# Patient Record
Sex: FEMALE | Race: WHITE | NOT HISPANIC OR LATINO | Employment: FULL TIME | URBAN - NONMETROPOLITAN AREA
[De-identification: names, ages, dates, MRNs, and addresses within clinical notes are randomized per-mention and may not be internally consistent; named-entity substitution may affect disease eponyms.]

---

## 2019-03-19 ENCOUNTER — HOSPITAL ENCOUNTER (EMERGENCY)
Facility: HOSPITAL | Age: 44
Discharge: HOME | End: 2019-03-20
Attending: EMERGENCY MEDICINE | Admitting: EMERGENCY MEDICINE

## 2019-03-19 VITALS
OXYGEN SATURATION: 100 % | RESPIRATION RATE: 16 BRPM | WEIGHT: 145 LBS | HEIGHT: 66 IN | HEART RATE: 81 BPM | DIASTOLIC BLOOD PRESSURE: 88 MMHG | TEMPERATURE: 97.6 F | SYSTOLIC BLOOD PRESSURE: 132 MMHG | BODY MASS INDEX: 23.3 KG/M2

## 2019-03-19 DIAGNOSIS — R10.9 ABDOMINAL PAIN, UNSPECIFIED ABDOMINAL LOCATION: Primary | ICD-10-CM

## 2019-03-19 LAB
6-ACETYL MORPHINE: NEGATIVE
ALBUMIN SERPL-MCNC: 4.64 G/DL (ref 3.5–5.2)
ALBUMIN/GLOB SERPL: 1.1 G/DL
ALP SERPL-CCNC: 49 U/L (ref 39–117)
ALT SERPL W P-5'-P-CCNC: 8 U/L (ref 1–33)
AMPHET+METHAMPHET UR QL: POSITIVE
ANION GAP SERPL CALCULATED.3IONS-SCNC: 10.1 MMOL/L
AST SERPL-CCNC: 24 U/L (ref 1–32)
BACTERIA UR QL AUTO: ABNORMAL /HPF
BARBITURATES UR QL SCN: NEGATIVE
BASOPHILS # BLD AUTO: 0.02 10*3/MM3 (ref 0–0.2)
BASOPHILS NFR BLD AUTO: 0.4 % (ref 0–1.5)
BENZODIAZ UR QL SCN: NEGATIVE
BILIRUB SERPL-MCNC: 0.5 MG/DL (ref 0.2–1.2)
BILIRUB UR QL STRIP: NEGATIVE
BUN BLD-MCNC: 12 MG/DL (ref 6–20)
BUN/CREAT SERPL: 22.6 (ref 7–25)
BUPRENORPHINE SERPL-MCNC: POSITIVE NG/ML
CALCIUM SPEC-SCNC: 9.5 MG/DL (ref 8.6–10.5)
CANNABINOIDS SERPL QL: NEGATIVE
CHLORIDE SERPL-SCNC: 100 MMOL/L (ref 98–107)
CLARITY UR: ABNORMAL
CO2 SERPL-SCNC: 25.9 MMOL/L (ref 22–29)
COCAINE UR QL: NEGATIVE
COLOR UR: YELLOW
CREAT BLD-MCNC: 0.53 MG/DL (ref 0.57–1)
DEPRECATED RDW RBC AUTO: 45.9 FL (ref 37–54)
EOSINOPHIL # BLD AUTO: 0.29 10*3/MM3 (ref 0–0.4)
EOSINOPHIL NFR BLD AUTO: 6 % (ref 0.3–6.2)
ERYTHROCYTE [DISTWIDTH] IN BLOOD BY AUTOMATED COUNT: 14.3 % (ref 12.3–15.4)
GFR SERPL CREATININE-BSD FRML MDRD: 126 ML/MIN/1.73
GLOBULIN UR ELPH-MCNC: 4.1 GM/DL
GLUCOSE BLD-MCNC: 84 MG/DL (ref 65–99)
GLUCOSE UR STRIP-MCNC: NEGATIVE MG/DL
HCG SERPL QL: NEGATIVE
HCT VFR BLD AUTO: 41.6 % (ref 34–46.6)
HGB BLD-MCNC: 13.6 G/DL (ref 12–15.9)
HGB UR QL STRIP.AUTO: ABNORMAL
HOLD SPECIMEN: NORMAL
HOLD SPECIMEN: NORMAL
HYALINE CASTS UR QL AUTO: ABNORMAL /LPF
IMM GRANULOCYTES # BLD AUTO: 0 10*3/MM3 (ref 0–0.05)
IMM GRANULOCYTES NFR BLD AUTO: 0 % (ref 0–0.5)
KETONES UR QL STRIP: NEGATIVE
LEUKOCYTE ESTERASE UR QL STRIP.AUTO: NEGATIVE
LIPASE SERPL-CCNC: 44 U/L (ref 13–60)
LYMPHOCYTES # BLD AUTO: 1.66 10*3/MM3 (ref 0.7–3.1)
LYMPHOCYTES NFR BLD AUTO: 34.4 % (ref 19.6–45.3)
MCH RBC QN AUTO: 29.1 PG (ref 26.6–33)
MCHC RBC AUTO-ENTMCNC: 32.7 G/DL (ref 31.5–35.7)
MCV RBC AUTO: 89.1 FL (ref 79–97)
METHADONE UR QL SCN: NEGATIVE
MONOCYTES # BLD AUTO: 0.29 10*3/MM3 (ref 0.1–0.9)
MONOCYTES NFR BLD AUTO: 6 % (ref 5–12)
MUCOUS THREADS URNS QL MICRO: ABNORMAL /HPF
NEUTROPHILS # BLD AUTO: 2.57 10*3/MM3 (ref 1.4–7)
NEUTROPHILS NFR BLD AUTO: 53.2 % (ref 42.7–76)
NITRITE UR QL STRIP: POSITIVE
OPIATES UR QL: NEGATIVE
OXYCODONE UR QL SCN: NEGATIVE
PCP UR QL SCN: NEGATIVE
PH UR STRIP.AUTO: 5.5 [PH] (ref 5–8)
PLATELET # BLD AUTO: 201 10*3/MM3 (ref 140–450)
PMV BLD AUTO: 9.9 FL (ref 6–12)
POTASSIUM BLD-SCNC: 3.8 MMOL/L (ref 3.5–5.2)
PROT SERPL-MCNC: 8.7 G/DL (ref 6–8.5)
PROT UR QL STRIP: NEGATIVE
RBC # BLD AUTO: 4.67 10*6/MM3 (ref 3.77–5.28)
RBC # UR: ABNORMAL /HPF
REF LAB TEST METHOD: ABNORMAL
SODIUM BLD-SCNC: 136 MMOL/L (ref 136–145)
SP GR UR STRIP: 1.02 (ref 1–1.03)
SQUAMOUS #/AREA URNS HPF: ABNORMAL /HPF
UROBILINOGEN UR QL STRIP: ABNORMAL
WBC NRBC COR # BLD: 4.83 10*3/MM3 (ref 3.4–10.8)
WBC UR QL AUTO: ABNORMAL /HPF
WHOLE BLOOD HOLD SPECIMEN: NORMAL
WHOLE BLOOD HOLD SPECIMEN: NORMAL

## 2019-03-19 PROCEDURE — 85025 COMPLETE CBC W/AUTO DIFF WBC: CPT | Performed by: FAMILY MEDICINE

## 2019-03-19 PROCEDURE — 80307 DRUG TEST PRSMV CHEM ANLYZR: CPT | Performed by: EMERGENCY MEDICINE

## 2019-03-19 PROCEDURE — 80053 COMPREHEN METABOLIC PANEL: CPT | Performed by: FAMILY MEDICINE

## 2019-03-19 PROCEDURE — 84703 CHORIONIC GONADOTROPIN ASSAY: CPT | Performed by: FAMILY MEDICINE

## 2019-03-19 PROCEDURE — 99282 EMERGENCY DEPT VISIT SF MDM: CPT

## 2019-03-19 PROCEDURE — 81001 URINALYSIS AUTO W/SCOPE: CPT | Performed by: EMERGENCY MEDICINE

## 2019-03-19 PROCEDURE — 36415 COLL VENOUS BLD VENIPUNCTURE: CPT

## 2019-03-19 PROCEDURE — 83690 ASSAY OF LIPASE: CPT | Performed by: FAMILY MEDICINE

## 2019-03-19 RX ORDER — SODIUM CHLORIDE 0.9 % (FLUSH) 0.9 %
10 SYRINGE (ML) INJECTION AS NEEDED
Status: DISCONTINUED | OUTPATIENT
Start: 2019-03-19 | End: 2019-03-20 | Stop reason: HOSPADM

## 2019-03-20 ENCOUNTER — APPOINTMENT (OUTPATIENT)
Dept: CT IMAGING | Facility: HOSPITAL | Age: 44
End: 2019-03-20

## 2019-03-20 NOTE — ED NOTES
PT seem leaving the ED at this time, pt did not receive discharge instructions from provider, pt had agreed to stay for treatment in ER about 15 minutes earlier. Pt did not attempt to ask any further questions before leaving. Pt remained on ED stretcher with NADN, pt vitals signs remained stable. Pt did not receive any medication or an IV while in ED since she refused.        Heidy Barba, RN  03/20/19 0664

## 2019-03-20 NOTE — ED NOTES
I faxed a signed release of medical records to [facility].     Cm Marcial  03/19/19 4099

## 2019-03-20 NOTE — ED NOTES
"Pt refusing an IV right now unless \"there is medicine for it.\"     Heidy Barba, RN  03/19/19 7755    "

## 2019-03-20 NOTE — ED NOTES
"Pt in room and verbalizes frustration, \"i'm just tired and want to go home\". Pt advised to stay for the scan that has been ordered and the pt wants the provider to come to the room. Provider aware.     Heidy Barba, RN  03/20/19 0002    "

## 2019-03-20 NOTE — ED NOTES
Pt seen leaving ED with no verbal discharge instructions from provider. Provider made aware.      Heidy Barba, RN  03/20/19 0000

## 2019-03-20 NOTE — ED PROVIDER NOTES
Subjective   43-year-old female presents to the emergency room today complaining of generalized abdominal pain and generalized fatigue.  Patient stated that last week she was seen at [facility] emergency room and told that she had a mass on 1 of her kidneys.  Stated that she was supposed to have further imaging but does not have a family doctor to get that done.  Patient states that she just needs to find out what is going on with her.  She denies any fever, chest pain, shortness of breath, cough, congestion, wheezing, nausea vomiting or diarrhea.        History provided by:  Patient   used: No        Review of Systems   Constitutional: Positive for fatigue.   HENT: Negative.    Eyes: Negative.    Respiratory: Negative.    Cardiovascular: Negative.    Gastrointestinal: Positive for abdominal distention.   Endocrine: Negative.    Genitourinary: Negative.    Musculoskeletal: Negative.    Skin: Negative.    Allergic/Immunologic: Negative.    Neurological: Negative.    Hematological: Negative.    Psychiatric/Behavioral: Negative.    All other systems reviewed and are negative.      No past medical history on file.    Allergies   Allergen Reactions   • Contrast Dye Anaphylaxis       No past surgical history on file.    No family history on file.    Social History     Socioeconomic History   • Marital status:      Spouse name: Not on file   • Number of children: Not on file   • Years of education: Not on file   • Highest education level: Not on file           Objective   Physical Exam   Constitutional: She is oriented to person, place, and time. She appears well-developed and well-nourished.   HENT:   Head: Normocephalic and atraumatic.   Mouth/Throat: Oropharynx is clear and moist.   Eyes: EOM are normal. Pupils are equal, round, and reactive to light.   Cardiovascular: Normal rate, regular rhythm, normal heart sounds and intact distal pulses.   Pulmonary/Chest: Effort normal and breath  sounds normal.   Abdominal: Soft. Normal appearance and bowel sounds are normal. There is generalized tenderness.   Neurological: She is alert and oriented to person, place, and time.   Skin: Skin is warm and dry.   Psychiatric: She has a normal mood and affect. Her behavior is normal.   Nursing note and vitals reviewed.      Procedures           ED Course  ED Course as of Mar 20 0707   Tue Mar 19, 2019   2345 She will voiced to the nurse that she just wanted to leave and not have her scans.  I went spoke with the patient and educated her on what I had found based on her medical records from [facility].  Instructed her that we needed to get a CT scan of her abdomen and pelvis without contrast to evaluate this supposed mass.  She voiced understanding and was agreeable to the treatment plan.  [ML]   Wed Mar 20, 2019   0008 I reviewed medical records from [facility].  Patient was seen there on March 4, 2019.  Patient was seen for shortness of breath and O2 saturations dropping with ambulation.  Patient received a CT chest PE protocol impression was read as no PE.  Partially visualized enhancing nodule at the margin of the left kidney is nonspecific but somewhat concerning for renal cell carcinoma further evaluation with dedicated renal mass protocol CT or MRI is suggested.  Otherwise patient had a negative workup.  Patient tells me that she is highly allergic to contrast dye.  CT stone protocol was ordered.  [ML]   0706 Patient eloped from her room.  She was seen leaving the ER.  Patient never returned to her room.  [ML]      ED Course User Index  [ML] Linda Swain PA                  [facility]      Final diagnoses:   Abdominal pain, unspecified abdominal location            Linda Swain PA  03/20/19 0707

## 2019-03-20 NOTE — ED NOTES
Provider and myself to bedside to explain the benefits of staying for treatment, and pt agrees to stay at this time for the scan. Will continue to monitor and follow plan of care.      Heidy Barba, RN  03/20/19 0003

## 2019-04-09 ENCOUNTER — OFFICE VISIT (OUTPATIENT)
Dept: FAMILY MEDICINE CLINIC | Facility: CLINIC | Age: 44
End: 2019-04-09

## 2019-04-09 VITALS
HEIGHT: 66 IN | TEMPERATURE: 97.5 F | SYSTOLIC BLOOD PRESSURE: 120 MMHG | BODY MASS INDEX: 23.14 KG/M2 | HEART RATE: 71 BPM | WEIGHT: 144 LBS | OXYGEN SATURATION: 96 % | DIASTOLIC BLOOD PRESSURE: 76 MMHG

## 2019-04-09 DIAGNOSIS — R53.83 OTHER FATIGUE: ICD-10-CM

## 2019-04-09 DIAGNOSIS — F19.11 HX OF DRUG ABUSE (HCC): ICD-10-CM

## 2019-04-09 DIAGNOSIS — F41.9 ANXIETY AND DEPRESSION: ICD-10-CM

## 2019-04-09 DIAGNOSIS — N39.0 ACUTE UTI: ICD-10-CM

## 2019-04-09 DIAGNOSIS — M79.2 LEG NEURALGIA, RIGHT: ICD-10-CM

## 2019-04-09 DIAGNOSIS — B18.2 CHRONIC HEPATITIS C WITHOUT HEPATIC COMA (HCC): Primary | ICD-10-CM

## 2019-04-09 DIAGNOSIS — N28.89 RENAL MASS, LEFT: ICD-10-CM

## 2019-04-09 DIAGNOSIS — R55 SYNCOPE, UNSPECIFIED SYNCOPE TYPE: ICD-10-CM

## 2019-04-09 DIAGNOSIS — F32.A ANXIETY AND DEPRESSION: ICD-10-CM

## 2019-04-09 DIAGNOSIS — K21.9 GASTROESOPHAGEAL REFLUX DISEASE, ESOPHAGITIS PRESENCE NOT SPECIFIED: ICD-10-CM

## 2019-04-09 DIAGNOSIS — I34.1 MVP (MITRAL VALVE PROLAPSE): ICD-10-CM

## 2019-04-09 LAB
BILIRUB BLD-MCNC: NEGATIVE MG/DL
CLARITY, POC: CLEAR
COLOR UR: ABNORMAL
GLUCOSE UR STRIP-MCNC: NEGATIVE MG/DL
KETONES UR QL: NEGATIVE
LEUKOCYTE EST, POC: NEGATIVE
NITRITE UR-MCNC: NEGATIVE MG/ML
PH UR: 6 [PH] (ref 5–8)
PROT UR STRIP-MCNC: ABNORMAL MG/DL
RBC # UR STRIP: NEGATIVE /UL
SP GR UR: 1.03 (ref 1–1.03)
UROBILINOGEN UR QL: NORMAL

## 2019-04-09 PROCEDURE — 82607 VITAMIN B-12: CPT | Performed by: NURSE PRACTITIONER

## 2019-04-09 PROCEDURE — 99204 OFFICE O/P NEW MOD 45 MIN: CPT | Performed by: NURSE PRACTITIONER

## 2019-04-09 PROCEDURE — 84439 ASSAY OF FREE THYROXINE: CPT | Performed by: NURSE PRACTITIONER

## 2019-04-09 PROCEDURE — 85025 COMPLETE CBC W/AUTO DIFF WBC: CPT | Performed by: NURSE PRACTITIONER

## 2019-04-09 PROCEDURE — 84443 ASSAY THYROID STIM HORMONE: CPT | Performed by: NURSE PRACTITIONER

## 2019-04-09 PROCEDURE — 80074 ACUTE HEPATITIS PANEL: CPT | Performed by: NURSE PRACTITIONER

## 2019-04-09 PROCEDURE — 80053 COMPREHEN METABOLIC PANEL: CPT | Performed by: NURSE PRACTITIONER

## 2019-04-09 PROCEDURE — 83735 ASSAY OF MAGNESIUM: CPT | Performed by: NURSE PRACTITIONER

## 2019-04-09 RX ORDER — OMEPRAZOLE 40 MG/1
40 CAPSULE, DELAYED RELEASE ORAL DAILY
Qty: 30 CAPSULE | Refills: 2 | Status: SHIPPED | OUTPATIENT
Start: 2019-04-09 | End: 2020-03-19 | Stop reason: SDUPTHER

## 2019-04-09 RX ORDER — DIVALPROEX SODIUM 500 MG/1
TABLET, DELAYED RELEASE ORAL
Refills: 1 | COMMUNITY
Start: 2019-03-12 | End: 2019-12-12

## 2019-04-09 RX ORDER — PHENTERMINE HYDROCHLORIDE 37.5 MG/1
37.5 TABLET ORAL
COMMUNITY
End: 2019-06-18

## 2019-04-09 RX ORDER — BUPRENORPHINE HYDROCHLORIDE AND NALOXONE HYDROCHLORIDE DIHYDRATE 8; 2 MG/1; MG/1
TABLET SUBLINGUAL
Refills: 0 | COMMUNITY
Start: 2019-03-12 | End: 2019-12-12

## 2019-04-09 NOTE — PROGRESS NOTES
Subjective   Meghan Rivas is a 43 y.o. female.     Chief Complaint: Establish Care    Anxiety   Presents for initial visit. Onset was more than 5 years ago. The problem has been gradually worsening. Symptoms include decreased concentration, depressed mood, dizziness, insomnia, irritability, malaise, nervous/anxious behavior, panic and restlessness. Patient reports no suicidal ideas. 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 Symptoms occur constantly. The severity of symptoms is causing significant distress. The quality of sleep is fair. Nighttime awakenings: one to two.     Risk factors: hx of drug abuse. Her past medical history is significant for anxiety/panic attacks and depression. Past treatments include SSRIs. Compliance with prior treatments has been variable.   Depression   Visit Type: initial  Onset of symptoms: more than 5 years ago  Progression since onset: gradually worsening  Patient presents with the following symptoms: decreased concentration, depressed mood, dizziness, fatigue, insomnia, irritability, malaise, nervousness/anxiety, panic, restlessness and weight loss.  Patient is not experiencing: suicidal ideas and suicidal planning.  Frequency of symptoms: constantly   Severity: causing significant distress   Risk factors: illicit drug use  Patient has a history of: anxiety/panic attacks, depression and mental illness  Treatment tried: non-SSRI antidepressants and SSRI  Compliance with treatment: variable  Improvement on treatment: mild      Lower Extremity Issue   This is a chronic problem. The current episode started more than 1 year ago. The problem occurs daily. The problem has been unchanged. Associated symptoms include abdominal pain, coughing, fatigue and myalgias. Associated symptoms comments: Patient has several areas behind her right knee area that are scarred from IV drug use;  N/T in right leg and foot . The symptoms are  aggravated by walking. She has tried nothing for the symptoms.   Heartburn   She complains of abdominal pain, coughing and heartburn. 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 This is a new problem. The current episode started more than 1 month ago. The problem occurs frequently. The problem has been unchanged. The heartburn does not wake her from sleep. The heartburn does not limit her activity. The heartburn doesn't change with position. Nothing aggravates the symptoms. Associated symptoms include fatigue and weight loss. Risk factors include smoking/tobacco exposure. She has tried nothing for the symptoms.   Flank Pain   This is a recurrent problem. The current episode started more than 1 month ago. The problem occurs daily. The problem is unchanged. The quality of the pain is described as aching and cramping. The pain does not radiate. The pain is at a severity of 6/10. The pain is moderate. The pain is the same all the time. Associated symptoms include abdominal pain and weight loss. (Pt had CT done which showed renal nodule) She has tried nothing for the symptoms.   Dizziness   This is a new problem. The current episode started more than 1 month ago. Episode frequency: two episodes of syncope. The problem has been resolved. Associated symptoms include abdominal pain, coughing, fatigue and myalgias. Nothing aggravates the symptoms. She has tried nothing for the symptoms.   Fatigue   This is a chronic problem. The current episode started more than 1 month ago. The problem occurs daily. The problem has been unchanged. Associated symptoms include abdominal pain, coughing, fatigue and myalgias. Nothing aggravates the symptoms. She has tried nothing for the symptoms.   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 This is a chronic problem. The current episode started more than 1 year ago. The problem has been  "resolved since onset. Suspected agents include methamphetamines. Past treatments include methadone and outpatient rehab. The treatment provided significant relief. Her past medical history is significant for addiction treatment and a mental illness.   Urinary Tract Infection    This is a new (pt went to [facility] ER and was dx and treated for UTI; symptoms have resolved) problem. The problem has been resolved. The patient is experiencing no pain. There has been no fever. Associated symptoms include flank pain. She has tried antibiotics for the symptoms. The treatment provided significant relief.      Pt here to establish care. Pt has not been following with a PCP in over 12 years.  She has been following with suboxone clinic and [facility] in the past for treatment of her drug addiction.  She has been taking suboxone and depakote for her symptoms and treatment.  She states that the depakote helped for a few months but has stopped working.      Pt went to [facility] ER for evaluation of bilateral flank pain along with vomiting and shortness of breath for 3 weeks on 3/20/2019.  She was diagnosed with UTI.  She also had a chest xray with normal findings.  I have reviewed her ER notes and it does state that patient had CT chest on 3/3/2109 which showed left renal nodule that is nonspecific but \"concern for renal cell carcinoma.\"  She has not had follow up for these findings.   I have reviewed the findings of the CT and it does state that further evaluation with dedicated renal mass protocol CT or MRI is suggested.     Parker # [id number]  Reviewed and is consistent.  UDS done today.        Family History   Adopted: Yes       Social History     Socioeconomic History   • Marital status:      Spouse name: Not on file   • Number of children: Not on file   • Years of education: Not on file   • Highest education level: Not on file   Tobacco Use   • Smoking status: Current Every Day Smoker     Packs/day: 0.50     Types: " Cigarettes   • Smokeless tobacco: Never Used   Substance and Sexual Activity   • Alcohol use: No     Frequency: Never   • Drug use: Yes     Types: IV, Methamphetamines     Comment: quit 19 months ago   • Sexual activity: Defer       Past Medical History:   Diagnosis Date   • Anxiety    • Depression    • Hepatitis C    • History of drug abuse    • Seizures (CMS/HCC)        Review of Systems   Constitutional: Positive for fatigue, irritability and weight loss.   HENT: Negative.    Eyes: Negative.    Respiratory: Positive for cough.    Cardiovascular: Negative.         Hx of MVP   Gastrointestinal: Positive for abdominal pain and heartburn.   Endocrine: Negative.    Genitourinary: Positive for flank pain.   Musculoskeletal: Positive for myalgias.   Skin: Negative.    Allergic/Immunologic: Negative.    Neurological: Positive for dizziness.   Hematological: Negative.    Psychiatric/Behavioral: Positive for decreased concentration. Negative for suicidal ideas. The patient is nervous/anxious and has insomnia.        Objective   Physical Exam   Constitutional: She is oriented to person, place, and time. She appears well-developed and well-nourished.   HENT:   Head: Normocephalic and atraumatic.   Eyes: Conjunctivae and EOM are normal. Pupils are equal, round, and reactive to light.   Neck: Normal range of motion. Neck supple. No thyromegaly present.   Cardiovascular: Normal rate, regular rhythm and normal heart sounds.   Pulmonary/Chest: Effort normal. She has wheezes.   Abdominal: Soft. Bowel sounds are normal. She exhibits no distension and no mass. There is tenderness (epigastric).   Musculoskeletal: Normal range of motion. She exhibits tenderness (posterior right knee area). She exhibits no edema or deformity.   Lymphadenopathy:     She has no cervical adenopathy.   Neurological: She is alert and oriented to person, place, and time.   Skin: Skin is warm and dry.   Psychiatric: She has a normal mood and affect. Her  "behavior is normal. Judgment and thought content normal.   Nursing note and vitals reviewed.      Procedures    Vitals: Blood pressure 120/76, pulse 71, temperature 97.5 °F (36.4 °C), temperature source Oral, height 167.6 cm (66\"), weight 65.3 kg (144 lb), SpO2 96 %, not currently breastfeeding.    Allergies:   Allergies   Allergen Reactions   • Contrast Dye Anaphylaxis        During this visit the following were done:  Labs Reviewed []    Labs Ordered []    Radiology Reports Reviewed []    Radiology Ordered []    PCP Records Reviewed []    Referring Provider Records Reviewed []    ER Records Reviewed []    Hospital Records Reviewed []    History Obtained From Family []    Radiology Images Reviewed []    Other Reviewed []    Records Requested []      Assessment/Plan   Meghan was seen today for establish care.    Diagnoses and all orders for this visit:    Chronic hepatitis C without hepatic coma (CMS/HCC)  -     CBC & Differential  -     Comprehensive Metabolic Panel  -     Magnesium  -     TSH  -     T4, Free  -     Vitamin B12  -     Hepatitis Panel, Acute    MVP (mitral valve prolapse)  -     CBC & Differential  -     Comprehensive Metabolic Panel  -     Magnesium  -     TSH  -     T4, Free  -     Vitamin B12    Hx of drug abuse  -     CBC & Differential  -     Comprehensive Metabolic Panel  -     Magnesium  -     TSH  -     T4, Free  -     Vitamin B12    Leg neuralgia, right  -     CBC & Differential  -     Comprehensive Metabolic Panel  -     Magnesium  -     TSH  -     T4, Free  -     Vitamin B12    Renal mass, left  -     CBC & Differential  -     Comprehensive Metabolic Panel  -     Magnesium  -     TSH  -     T4, Free  -     Vitamin B12  -     Ambulatory Referral to Urology  -     CT angiogram renal; Future    Anxiety and depression  -     CBC & Differential  -     Comprehensive Metabolic Panel  -     Magnesium  -     TSH  -     T4, Free  -     Vitamin B12  -     Ambulatory Referral to Psychiatry    Other " fatigue  -     CBC & Differential  -     Comprehensive Metabolic Panel  -     Magnesium  -     TSH  -     T4, Free  -     Vitamin B12    Gastroesophageal reflux disease, esophagitis presence not specified  -     omeprazole (PRILOSEC) 40 MG capsule; Take 1 capsule by mouth Daily.    Syncope, unspecified syncope type  -     CT Head Without Contrast; Future    Acute UTI      Labs today  Urine appears stable  Start on omeprazole; re-evaluate in 2-3 weeks

## 2019-04-10 ENCOUNTER — TELEPHONE (OUTPATIENT)
Dept: FAMILY MEDICINE CLINIC | Facility: CLINIC | Age: 44
End: 2019-04-10

## 2019-04-10 DIAGNOSIS — B18.2 HEP C W/O COMA, CHRONIC (HCC): Primary | ICD-10-CM

## 2019-04-10 LAB
ALBUMIN SERPL-MCNC: 4.3 G/DL (ref 3.5–5.2)
ALBUMIN/GLOB SERPL: 1.1 G/DL
ALP SERPL-CCNC: 38 U/L (ref 39–117)
ALT SERPL W P-5'-P-CCNC: 6 U/L (ref 1–33)
ANION GAP SERPL CALCULATED.3IONS-SCNC: 12.3 MMOL/L
AST SERPL-CCNC: 22 U/L (ref 1–32)
BASOPHILS # BLD AUTO: 0.05 10*3/MM3 (ref 0–0.2)
BASOPHILS NFR BLD AUTO: 0.9 % (ref 0–1.5)
BILIRUB SERPL-MCNC: 0.5 MG/DL (ref 0.2–1.2)
BUN BLD-MCNC: 16 MG/DL (ref 6–20)
BUN/CREAT SERPL: 25.4 (ref 7–25)
CALCIUM SPEC-SCNC: 9.8 MG/DL (ref 8.6–10.5)
CHLORIDE SERPL-SCNC: 101 MMOL/L (ref 98–107)
CO2 SERPL-SCNC: 24.7 MMOL/L (ref 22–29)
CREAT BLD-MCNC: 0.63 MG/DL (ref 0.57–1)
DEPRECATED RDW RBC AUTO: 45.1 FL (ref 37–54)
EOSINOPHIL # BLD AUTO: 0.36 10*3/MM3 (ref 0–0.4)
EOSINOPHIL NFR BLD AUTO: 6.8 % (ref 0.3–6.2)
ERYTHROCYTE [DISTWIDTH] IN BLOOD BY AUTOMATED COUNT: 13.5 % (ref 12.3–15.4)
GFR SERPL CREATININE-BSD FRML MDRD: 103 ML/MIN/1.73
GLOBULIN UR ELPH-MCNC: 3.9 GM/DL
GLUCOSE BLD-MCNC: 85 MG/DL (ref 65–99)
HAV IGM SERPL QL IA: ABNORMAL
HBV CORE IGM SERPL QL IA: ABNORMAL
HBV SURFACE AG SERPL QL IA: ABNORMAL
HCT VFR BLD AUTO: 41 % (ref 34–46.6)
HCV AB SER DONR QL: REACTIVE
HGB BLD-MCNC: 13.3 G/DL (ref 12–15.9)
IMM GRANULOCYTES # BLD AUTO: 0.01 10*3/MM3 (ref 0–0.05)
IMM GRANULOCYTES NFR BLD AUTO: 0.2 % (ref 0–0.5)
LYMPHOCYTES # BLD AUTO: 2.6 10*3/MM3 (ref 0.7–3.1)
LYMPHOCYTES NFR BLD AUTO: 48.8 % (ref 19.6–45.3)
MAGNESIUM SERPL-MCNC: 2.2 MG/DL (ref 1.6–2.6)
MCH RBC QN AUTO: 29.5 PG (ref 26.6–33)
MCHC RBC AUTO-ENTMCNC: 32.4 G/DL (ref 31.5–35.7)
MCV RBC AUTO: 90.9 FL (ref 79–97)
MONOCYTES # BLD AUTO: 0.37 10*3/MM3 (ref 0.1–0.9)
MONOCYTES NFR BLD AUTO: 6.9 % (ref 5–12)
NEUTROPHILS # BLD AUTO: 1.94 10*3/MM3 (ref 1.4–7)
NEUTROPHILS NFR BLD AUTO: 36.4 % (ref 42.7–76)
NRBC BLD AUTO-RTO: 0 /100 WBC (ref 0–0)
PLATELET # BLD AUTO: 228 10*3/MM3 (ref 140–450)
PMV BLD AUTO: 11.2 FL (ref 6–12)
POTASSIUM BLD-SCNC: 4.1 MMOL/L (ref 3.5–5.2)
PROT SERPL-MCNC: 8.2 G/DL (ref 6–8.5)
RBC # BLD AUTO: 4.51 10*6/MM3 (ref 3.77–5.28)
SODIUM BLD-SCNC: 138 MMOL/L (ref 136–145)
T4 FREE SERPL-MCNC: 1.27 NG/DL (ref 0.93–1.7)
TSH SERPL DL<=0.05 MIU/L-ACNC: 2.49 MIU/ML (ref 0.27–4.2)
VIT B12 BLD-MCNC: 1080 PG/ML (ref 211–946)
WBC NRBC COR # BLD: 5.33 10*3/MM3 (ref 3.4–10.8)

## 2019-04-10 NOTE — TELEPHONE ENCOUNTER
----- Message from REBECCA Nunez sent at 4/10/2019  8:47 AM EDT -----  Her labs appear to be stable at this time.  Her thyroid is normal.    She is positive for Hep C.  She was aware of that but just let her know that it did come back positive results. As of right now her liver enzymes are normal.  Would she like to see someone about treatment for the Hep C.

## 2019-04-10 NOTE — TELEPHONE ENCOUNTER
----- Message from REBECCA Nunez sent at 4/10/2019  8:47 AM EDT -----  Her labs appear to be stable at this time.  Her thyroid is normal.    She is positive for Hep C.  She was aware of that but just let her know that it did come back positive results. As of right now her liver enzymes are normal.  Would she like to see someone about treatment for the Hep C.     Patient returned call & verbalized understanding,would like a referral for treatment of Hep. C,no preference.

## 2019-04-10 NOTE — PROGRESS NOTES
Her labs appear to be stable at this time.  Her thyroid is normal.    She is positive for Hep C.  She was aware of that but just let her know that it did come back positive results. As of right now her liver enzymes are normal.  Would she like to see someone about treatment for the Hep C.

## 2019-04-16 ENCOUNTER — TELEPHONE (OUTPATIENT)
Dept: FAMILY MEDICINE CLINIC | Facility: CLINIC | Age: 44
End: 2019-04-16

## 2019-04-16 DIAGNOSIS — N28.89 RENAL MASS, LEFT: Primary | ICD-10-CM

## 2019-04-23 ENCOUNTER — HOSPITAL ENCOUNTER (OUTPATIENT)
Dept: CT IMAGING | Facility: HOSPITAL | Age: 44
Discharge: HOME | End: 2019-04-23

## 2019-04-23 ENCOUNTER — HOSPITAL ENCOUNTER (OUTPATIENT)
Dept: CT IMAGING | Facility: HOSPITAL | Age: 44
Discharge: HOME | End: 2019-04-23
Admitting: NURSE PRACTITIONER

## 2019-04-23 DIAGNOSIS — R55 SYNCOPE, UNSPECIFIED SYNCOPE TYPE: ICD-10-CM

## 2019-04-23 DIAGNOSIS — N28.89 RENAL MASS, LEFT: ICD-10-CM

## 2019-04-23 PROCEDURE — 74176 CT ABD & PELVIS W/O CONTRAST: CPT

## 2019-04-23 PROCEDURE — 74176 CT ABD & PELVIS W/O CONTRAST: CPT | Performed by: RADIOLOGY

## 2019-04-23 PROCEDURE — 70450 CT HEAD/BRAIN W/O DYE: CPT | Performed by: RADIOLOGY

## 2019-04-23 PROCEDURE — 70450 CT HEAD/BRAIN W/O DYE: CPT

## 2019-04-24 ENCOUNTER — TELEPHONE (OUTPATIENT)
Dept: FAMILY MEDICINE CLINIC | Facility: CLINIC | Age: 44
End: 2019-04-24

## 2019-04-24 NOTE — TELEPHONE ENCOUNTER
----- Message from REBECCA Nunez sent at 4/24/2019  8:35 AM EDT -----  CT of head is normal.  Please let her know.       Left a message to return call.

## 2019-04-24 NOTE — TELEPHONE ENCOUNTER
----- Message from REBECCA Nunez sent at 4/24/2019  8:35 AM EDT -----  No renal mass was found on her CT of abdomen.  Please let her know.       Left a message to return call.      Left a second message to return call.    Still unable to reach patient,normal letter mailed.

## 2019-06-18 ENCOUNTER — OFFICE VISIT (OUTPATIENT)
Dept: FAMILY MEDICINE CLINIC | Facility: CLINIC | Age: 44
End: 2019-06-18

## 2019-06-18 VITALS
DIASTOLIC BLOOD PRESSURE: 82 MMHG | BODY MASS INDEX: 24.43 KG/M2 | OXYGEN SATURATION: 99 % | HEART RATE: 88 BPM | SYSTOLIC BLOOD PRESSURE: 120 MMHG | TEMPERATURE: 99.3 F | HEIGHT: 66 IN | WEIGHT: 152 LBS

## 2019-06-18 DIAGNOSIS — F32.1 CURRENT MODERATE EPISODE OF MAJOR DEPRESSIVE DISORDER WITHOUT PRIOR EPISODE (HCC): ICD-10-CM

## 2019-06-18 DIAGNOSIS — J84.9 INTERSTITIAL PNEUMONIA (HCC): Primary | ICD-10-CM

## 2019-06-18 DIAGNOSIS — B18.2 HEP C W/O COMA, CHRONIC (HCC): ICD-10-CM

## 2019-06-18 DIAGNOSIS — K62.5 RECTAL BLEEDING: ICD-10-CM

## 2019-06-18 DIAGNOSIS — I34.1 MVP (MITRAL VALVE PROLAPSE): ICD-10-CM

## 2019-06-18 LAB
CHROMATIN AB SERPL-ACNC: 14.3 IU/ML (ref 0–14)
CRP SERPL-MCNC: 0.34 MG/DL (ref 0–0.5)
HIV1+2 AB SER QL: NORMAL
URATE SERPL-MCNC: 4 MG/DL (ref 2.4–5.7)

## 2019-06-18 PROCEDURE — 86235 NUCLEAR ANTIGEN ANTIBODY: CPT | Performed by: FAMILY MEDICINE

## 2019-06-18 PROCEDURE — 99214 OFFICE O/P EST MOD 30 MIN: CPT | Performed by: FAMILY MEDICINE

## 2019-06-18 PROCEDURE — 86431 RHEUMATOID FACTOR QUANT: CPT | Performed by: FAMILY MEDICINE

## 2019-06-18 PROCEDURE — G0432 EIA HIV-1/HIV-2 SCREEN: HCPCS | Performed by: FAMILY MEDICINE

## 2019-06-18 PROCEDURE — 86038 ANTINUCLEAR ANTIBODIES: CPT | Performed by: FAMILY MEDICINE

## 2019-06-18 PROCEDURE — 84550 ASSAY OF BLOOD/URIC ACID: CPT | Performed by: FAMILY MEDICINE

## 2019-06-18 PROCEDURE — 86225 DNA ANTIBODY NATIVE: CPT | Performed by: FAMILY MEDICINE

## 2019-06-18 PROCEDURE — 86480 TB TEST CELL IMMUN MEASURE: CPT | Performed by: FAMILY MEDICINE

## 2019-06-18 PROCEDURE — 86140 C-REACTIVE PROTEIN: CPT | Performed by: FAMILY MEDICINE

## 2019-06-18 PROCEDURE — 85652 RBC SED RATE AUTOMATED: CPT | Performed by: FAMILY MEDICINE

## 2019-06-18 PROCEDURE — 86200 CCP ANTIBODY: CPT | Performed by: FAMILY MEDICINE

## 2019-06-18 RX ORDER — PREDNISONE 20 MG/1
TABLET ORAL
Refills: 0 | COMMUNITY
Start: 2019-06-11 | End: 2019-06-18

## 2019-06-18 RX ORDER — DEXTROMETHORPHAN HYDROBROMIDE AND PROMETHAZINE HYDROCHLORIDE 15; 6.25 MG/5ML; MG/5ML
SYRUP ORAL
COMMUNITY
Start: 2019-05-23 | End: 2019-12-12

## 2019-06-18 RX ORDER — MONTELUKAST SODIUM 10 MG/1
10 TABLET ORAL DAILY
COMMUNITY
Start: 2019-05-23 | End: 2019-12-12

## 2019-06-18 RX ORDER — ARIPIPRAZOLE 5 MG/1
5 TABLET ORAL DAILY
Qty: 30 TABLET | Refills: 2 | Status: SHIPPED | OUTPATIENT
Start: 2019-06-18 | End: 2019-09-11 | Stop reason: SDUPTHER

## 2019-06-18 RX ORDER — PREDNISONE 10 MG/1
TABLET ORAL
Qty: 30 TABLET | Refills: 0 | Status: SHIPPED | OUTPATIENT
Start: 2019-06-18 | End: 2019-12-12

## 2019-06-18 RX ORDER — AZITHROMYCIN 250 MG/1
TABLET, FILM COATED ORAL
Qty: 6 TABLET | Refills: 0 | Status: SHIPPED | OUTPATIENT
Start: 2019-06-18 | End: 2019-12-12 | Stop reason: SDUPTHER

## 2019-06-18 RX ORDER — IPRATROPIUM BROMIDE AND ALBUTEROL SULFATE 2.5; .5 MG/3ML; MG/3ML
3 SOLUTION RESPIRATORY (INHALATION)
Status: DISCONTINUED | OUTPATIENT
Start: 2019-06-18 | End: 2019-12-12

## 2019-06-18 RX ORDER — IPRATROPIUM BROMIDE AND ALBUTEROL SULFATE 2.5; .5 MG/3ML; MG/3ML
3 SOLUTION RESPIRATORY (INHALATION) EVERY 4 HOURS PRN
Qty: 360 ML | Refills: 0 | Status: SHIPPED | OUTPATIENT
Start: 2019-06-18 | End: 2020-03-19 | Stop reason: SDUPTHER

## 2019-06-18 RX ORDER — ALBUTEROL SULFATE 90 UG/1
AEROSOL, METERED RESPIRATORY (INHALATION)
Refills: 0 | COMMUNITY
Start: 2019-06-11 | End: 2019-12-08

## 2019-06-19 LAB
CCP IGA+IGG SERPL IA-ACNC: 5 UNITS (ref 0–19)
ERYTHROCYTE [SEDIMENTATION RATE] IN BLOOD: 15 MM/HR (ref 0–20)

## 2019-06-20 LAB
ANA SER QL IA: POSITIVE
ANA SPECKLED TITR SER: ABNORMAL {TITER}
CENTRIOLE PATTERN: ABNORMAL
CENTROMERE B AB SER-ACNC: <0.2 AI (ref 0–0.9)
CHROMATIN AB SERPL-ACNC: <0.2 AI (ref 0–0.9)
DSDNA AB SER-ACNC: <1 IU/ML (ref 0–9)
ENA JO1 AB SER-ACNC: <0.2 AI (ref 0–0.9)
ENA RNP AB SER-ACNC: <0.2 AI (ref 0–0.9)
ENA SCL70 AB SER-ACNC: <0.2 AI (ref 0–0.9)
ENA SM AB SER-ACNC: <0.2 AI (ref 0–0.9)
ENA SS-A AB SER-ACNC: <0.2 AI (ref 0–0.9)
ENA SS-B AB SER-ACNC: <0.2 AI (ref 0–0.9)
Lab: ABNORMAL

## 2019-06-21 LAB
QUANTIFERON CRITERIA: NORMAL
QUANTIFERON INCUBATION: NORMAL
QUANTIFERON MITOGEN VALUE: >10 IU/ML
QUANTIFERON NIL VALUE: 0.05 IU/ML
QUANTIFERON TB1 AG VALUE: 0.06 IU/ML
QUANTIFERON TB2 AG VALUE: 0.06 IU/ML
QUANTIFERON-TB GOLD PLUS: NEGATIVE

## 2019-07-02 ENCOUNTER — HOSPITAL ENCOUNTER (OUTPATIENT)
Dept: CARDIOLOGY | Facility: HOSPITAL | Age: 44
Discharge: HOME | End: 2019-07-02
Admitting: FAMILY MEDICINE

## 2019-07-02 PROCEDURE — 93306 TTE W/DOPPLER COMPLETE: CPT

## 2019-07-02 PROCEDURE — 93306 TTE W/DOPPLER COMPLETE: CPT | Performed by: INTERNAL MEDICINE

## 2019-07-03 LAB
BH CV ECHO MEAS - % IVS THICK: -1.7 %
BH CV ECHO MEAS - % LVPW THICK: 5.7 %
BH CV ECHO MEAS - ACS: 1.9 CM
BH CV ECHO MEAS - AO MAX PG: 6.8 MMHG
BH CV ECHO MEAS - AO MEAN PG: 3.6 MMHG
BH CV ECHO MEAS - AO ROOT AREA (BSA CORRECTED): 2.1
BH CV ECHO MEAS - AO ROOT AREA: 10.5 CM^2
BH CV ECHO MEAS - AO ROOT DIAM: 3.7 CM
BH CV ECHO MEAS - AO V2 MAX: 130.3 CM/SEC
BH CV ECHO MEAS - AO V2 MEAN: 85.6 CM/SEC
BH CV ECHO MEAS - AO V2 VTI: 31.7 CM
BH CV ECHO MEAS - BSA(HAYCOCK): 1.8 M^2
BH CV ECHO MEAS - BSA: 1.8 M^2
BH CV ECHO MEAS - BZI_BMI: 24.5 KILOGRAMS/M^2
BH CV ECHO MEAS - BZI_METRIC_HEIGHT: 167.6 CM
BH CV ECHO MEAS - BZI_METRIC_WEIGHT: 68.9 KG
BH CV ECHO MEAS - EDV(CUBED): 77 ML
BH CV ECHO MEAS - EDV(MOD-SP4): 38 ML
BH CV ECHO MEAS - EDV(TEICH): 81 ML
BH CV ECHO MEAS - EF(CUBED): 74.3 %
BH CV ECHO MEAS - EF(MOD-SP4): 63.2 %
BH CV ECHO MEAS - EF(TEICH): 66.5 %
BH CV ECHO MEAS - ESV(CUBED): 19.8 ML
BH CV ECHO MEAS - ESV(MOD-SP4): 14 ML
BH CV ECHO MEAS - ESV(TEICH): 27.2 ML
BH CV ECHO MEAS - FS: 36.4 %
BH CV ECHO MEAS - IVS/LVPW: 1
BH CV ECHO MEAS - IVSD: 1.1 CM
BH CV ECHO MEAS - IVSS: 1.1 CM
BH CV ECHO MEAS - LA DIMENSION: 3 CM
BH CV ECHO MEAS - LA/AO: 0.83
BH CV ECHO MEAS - LV DIASTOLIC VOL/BSA (35-75): 21.3 ML/M^2
BH CV ECHO MEAS - LV MASS(C)D: 163.3 GRAMS
BH CV ECHO MEAS - LV MASS(C)DI: 91.8 GRAMS/M^2
BH CV ECHO MEAS - LV MASS(C)S: 86.8 GRAMS
BH CV ECHO MEAS - LV MASS(C)SI: 48.8 GRAMS/M^2
BH CV ECHO MEAS - LV SYSTOLIC VOL/BSA (12-30): 7.9 ML/M^2
BH CV ECHO MEAS - LVIDD: 4.3 CM
BH CV ECHO MEAS - LVIDS: 2.7 CM
BH CV ECHO MEAS - LVLD AP4: 5.6 CM
BH CV ECHO MEAS - LVLS AP4: 5.2 CM
BH CV ECHO MEAS - LVOT AREA (M): 3.8 CM^2
BH CV ECHO MEAS - LVOT AREA: 3.9 CM^2
BH CV ECHO MEAS - LVOT DIAM: 2.2 CM
BH CV ECHO MEAS - LVPWD: 1.1 CM
BH CV ECHO MEAS - LVPWS: 1.2 CM
BH CV ECHO MEAS - MV A MAX VEL: 61.2 CM/SEC
BH CV ECHO MEAS - MV E MAX VEL: 70.1 CM/SEC
BH CV ECHO MEAS - MV E/A: 1.1
BH CV ECHO MEAS - PA ACC SLOPE: 984 CM/SEC^2
BH CV ECHO MEAS - PA ACC TIME: 0.13 SEC
BH CV ECHO MEAS - PA PR(ACCEL): 18.8 MMHG
BH CV ECHO MEAS - RAP SYSTOLE: 10 MMHG
BH CV ECHO MEAS - RVSP: 37.2 MMHG
BH CV ECHO MEAS - SI(AO): 187.5 ML/M^2
BH CV ECHO MEAS - SI(CUBED): 32.1 ML/M^2
BH CV ECHO MEAS - SI(MOD-SP4): 13.5 ML/M^2
BH CV ECHO MEAS - SI(TEICH): 30.3 ML/M^2
BH CV ECHO MEAS - SV(AO): 333.8 ML
BH CV ECHO MEAS - SV(CUBED): 57.2 ML
BH CV ECHO MEAS - SV(MOD-SP4): 24 ML
BH CV ECHO MEAS - SV(TEICH): 53.9 ML
BH CV ECHO MEAS - TR MAX VEL: 260.6 CM/SEC
MAXIMAL PREDICTED HEART RATE: 177 BPM
STRESS TARGET HR: 150 BPM

## 2019-07-08 NOTE — PROGRESS NOTES
"Meghan Rivas     VITALS: Blood pressure 120/82, pulse 88, temperature 99.3 °F (37.4 °C), temperature source Oral, height 167.6 cm (65.98\"), weight 68.9 kg (152 lb), SpO2 99 %, not currently breastfeeding.    Subjective  Chief Complaint:   Chief Complaint   Patient presents with   • Fatigue   • Rectal Bleeding   • Shortness of Breath        History of Present Illness:  Patient is a 43 y.o.  female who presents to clinic secondary to medical followup. She complains today of increasing fatigue, shortness of breath, and possible rectal bleeding. She states that she is coughing all the time. Occasional night sweats. She is not coughing up blood. She denies any fevers, chills, congestion, rhinorrhea. The coughing is nonproductive. She was recently in skilled nursing. BRBPR is off and on. She denies any dizziness. Recently diagnosed with Hepatitis C.    Anxiety   Onset was more than 5 years ago. The problem has been gradually worsening. Symptoms include decreased concentration, depressed mood, dizziness, insomnia, irritability, malaise, nervous/anxious behavior, panic and restlessness. Patient reports no suicidal ideas. 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 Symptoms occur constantly. The severity of symptoms is causing significant distress. The quality of sleep is fair. Nighttime awakenings: one to two.      Risk factors: hx of drug abuse. Her past medical history is significant for anxiety/panic attacks and depression. Past treatments include SSRIs. Compliance with prior treatments has been variable.     Depression   Onset of symptoms: more than 5 years ago  Progression since onset: gradually worsening  Patient presents with the following symptoms: decreased concentration, depressed mood, dizziness, fatigue, insomnia, irritability, malaise, nervousness/anxiety, panic, restlessness and weight loss.  Patient is not experiencing: suicidal ideas and suicidal " planning.  Frequency of symptoms: constantly   Severity: causing significant distress   Risk factors: illicit drug use  Patient has a history of: anxiety/panic attacks, depression and mental illness  Treatment tried: non-SSRI antidepressants and SSRI  Compliance with treatment: variable  Improvement on treatment: mild     Lower Extremity Issue   This is a chronic problem. The current episode started more than 1 year ago. The problem occurs daily. The problem has been unchanged. Associated symptoms include abdominal pain, coughing, fatigue and myalgias. Associated symptoms comments: Patient has several areas behind her right knee area that are scarred from IV drug use;  N/T in right leg and foot . The symptoms are aggravated by walking. She has not tried anything for the symptoms.     Heartburn   She complains of abdominal pain, coughing and heartburn. Patient has hx of elicit drug use; states she has been clean for 19 months now. The current episode started more than 1 month ago. The problem occurs frequently. The problem has been unchanged. The heartburn does not wake her from sleep. The heartburn does not limit her activity. The heartburn doesn't change with position. Nothing aggravates the symptoms. Associated symptoms include fatigue and weight loss. Risk factors include smoking/tobacco exposure. She has not tried anything for the symptoms.     Flank Pain   This is a recurrent problem. The current episode started more than 1 month ago. The problem occurs daily. The problem is unchanged. The quality of the pain is described as aching and cramping. The pain does not radiate. The pain is at a severity of 6/10. The pain is moderate. The pain is the same all the time. Associated symptoms include abdominal pain and weight loss. (Pt had CT done which showed renal nodule) She has not tried anything for the symptoms.     Drug Problem   Primary symptoms comment: pt has hx of elicit drug use; states she has been clean for  19 months now. This is a chronic problem. The current episode started more than 1 year ago. The problem has been resolved since onset. Suspected agents include methamphetamines. Past treatments include methadone and outpatient rehab. The treatment provided significant relief. Her past medical history is significant for addiction treatment and a mental illness. She has been following with suboxone clinic and [facility] in the past for treatment of her drug addiction.  She has been taking suboxone and depakote for her symptoms and treatment.  She states that the depakote helped for a few months but has stopped working.        No complaints about any of the medications.    The following portions of the patient's history were reviewed and updated as appropriate: allergies, current medications, past family history, past medical history, past social history, past surgical history and problem list.    Past Medical History  Past Medical History:   Diagnosis Date   • Anxiety    • Depression    • Hepatitis C    • History of drug abuse    • Seizures (CMS/[facility])        Review of Systems  Constitutional: Denies any recent history of dizziness, fevers, chills, itching.  Eyes: Denies any changes in vision. Denies any blurry vision or diplopia.  Ears, Nose, Mouth, Throat: Denies any sore throat, rhinorrhea, or cough.  Cardiovascular: Denies any chest pain, pressure, or palpitations.  Gastrointestinal: Denies any abdominal pain, nausea, vomiting, diarrhea, or constipation.  Genitourinary: Denies any changes in urination.  Musculoskeletal: Denies any muscle weakness.  Skin and/or breasts: Denies any rashes.  Neurological: Denies any changes in balance or gait.  Psychiatric: Denies any anxiety, depression, or insomnia. Denies any suicidal or homicidal ideations.  Endocrine: Denies any heat or cold intolerance. Denies any voice changes, polydipsia, or polyuria.  Hematologic/Lymphatic: Denies any anemia or easy bruising.    Surgical  History  Past Surgical History:   Procedure Laterality Date   • OVARIAN CYST SURGERY      x7 surgeries       Family History  Family History   Adopted: Yes       Social History  Social History     Socioeconomic History   • Marital status:      Spouse name: Not on file   • Number of children: Not on file   • Years of education: Not on file   • Highest education level: Not on file   Tobacco Use   • Smoking status: Former Smoker     Packs/day: 0.50     Types: Cigarettes   • Smokeless tobacco: Never Used   Substance and Sexual Activity   • Alcohol use: No     Frequency: Never   • Drug use: Yes     Types: IV, Methamphetamines     Comment: quit 19 months ago   • Sexual activity: Defer       Objective  Physical Exam    Gen: Patient in NAD. Pleasant and answers appropriately. A&Ox3.    Skin: Warm and dry with normal turgor. No purpura, rashes, or unusual pigmentation noted. Hair is normal in appearance and distribution.    HEENT: NC/AT. No lesions noted. Conjunctiva clear, sclera nonicteric. PERRL. EOMI without nystagmus or strabismus. Fundi appear benign. No hemorrhages or exudates of eyes. Auditory canals are patent bilaterally without lesions. TMs intact,  nonerythematous, nonbulging without lesions. Nasal mucosa pink, nonerythematous, and nonedematous. Frontal and maxillary sinuses are nontender. O/P nonerythematous and moist without exudate.    Neck: Supple without lymph nodes palpated. FROM.     Lungs: Decreased B/L without rales, rhonchi, crackles, or wheezes.    Heart: RRR. S1 and S2 normal. No S3 or S4. No MRGT.    Abd: Soft, nontender,nondistended. (+)BSx4 quadrants.     Extrem: No CCE. Radial pulses 2+/4 and equal B/L. FROMx4. No bone, joint, or muscle tenderness noted.    Neuro: No focal motor/sensory deficits.    Procedures    Assessment/Plan  Meghan Rivas is a 43 y.o. here for medical followup.  Diagnoses and all orders for this visit:    Hep C w/o coma, chronic (CMS/HCC)  Patient to call Hepatitis  Clinic and reschedule her appointment.     Interstitial pneumonia (CMS/HCC)  -     ipratropium-albuterol (DUO-NEB) nebulizer solution 3 mL  -     XR Chest 2 View  -     QuantiFERON TB Gold; Future  -     RADHA by IFA, Reflex 9-biomarkers profile; Future  -     HIV-1/O/2 Ag/Ab w Reflex; Future  -     Uric Acid; Future  -     Rheumatoid Factor; Future  -     Cyclic Citrul Peptide Antibody, IgG / IgA; Future  -     Sedimentation Rate; Future  -     C-reactive Protein; Future  -     azithromycin (ZITHROMAX Z-JUDI) 250 MG tablet; Take 2 tablets the first day, then 1 tablet daily for 4 days.  -     predniSONE (DELTASONE) 10 MG tablet; Take 40 mg orally daily x 4 days, then 30 mg orally daily x 3 days, then 20 mg orally daily x 2 days, then 10 mg daily x 1 day.  -     ipratropium-albuterol (DUO-NEB) 0.5-2.5 mg/3 ml nebulizer; Take 3 mL by nebulization Every 4 (Four) Hours As Needed for Wheezing.  -     QuantiFERON TB Gold  -     RADHA by IFA, Reflex 9-biomarkers profile  -     HIV-1/O/2 Ag/Ab w Reflex  -     Uric Acid  -     Rheumatoid Factor  -     Cyclic Citrul Peptide Antibody, IgG / IgA  -     Sedimentation Rate  -     C-reactive Protein  -     ULISSES+DNA/DS+Antich+Centro+FA..; Future  -     ULISSES+DNA/DS+Antich+Centro+FA..    MVP (mitral valve prolapse)  -     Adult Transthoracic Echo Complete W/ Cont if Necessary Per Protocol    Current moderate episode of major depressive disorder without prior episode (CMS/HCC)  -     ARIPiprazole (ABILIFY) 5 MG tablet; Take 1 tablet by mouth Daily.    Rectal bleeding  CBC stable. Will monitor. If continues, will need colonoscopy.     Patient's Body mass index is 24.55 kg/m². BMI is within normal parameters. No follow-up required..     Findings and plans discussed with patient who verbalizes understanding and agreement. Will followup with patient once results are in. Patient to followup at clinic PRN or in one week for further medical followup.    Salome Li MD    EMR  Dragon/Transcription Disclaimer:  Much of this encounter note is an electronic transcription/translation of spoken language to printed text.  The electronic translation of spoken language may permit erroneous, or at times, nonsensical words or phrases to be inadvertently transcribed.  Although I have reviewed the note for such errors, some may still exist.

## 2019-07-12 ENCOUNTER — OFFICE VISIT (OUTPATIENT)
Dept: FAMILY MEDICINE CLINIC | Facility: CLINIC | Age: 44
End: 2019-07-12

## 2019-07-12 VITALS
OXYGEN SATURATION: 97 % | BODY MASS INDEX: 25.88 KG/M2 | TEMPERATURE: 98.5 F | DIASTOLIC BLOOD PRESSURE: 90 MMHG | WEIGHT: 161 LBS | HEIGHT: 66 IN | HEART RATE: 89 BPM | SYSTOLIC BLOOD PRESSURE: 121 MMHG

## 2019-07-12 DIAGNOSIS — J84.9 INTERSTITIAL PNEUMONIA (HCC): Primary | ICD-10-CM

## 2019-07-12 DIAGNOSIS — J30.89 SEASONAL ALLERGIC RHINITIS DUE TO OTHER ALLERGIC TRIGGER: ICD-10-CM

## 2019-07-12 DIAGNOSIS — K21.9 GASTROESOPHAGEAL REFLUX DISEASE, ESOPHAGITIS PRESENCE NOT SPECIFIED: ICD-10-CM

## 2019-07-12 LAB
ALBUMIN SERPL-MCNC: 4.2 G/DL (ref 3.5–5.2)
ALBUMIN/GLOB SERPL: 1.2 G/DL
ALP SERPL-CCNC: 43 U/L (ref 39–117)
ALT SERPL W P-5'-P-CCNC: 15 U/L (ref 1–33)
ANION GAP SERPL CALCULATED.3IONS-SCNC: 11.7 MMOL/L (ref 5–15)
AST SERPL-CCNC: 21 U/L (ref 1–32)
BASOPHILS # BLD AUTO: 0.04 10*3/MM3 (ref 0–0.2)
BASOPHILS NFR BLD AUTO: 0.6 % (ref 0–1.5)
BILIRUB SERPL-MCNC: 0.2 MG/DL (ref 0.2–1.2)
BUN BLD-MCNC: 19 MG/DL (ref 6–20)
BUN/CREAT SERPL: 32.2 (ref 7–25)
CALCIUM SPEC-SCNC: 9.7 MG/DL (ref 8.6–10.5)
CHLORIDE SERPL-SCNC: 103 MMOL/L (ref 98–107)
CO2 SERPL-SCNC: 22.3 MMOL/L (ref 22–29)
CREAT BLD-MCNC: 0.59 MG/DL (ref 0.57–1)
DEPRECATED RDW RBC AUTO: 47.3 FL (ref 37–54)
EOSINOPHIL # BLD AUTO: 0.19 10*3/MM3 (ref 0–0.4)
EOSINOPHIL NFR BLD AUTO: 2.8 % (ref 0.3–6.2)
ERYTHROCYTE [DISTWIDTH] IN BLOOD BY AUTOMATED COUNT: 13.9 % (ref 12.3–15.4)
GFR SERPL CREATININE-BSD FRML MDRD: 111 ML/MIN/1.73
GLOBULIN UR ELPH-MCNC: 3.6 GM/DL
GLUCOSE BLD-MCNC: 79 MG/DL (ref 65–99)
HCT VFR BLD AUTO: 44.3 % (ref 34–46.6)
HGB BLD-MCNC: 13.8 G/DL (ref 12–15.9)
IMM GRANULOCYTES # BLD AUTO: 0.02 10*3/MM3 (ref 0–0.05)
IMM GRANULOCYTES NFR BLD AUTO: 0.3 % (ref 0–0.5)
LYMPHOCYTES # BLD AUTO: 2.99 10*3/MM3 (ref 0.7–3.1)
LYMPHOCYTES NFR BLD AUTO: 43.6 % (ref 19.6–45.3)
MCH RBC QN AUTO: 28.8 PG (ref 26.6–33)
MCHC RBC AUTO-ENTMCNC: 31.2 G/DL (ref 31.5–35.7)
MCV RBC AUTO: 92.3 FL (ref 79–97)
MONOCYTES # BLD AUTO: 0.6 10*3/MM3 (ref 0.1–0.9)
MONOCYTES NFR BLD AUTO: 8.7 % (ref 5–12)
NEUTROPHILS # BLD AUTO: 3.02 10*3/MM3 (ref 1.7–7)
NEUTROPHILS NFR BLD AUTO: 44 % (ref 42.7–76)
NRBC BLD AUTO-RTO: 0.1 /100 WBC (ref 0–0.2)
PLATELET # BLD AUTO: 226 10*3/MM3 (ref 140–450)
PMV BLD AUTO: 11.3 FL (ref 6–12)
POTASSIUM BLD-SCNC: 4.2 MMOL/L (ref 3.5–5.2)
PROT SERPL-MCNC: 7.8 G/DL (ref 6–8.5)
RBC # BLD AUTO: 4.8 10*6/MM3 (ref 3.77–5.28)
SODIUM BLD-SCNC: 137 MMOL/L (ref 136–145)
WBC NRBC COR # BLD: 6.86 10*3/MM3 (ref 3.4–10.8)

## 2019-07-12 PROCEDURE — 99214 OFFICE O/P EST MOD 30 MIN: CPT | Performed by: FAMILY MEDICINE

## 2019-07-12 PROCEDURE — 85025 COMPLETE CBC W/AUTO DIFF WBC: CPT | Performed by: FAMILY MEDICINE

## 2019-07-12 PROCEDURE — 80053 COMPREHEN METABOLIC PANEL: CPT | Performed by: FAMILY MEDICINE

## 2019-07-29 NOTE — PROGRESS NOTES
"Meghan Rivas     VITALS: Blood pressure 121/90, pulse 89, temperature 98.5 °F (36.9 °C), temperature source Oral, height 167.6 cm (65.98\"), weight 73 kg (161 lb), SpO2 97 %, not currently breastfeeding.    Subjective  Chief Complaint:   Chief Complaint   Patient presents with   • Follow-up     labs         History of Present Illness:  Patient is a 43 y.o.  female who presents to clinic secondary to medical followup. No new or acute concerns. She continues to have increasing fatigue, shortness of breath, and possible rectal bleeding. She states that she is coughing all the time. Occasional night sweats. She is not coughing up blood. She denies any fevers, chills, congestion, rhinorrhea. The coughing is nonproductive. She was recently in nursing home. BRBPR is off and on. She denies any dizziness. Recently diagnosed with Hepatitis C. Was treated for pneumonia - she is not sure if she finished all the antibiotics.     Anxiety   Onset was more than 5 years ago. The problem has been gradually worsening. Symptoms include decreased concentration, depressed mood, dizziness, insomnia, irritability, malaise, nervous/anxious behavior, panic and restlessness. Patient reports no suicidal ideas. Primary symptoms comment: pt has hx of elicit drug use; states she has been clean for 19 months now. Symptoms occur constantly. The severity of symptoms is causing significant distress. The quality of sleep is fair. Nighttime awakenings: one to two.      Risk factors: hx of drug abuse. Her past medical history is significant for anxiety/panic attacks and depression. Past treatments include SSRIs. Compliance with prior treatments has been variable.      Depression   Onset of symptoms: more than 5 years ago  Progression since onset: gradually worsening  Patient presents with the following symptoms: decreased concentration, depressed mood, dizziness, fatigue, insomnia, irritability, malaise, nervousness/anxiety, panic, restlessness and weight " loss.  Patient is not experiencing: suicidal ideas and suicidal planning.  Frequency of symptoms: constantly   Severity: causing significant distress   Risk factors: illicit drug use  Patient has a history of: anxiety/panic attacks, depression and mental illness  Treatment tried: non-SSRI antidepressants and SSRI  Compliance with treatment: variable  Improvement on treatment: mild     Lower Extremity Issue   This is a chronic problem. The current episode started more than 1 year ago. The problem occurs daily. The problem has been unchanged. Associated symptoms include abdominal pain, coughing, fatigue and myalgias. Associated symptoms comments: Patient has several areas behind her right knee area that are scarred from IV drug use;  N/T in right leg and foot . The symptoms are aggravated by walking. She has not tried anything for the symptoms.      Heartburn   She complains of abdominal pain, coughing and heartburn. Patient has hx of elicit drug use; states she has been clean for 19 months now. The current episode started more than 1 month ago. The problem occurs frequently. The problem has been unchanged. The heartburn does not wake her from sleep. The heartburn does not limit her activity. The heartburn doesn't change with position. Nothing aggravates the symptoms. Associated symptoms include fatigue and weight loss. Risk factors include smoking/tobacco exposure. She has not tried anything for the symptoms.      Flank Pain   This is a recurrent problem. The current episode started more than 1 month ago. The problem occurs daily. The problem is unchanged. The quality of the pain is described as aching and cramping. The pain does not radiate. The pain is at a severity of 6/10. The pain is moderate. The pain is the same all the time. Associated symptoms include abdominal pain and weight loss. (Pt had CT done which showed renal nodule) She has not tried anything for the symptoms.      Drug Problem   Primary symptoms  comment: pt has hx of elicit drug use; states she has been clean for 19 months now. This is a chronic problem. The current episode started more than 1 year ago. The problem has been resolved since onset. Suspected agents include methamphetamines. Past treatments include methadone and outpatient rehab. The treatment provided significant relief. Her past medical history is significant for addiction treatment and a mental illness. She has been following with suboxone clinic and [facility] in the past for treatment of her drug addiction.  She has been taking suboxone and depakote for her symptoms and treatment.  She states that the depakote helped for a few months but has stopped working.          No complaints about any of the medications.    The following portions of the patient's history were reviewed and updated as appropriate: allergies, current medications, past family history, past medical history, past social history, past surgical history and problem list.    Past Medical History  Past Medical History:   Diagnosis Date   • Anxiety    • Depression    • Hepatitis C    • History of drug abuse    • Seizures (CMS/[facility])        Review of Systems  Constitutional: Denies any recent history of HAs, dizziness, fevers, chills, itching.  Eyes: Denies any changes in vision. Denies any blurry vision or diplopia.  Ears, Nose, Mouth, Throat: Denies any sore throat, rhinorrhea, or cough.  Cardiovascular: Denies any chest pain, pressure, or palpitations.  Respiratory: Denies any shortness of breath or wheezing.  Gastrointestinal: Denies any abdominal pain, nausea, vomiting, diarrhea, or constipation.  Genitourinary: Denies any changes in urination.  Musculoskeletal: Denies any muscle weakness.  Skin and/or breasts: Denies any rashes.  Neurological: Denies any changes in balance or gait.  Psychiatric: Denies any anxiety, depression, or insomnia. Denies any suicidal or homicidal ideations.  Endocrine: Denies any heat or cold  intolerance. Denies any voice changes, polydipsia, or polyuria.  Hematologic/Lymphatic: Denies any anemia or easy bruising.    Surgical History  Past Surgical History:   Procedure Laterality Date   • OVARIAN CYST SURGERY      x7 surgeries       Family History  Family History   Adopted: Yes       Social History  Social History     Socioeconomic History   • Marital status:      Spouse name: Not on file   • Number of children: Not on file   • Years of education: Not on file   • Highest education level: Not on file   Tobacco Use   • Smoking status: Former Smoker     Packs/day: 0.50     Types: Cigarettes   • Smokeless tobacco: Never Used   Substance and Sexual Activity   • Alcohol use: No     Frequency: Never   • Drug use: Yes     Types: IV, Methamphetamines     Comment: quit 19 months ago   • Sexual activity: Defer       Objective  Physical Exam    Gen: Patient in NAD. Pleasant and answers appropriately. A&Ox3.    Skin: Warm and dry with normal turgor. No purpura, rashes, or unusual pigmentation noted. Hair is normal in appearance and distribution.    HEENT: NC/AT. No lesions noted. Conjunctiva clear, sclera nonicteric. PERRL. EOMI without nystagmus or strabismus. Fundi appear benign. No hemorrhages or exudates of eyes. Auditory canals are patent bilaterally without lesions. TMs intact,  nonerythematous, nonbulging without lesions. Nasal mucosa pink, nonerythematous, and nonedematous. Frontal and maxillary sinuses are nontender. O/P nonerythematous and moist without exudate.    Neck: Supple without lymph nodes palpated. FROM.     Lungs: CTA B/L without rales, rhonchi, crackles, or wheezes.    Heart: RRR. S1 and S2 normal. No S3 or S4. No MRGT.    Abd: Soft, nontender,nondistended. (+)BSx4 quadrants.     Extrem: No CCE. Radial pulses 2+/4 and equal B/L. FROMx4. No bone, joint, or muscle tenderness noted.    Neuro: No focal motor/sensory deficits.    Procedures    Assessment/Plan  Meghan Rivas is a 43 y.o. here  for medical followup.  Diagnoses and all orders for this visit:    Interstitial pneumonia (CMS/HCC)  -     Comprehensive Metabolic Panel; Future  -     CBC Auto Differential; Future  -     Comprehensive Metabolic Panel  -     CBC Auto Differential    GERD  Continue prilosec 40 mg orally daily.     Allergic rhinitis  Stable on singulair 10 mg orally daily.       Patient's Body mass index is 26 kg/m². BMI is above normal parameters. Recommendations include: exercise counseling and nutrition counseling.     Findings and plans discussed with patient who verbalizes understanding and agreement. Will followup with patient once results are in. Patient to followup at clinic PRN or in one month for further medical followup.    Salome Li MD    EMR Dragon/Transcription Disclaimer:  Much of this encounter note is an electronic transcription/translation of spoken language to printed text.  The electronic translation of spoken language may permit erroneous, or at times, nonsensical words or phrases to be inadvertently transcribed.  Although I have reviewed the note for such errors, some may still exist.

## 2019-09-10 ENCOUNTER — TELEPHONE (OUTPATIENT)
Dept: FAMILY MEDICINE CLINIC | Facility: CLINIC | Age: 44
End: 2019-09-10

## 2019-09-10 NOTE — TELEPHONE ENCOUNTER
Patient called requesting a refill on abilify states she missed her last appointment because she does not have insurance she is in the process of getting it back but does not want to go without her medication.

## 2019-09-11 DIAGNOSIS — F32.1 CURRENT MODERATE EPISODE OF MAJOR DEPRESSIVE DISORDER WITHOUT PRIOR EPISODE (HCC): ICD-10-CM

## 2019-09-11 RX ORDER — ARIPIPRAZOLE 5 MG/1
5 TABLET ORAL DAILY
Qty: 30 TABLET | Refills: 2 | Status: SHIPPED | OUTPATIENT
Start: 2019-09-11 | End: 2019-12-12

## 2019-12-08 ENCOUNTER — APPOINTMENT (OUTPATIENT)
Dept: CT IMAGING | Facility: HOSPITAL | Age: 44
End: 2019-12-08

## 2019-12-08 ENCOUNTER — HOSPITAL ENCOUNTER (EMERGENCY)
Facility: HOSPITAL | Age: 44
Discharge: HOME | End: 2019-12-08
Attending: EMERGENCY MEDICINE | Admitting: EMERGENCY MEDICINE

## 2019-12-08 ENCOUNTER — APPOINTMENT (OUTPATIENT)
Dept: GENERAL RADIOLOGY | Facility: HOSPITAL | Age: 44
End: 2019-12-08

## 2019-12-08 VITALS
SYSTOLIC BLOOD PRESSURE: 107 MMHG | DIASTOLIC BLOOD PRESSURE: 77 MMHG | BODY MASS INDEX: 24.17 KG/M2 | HEART RATE: 92 BPM | RESPIRATION RATE: 20 BRPM | HEIGHT: 67 IN | TEMPERATURE: 98.6 F | OXYGEN SATURATION: 92 % | WEIGHT: 154 LBS

## 2019-12-08 DIAGNOSIS — J18.9 PNEUMONIA OF LEFT LOWER LOBE DUE TO INFECTIOUS ORGANISM: Primary | ICD-10-CM

## 2019-12-08 LAB
A-A DO2: 24.8 MMHG (ref 0–300)
ALBUMIN SERPL-MCNC: 4.26 G/DL (ref 3.5–5.2)
ALBUMIN/GLOB SERPL: 1.2 G/DL
ALP SERPL-CCNC: 39 U/L (ref 39–117)
ALT SERPL W P-5'-P-CCNC: 5 U/L (ref 1–33)
ANION GAP SERPL CALCULATED.3IONS-SCNC: 12.6 MMOL/L (ref 5–15)
ARTERIAL PATENCY WRIST A: POSITIVE
AST SERPL-CCNC: 23 U/L (ref 1–32)
ATMOSPHERIC PRESS: 731 MMHG
B-HCG UR QL: NEGATIVE
BACTERIA UR QL AUTO: ABNORMAL /HPF
BASE EXCESS BLDA CALC-SCNC: -0.1 MMOL/L (ref 0–2)
BASOPHILS # BLD AUTO: 0.01 10*3/MM3 (ref 0–0.2)
BASOPHILS NFR BLD AUTO: 0.2 % (ref 0–1.5)
BDY SITE: ABNORMAL
BILIRUB SERPL-MCNC: 0.3 MG/DL (ref 0.2–1.2)
BILIRUB UR QL STRIP: NEGATIVE
BODY TEMPERATURE: 0 C
BUN BLD-MCNC: 17 MG/DL (ref 6–20)
BUN/CREAT SERPL: 30.4 (ref 7–25)
CALCIUM SPEC-SCNC: 9.7 MG/DL (ref 8.6–10.5)
CHLORIDE SERPL-SCNC: 101 MMOL/L (ref 98–107)
CLARITY UR: ABNORMAL
CO2 BLDA-SCNC: 23.8 MMOL/L (ref 22–33)
CO2 SERPL-SCNC: 23.4 MMOL/L (ref 22–29)
COHGB MFR BLD: 1.4 % (ref 0–5)
COLOR UR: YELLOW
CREAT BLD-MCNC: 0.56 MG/DL (ref 0.57–1)
D-LACTATE SERPL-SCNC: 0.8 MMOL/L (ref 0.5–2)
DEPRECATED RDW RBC AUTO: 42.2 FL (ref 37–54)
EOSINOPHIL # BLD AUTO: 0.04 10*3/MM3 (ref 0–0.4)
EOSINOPHIL NFR BLD AUTO: 0.9 % (ref 0.3–6.2)
ERYTHROCYTE [DISTWIDTH] IN BLOOD BY AUTOMATED COUNT: 13.4 % (ref 12.3–15.4)
GFR SERPL CREATININE-BSD FRML MDRD: 118 ML/MIN/1.73
GLOBULIN UR ELPH-MCNC: 3.5 GM/DL
GLUCOSE BLD-MCNC: 94 MG/DL (ref 65–99)
GLUCOSE UR STRIP-MCNC: NEGATIVE MG/DL
HCO3 BLDA-SCNC: 22.8 MMOL/L (ref 20–26)
HCT VFR BLD AUTO: 39.4 % (ref 34–46.6)
HCT VFR BLD CALC: 41.4 % (ref 38–51)
HGB BLD-MCNC: 13.5 G/DL (ref 12–15.9)
HGB BLDA-MCNC: 13.5 G/DL (ref 13.5–17.5)
HGB UR QL STRIP.AUTO: ABNORMAL
HOROWITZ INDEX BLD+IHG-RTO: 21 %
HYALINE CASTS UR QL AUTO: ABNORMAL /LPF
IMM GRANULOCYTES # BLD AUTO: 0.01 10*3/MM3 (ref 0–0.05)
IMM GRANULOCYTES NFR BLD AUTO: 0.2 % (ref 0–0.5)
KETONES UR QL STRIP: ABNORMAL
LEUKOCYTE ESTERASE UR QL STRIP.AUTO: NEGATIVE
LYMPHOCYTES # BLD AUTO: 1.84 10*3/MM3 (ref 0.7–3.1)
LYMPHOCYTES NFR BLD AUTO: 39.1 % (ref 19.6–45.3)
Lab: ABNORMAL
MCH RBC QN AUTO: 29.5 PG (ref 26.6–33)
MCHC RBC AUTO-ENTMCNC: 34.3 G/DL (ref 31.5–35.7)
MCV RBC AUTO: 86 FL (ref 79–97)
METHGB BLD QL: 0.1 % (ref 0–3)
MODALITY: ABNORMAL
MONOCYTES # BLD AUTO: 0.39 10*3/MM3 (ref 0.1–0.9)
MONOCYTES NFR BLD AUTO: 8.3 % (ref 5–12)
NEUTROPHILS # BLD AUTO: 2.41 10*3/MM3 (ref 1.7–7)
NEUTROPHILS NFR BLD AUTO: 51.3 % (ref 42.7–76)
NITRITE UR QL STRIP: NEGATIVE
NOTE: ABNORMAL
NRBC BLD AUTO-RTO: 0 /100 WBC (ref 0–0.2)
OVALOCYTES BLD QL SMEAR: NORMAL
OXYHGB MFR BLDV: 95.7 % (ref 94–99)
PCO2 BLDA: 31.4 MM HG (ref 35–45)
PCO2 TEMP ADJ BLD: ABNORMAL MM[HG]
PH BLDA: 7.47 PH UNITS (ref 7.35–7.45)
PH UR STRIP.AUTO: 5.5 [PH] (ref 5–8)
PH, TEMP CORRECTED: ABNORMAL
PLATELET # BLD AUTO: 153 10*3/MM3 (ref 140–450)
PMV BLD AUTO: 9.5 FL (ref 6–12)
PO2 BLDA: 83.5 MM HG (ref 83–108)
PO2 TEMP ADJ BLD: ABNORMAL MM[HG]
POTASSIUM BLD-SCNC: 4.2 MMOL/L (ref 3.5–5.2)
PROT SERPL-MCNC: 7.8 G/DL (ref 6–8.5)
PROT UR QL STRIP: ABNORMAL
RBC # BLD AUTO: 4.58 10*6/MM3 (ref 3.77–5.28)
RBC # UR: ABNORMAL /HPF
REF LAB TEST METHOD: ABNORMAL
SAO2 % BLDCOA: 97.2 % (ref 94–99)
SMALL PLATELETS BLD QL SMEAR: ADEQUATE
SODIUM BLD-SCNC: 137 MMOL/L (ref 136–145)
SP GR UR STRIP: >=1.03 (ref 1–1.03)
SQUAMOUS #/AREA URNS HPF: ABNORMAL /HPF
UROBILINOGEN UR QL STRIP: ABNORMAL
VENTILATOR MODE: ABNORMAL
WBC NRBC COR # BLD: 4.7 10*3/MM3 (ref 3.4–10.8)
WBC UR QL AUTO: ABNORMAL /HPF

## 2019-12-08 PROCEDURE — 81025 URINE PREGNANCY TEST: CPT | Performed by: PHYSICIAN ASSISTANT

## 2019-12-08 PROCEDURE — 83605 ASSAY OF LACTIC ACID: CPT | Performed by: PHYSICIAN ASSISTANT

## 2019-12-08 PROCEDURE — 94640 AIRWAY INHALATION TREATMENT: CPT

## 2019-12-08 PROCEDURE — 71046 X-RAY EXAM CHEST 2 VIEWS: CPT

## 2019-12-08 PROCEDURE — 82375 ASSAY CARBOXYHB QUANT: CPT

## 2019-12-08 PROCEDURE — 80053 COMPREHEN METABOLIC PANEL: CPT | Performed by: PHYSICIAN ASSISTANT

## 2019-12-08 PROCEDURE — 94799 UNLISTED PULMONARY SVC/PX: CPT

## 2019-12-08 PROCEDURE — 87040 BLOOD CULTURE FOR BACTERIA: CPT | Performed by: PHYSICIAN ASSISTANT

## 2019-12-08 PROCEDURE — 82805 BLOOD GASES W/O2 SATURATION: CPT

## 2019-12-08 PROCEDURE — 83050 HGB METHEMOGLOBIN QUAN: CPT

## 2019-12-08 PROCEDURE — 85025 COMPLETE CBC W/AUTO DIFF WBC: CPT | Performed by: PHYSICIAN ASSISTANT

## 2019-12-08 PROCEDURE — 96365 THER/PROPH/DIAG IV INF INIT: CPT

## 2019-12-08 PROCEDURE — 99283 EMERGENCY DEPT VISIT LOW MDM: CPT

## 2019-12-08 PROCEDURE — 81001 URINALYSIS AUTO W/SCOPE: CPT | Performed by: PHYSICIAN ASSISTANT

## 2019-12-08 PROCEDURE — 96367 TX/PROPH/DG ADDL SEQ IV INF: CPT

## 2019-12-08 PROCEDURE — 85007 BL SMEAR W/DIFF WBC COUNT: CPT | Performed by: PHYSICIAN ASSISTANT

## 2019-12-08 PROCEDURE — 25010000002 CEFTRIAXONE: Performed by: PHYSICIAN ASSISTANT

## 2019-12-08 PROCEDURE — 74176 CT ABD & PELVIS W/O CONTRAST: CPT

## 2019-12-08 PROCEDURE — 36600 WITHDRAWAL OF ARTERIAL BLOOD: CPT

## 2019-12-08 RX ORDER — BENZONATATE 100 MG/1
100 CAPSULE ORAL 3 TIMES DAILY PRN
Qty: 20 CAPSULE | Refills: 0 | Status: SHIPPED | OUTPATIENT
Start: 2019-12-08 | End: 2020-03-19

## 2019-12-08 RX ORDER — DOXYCYCLINE HYCLATE 100 MG/1
100 TABLET, DELAYED RELEASE ORAL 2 TIMES DAILY
Qty: 14 TABLET | Refills: 0 | Status: SHIPPED | OUTPATIENT
Start: 2019-12-08 | End: 2019-12-12

## 2019-12-08 RX ORDER — ALBUTEROL SULFATE 90 UG/1
2 AEROSOL, METERED RESPIRATORY (INHALATION) EVERY 4 HOURS PRN
Qty: 6.7 G | Refills: 0 | Status: SHIPPED | OUTPATIENT
Start: 2019-12-08 | End: 2020-03-19 | Stop reason: SDUPTHER

## 2019-12-08 RX ORDER — SODIUM CHLORIDE 0.9 % (FLUSH) 0.9 %
10 SYRINGE (ML) INJECTION AS NEEDED
Status: DISCONTINUED | OUTPATIENT
Start: 2019-12-08 | End: 2019-12-08 | Stop reason: HOSPADM

## 2019-12-08 RX ORDER — IPRATROPIUM BROMIDE AND ALBUTEROL SULFATE 2.5; .5 MG/3ML; MG/3ML
3 SOLUTION RESPIRATORY (INHALATION) ONCE
Status: COMPLETED | OUTPATIENT
Start: 2019-12-08 | End: 2019-12-08

## 2019-12-08 RX ADMIN — SODIUM CHLORIDE 1000 ML: 9 INJECTION, SOLUTION INTRAVENOUS at 17:08

## 2019-12-08 RX ADMIN — CEFTRIAXONE 1 G: 1 INJECTION, POWDER, FOR SOLUTION INTRAMUSCULAR; INTRAVENOUS at 17:08

## 2019-12-08 RX ADMIN — IPRATROPIUM BROMIDE AND ALBUTEROL SULFATE 3 ML: 2.5; .5 SOLUTION RESPIRATORY (INHALATION) at 16:55

## 2019-12-08 RX ADMIN — DOXYCYCLINE 100 MG: 100 INJECTION, POWDER, LYOPHILIZED, FOR SOLUTION INTRAVENOUS at 17:45

## 2019-12-08 NOTE — ED NOTES
Gave patient a urine cup and wipes for urine collection. Patients states that she is unable to use the restroom at this time but will try when she can.      Nery Hunt  12/08/19 [id number]

## 2019-12-08 NOTE — ED PROVIDER NOTES
Subjective     History provided by:  Patient   used: No    Abdominal Pain   Pain location:  Generalized  Pain quality: aching    Pain radiates to:  Does not radiate  Pain severity:  Mild  Onset quality:  Sudden  Duration:  2 weeks  Timing:  Constant  Progression:  Worsening  Chronicity:  Chronic  Associated symptoms: cough and shortness of breath    Associated symptoms: no chest pain, no chills, no diarrhea, no dysuria, no fever, no nausea, no sore throat and no vomiting        Review of Systems   Constitutional: Negative for chills and fever.   HENT: Negative for congestion, ear pain and sore throat.    Respiratory: Positive for cough and shortness of breath. Negative for wheezing.    Cardiovascular: Negative for chest pain.   Gastrointestinal: Positive for abdominal pain. Negative for diarrhea, nausea and vomiting.   Genitourinary: Negative for dysuria and flank pain.   Skin: Negative for rash.   Neurological: Negative for headaches.   Psychiatric/Behavioral: The patient is not nervous/anxious.    All other systems reviewed and are negative.      Past Medical History:   Diagnosis Date   • Anxiety    • Depression    • Hepatitis C    • History of drug abuse (CMS/[facility])    • Seizures (CMS/[facility])        Allergies   Allergen Reactions   • Contrast Dye Anaphylaxis       Past Surgical History:   Procedure Laterality Date   • OVARIAN CYST SURGERY      x7 surgeries       Family History   Adopted: Yes       Social History     Socioeconomic History   • Marital status:      Spouse name: Not on file   • Number of children: Not on file   • Years of education: Not on file   • Highest education level: Not on file   Tobacco Use   • Smoking status: Former Smoker     Packs/day: 0.50     Types: Cigarettes   • Smokeless tobacco: Never Used   Substance and Sexual Activity   • Alcohol use: No     Frequency: Never   • Drug use: Yes     Types: IV, Methamphetamines     Comment: quit 19 months ago   • Sexual activity:  Defer           Objective   Physical Exam   Constitutional: She is oriented to person, place, and time. She appears well-developed and well-nourished.   HENT:   Head: Normocephalic.   Mouth/Throat: Oropharynx is clear and moist.   Neck: Neck supple.   Cardiovascular: Normal rate and regular rhythm.   Pulmonary/Chest: Effort normal. She has wheezes in the right lower field and the left lower field. She has rhonchi in the right lower field and the left lower field.   Abdominal: Soft. Bowel sounds are normal. There is no tenderness.   Musculoskeletal: Normal range of motion.   Neurological: She is alert and oriented to person, place, and time.   Skin: Skin is warm and dry.   Psychiatric: She has a normal mood and affect. Her behavior is normal. Judgment and thought content normal.   Nursing note and vitals reviewed.      Procedures           ED Course  ED Course as of Dec 12 0723   Sun Dec 08, 2019   2007 Report given to litzy     [LC]      ED Course User Index  [LC] Mary Irby PA                      No data recorded                        MDM    Final diagnoses:   Pneumonia of left lower lobe due to infectious organism (CMS/[facility])              Mary Irby PA  12/12/19 [id number]

## 2019-12-09 NOTE — ED NOTES
Discharge instructions reviewed with patient, patient instructed to return to ED if symptoms worsen or if any new problems arise. Patient verbalizes understanding of discharge instructions, patient ambulatory out of ED. No acute distress noted.     Rasta Collazo RN  12/08/19 2023

## 2019-12-12 ENCOUNTER — OFFICE VISIT (OUTPATIENT)
Dept: FAMILY MEDICINE CLINIC | Facility: CLINIC | Age: 44
End: 2019-12-12

## 2019-12-12 VITALS
DIASTOLIC BLOOD PRESSURE: 80 MMHG | OXYGEN SATURATION: 98 % | TEMPERATURE: 98.6 F | WEIGHT: 147 LBS | SYSTOLIC BLOOD PRESSURE: 102 MMHG | HEIGHT: 67 IN | BODY MASS INDEX: 23.07 KG/M2 | HEART RATE: 91 BPM

## 2019-12-12 DIAGNOSIS — R31.29 OTHER MICROSCOPIC HEMATURIA: ICD-10-CM

## 2019-12-12 DIAGNOSIS — R30.0 DYSURIA: ICD-10-CM

## 2019-12-12 DIAGNOSIS — R82.90 ABNORMAL URINALYSIS: ICD-10-CM

## 2019-12-12 DIAGNOSIS — F31.62 BIPOLAR DISORDER, CURRENT EPISODE MIXED, MODERATE (HCC): ICD-10-CM

## 2019-12-12 DIAGNOSIS — J84.9 INTERSTITIAL PNEUMONIA (HCC): ICD-10-CM

## 2019-12-12 DIAGNOSIS — R19.5 DARK STOOLS: Primary | ICD-10-CM

## 2019-12-12 LAB
BILIRUB BLD-MCNC: NEGATIVE MG/DL
CLARITY, POC: ABNORMAL
COLOR UR: ABNORMAL
GLUCOSE UR STRIP-MCNC: NEGATIVE MG/DL
KETONES UR QL: ABNORMAL
LEUKOCYTE EST, POC: NEGATIVE
NITRITE UR-MCNC: NEGATIVE MG/ML
PH UR: 6 [PH] (ref 5–8)
PROT UR STRIP-MCNC: ABNORMAL MG/DL
RBC # UR STRIP: ABNORMAL /UL
SP GR UR: 1.03 (ref 1–1.03)
UROBILINOGEN UR QL: NORMAL

## 2019-12-12 PROCEDURE — 87086 URINE CULTURE/COLONY COUNT: CPT | Performed by: NURSE PRACTITIONER

## 2019-12-12 PROCEDURE — 99214 OFFICE O/P EST MOD 30 MIN: CPT | Performed by: NURSE PRACTITIONER

## 2019-12-12 RX ORDER — RISPERIDONE 1 MG/1
1 TABLET ORAL NIGHTLY
Qty: 30 TABLET | Refills: 1 | Status: SHIPPED | OUTPATIENT
Start: 2019-12-12 | End: 2020-02-24 | Stop reason: SDUPTHER

## 2019-12-12 RX ORDER — AZITHROMYCIN 250 MG/1
TABLET, FILM COATED ORAL
Qty: 6 TABLET | Refills: 0 | Status: SHIPPED | OUTPATIENT
Start: 2019-12-12 | End: 2020-03-19

## 2019-12-12 RX ORDER — PHENTERMINE HYDROCHLORIDE 37.5 MG/1
37.5 TABLET ORAL
COMMUNITY
End: 2020-06-01

## 2019-12-12 NOTE — PROGRESS NOTES
Subjective   Meghan Rivas is a 44 y.o. female.     Chief Complaint: Follow-up (recent ER visit )    History of Present Illness   Pneumonia  Patient here today for follow-up from ER visit on 12/8/2019.  Patient went to the emergency room with complaints of cough and shortness of breath.  She did have a CT of the abdomen and pelvis and also a chest x-ray which did show that she had left lower lobe pneumonia.  She was given a prescription for doxycycline, Tessalon Perles and albuterol MDI.  Her oxygen level when she went to the emergency room was 92% on room air.  Today her oxygen level is up to 98% on room air.  Patient states that she is not having any issues with breathing.  She denies any further cough or congestion.  She could not tolerate the doxycycline.  She states that she started vomiting and had to stop taking it.  She has not had any doses since yesterday and her vomiting has stopped.    Bipolar Disorder  Patient does have a history of bipolar disorder and PTSD.  In the past she has tried Prozac, paxil, cymbalta, celexa without help with symptoms.  She was prescribed Abilify by Dr. Mccarty and she has taken this for the past 4 to 5 months.  She states that her symptoms are not well controlled at this time she does complain of depression.  She denies any suicidal or homicidal thoughts today.  She would like to try something else for her symptoms if possible.  Apparently she has also talked with [facility] and she is to stop by their office and complete paperwork.     Dark stools  Patient states that she has had dark stools over the past couple of months.  She also complains of pelvic pain bilaterally.  I have reviewed her CT of the abdomen which she had in the emergency room.  No abnormal findings in the abdomen.  Patient states that she continues to have menstrual cycles.  She does have a history of ovarian cysts; however, there is no cyst noted on the CT of the abdomen and pelvis.  She also had a urine  checked when she was in the emergency room which did show that she had some hematuria.  Patient denies seeing any blood in her urine.  She denies any dysuria or painful intercourse.      Family History   Adopted: Yes       Social History     Socioeconomic History   • Marital status:      Spouse name: Not on file   • Number of children: Not on file   • Years of education: Not on file   • Highest education level: Not on file   Tobacco Use   • Smoking status: Current Every Day Smoker     Packs/day: 0.50     Types: Cigarettes   • Smokeless tobacco: Never Used   Substance and Sexual Activity   • Alcohol use: No     Frequency: Never   • Drug use: Yes     Types: IV, Methamphetamines     Comment: quit 19 months ago   • Sexual activity: Defer       Past Medical History:   Diagnosis Date   • Anxiety    • Depression    • Hepatitis C    • History of drug abuse (CMS/HCC)    • Seizures (CMS/HCC)        Review of Systems   Constitutional: Negative.    HENT: Negative.    Respiratory: Negative.    Cardiovascular: Negative.    Gastrointestinal: Positive for abdominal pain, diarrhea and vomiting.   Musculoskeletal: Negative.    Skin: Negative.    Neurological: Negative.    Psychiatric/Behavioral: Negative.  Negative for suicidal ideas.        Depressed mood       Objective   Physical Exam   Constitutional: She is oriented to person, place, and time. She appears well-developed and well-nourished.   Eyes: Conjunctivae are normal.   Neck: Normal range of motion. Neck supple.   Cardiovascular: Normal rate, regular rhythm and normal heart sounds.   Pulmonary/Chest: Effort normal and breath sounds normal.   Abdominal: Soft. She exhibits no distension. There is no tenderness.   Musculoskeletal: Normal range of motion.   Neurological: She is alert and oriented to person, place, and time.   Skin: Skin is warm and dry.   Psychiatric: She has a normal mood and affect. Her behavior is normal. Judgment and thought content normal.   Nursing  "note and vitals reviewed.      Procedures    Vitals: Blood pressure 102/80, pulse 91, temperature 98.6 °F (37 °C), temperature source Oral, height 170.2 cm (67\"), weight 66.7 kg (147 lb), last menstrual period 11/14/2019, SpO2 98 %, not currently breastfeeding.    Allergies:   Allergies   Allergen Reactions   • Contrast Dye Anaphylaxis        During this visit the following were done:  Labs Reviewed []    Labs Ordered []    Radiology Reports Reviewed []    Radiology Ordered []    PCP Records Reviewed []    Referring Provider Records Reviewed []    ER Records Reviewed []    Hospital Records Reviewed []    History Obtained From Family []    Radiology Images Reviewed []    Other Reviewed []    Records Requested []      Assessment/Plan   Meghan was seen today for follow-up.    Diagnoses and all orders for this visit:    Dark stools    Interstitial pneumonia (CMS/HCC)  -     azithromycin (ZITHROMAX Z-JUDI) 250 MG tablet; Take 2 tablets the first day, then 1 tablet daily for 4 days.    Dysuria  -     POCT urinalysis dipstick, automated  -     Urine Culture - Urine, Urine, Clean Catch    Abnormal urinalysis  -     POCT urinalysis dipstick, automated  -     Urine Culture - Urine, Urine, Clean Catch    Other microscopic hematuria  -     Ambulatory Referral to Urology    Bipolar disorder, current episode mixed, moderate (CMS/HCC)  -     risperiDONE (RISPERDAL) 1 MG tablet; Take 1 tablet by mouth Every Night.    Will change to zithromax from doxycycline since she cannot tolerate the side effects.   We will also refer to urology for hematuria at this time.  We will start her on Risperdal in place of the Abilify.  I have discussed this with Dr. Li today.  Stool cards x3 given to patient today.  Patient to return to the office in 1 week for reevaluation           "

## 2019-12-13 LAB
BACTERIA SPEC AEROBE CULT: NO GROWTH
BACTERIA SPEC AEROBE CULT: NORMAL
BACTERIA SPEC AEROBE CULT: NORMAL

## 2020-02-24 DIAGNOSIS — F31.62 BIPOLAR DISORDER, CURRENT EPISODE MIXED, MODERATE (HCC): ICD-10-CM

## 2020-02-24 NOTE — TELEPHONE ENCOUNTER
called for a refill on her Risperdol,pended.      Not available at this time.    Still not available.      Letter mailed to schedule an appointment prior to next due refill.

## 2020-02-25 RX ORDER — RISPERIDONE 1 MG/1
1 TABLET ORAL NIGHTLY
Qty: 30 TABLET | Refills: 0 | Status: SHIPPED | OUTPATIENT
Start: 2020-02-25 | End: 2020-03-19 | Stop reason: SDUPTHER

## 2020-03-19 ENCOUNTER — OFFICE VISIT (OUTPATIENT)
Dept: FAMILY MEDICINE CLINIC | Facility: CLINIC | Age: 45
End: 2020-03-19

## 2020-03-19 VITALS
WEIGHT: 169 LBS | SYSTOLIC BLOOD PRESSURE: 120 MMHG | HEART RATE: 95 BPM | HEIGHT: 67 IN | OXYGEN SATURATION: 98 % | DIASTOLIC BLOOD PRESSURE: 82 MMHG | BODY MASS INDEX: 26.53 KG/M2 | TEMPERATURE: 98.3 F

## 2020-03-19 DIAGNOSIS — J45.30 MILD PERSISTENT ASTHMA WITHOUT COMPLICATION: ICD-10-CM

## 2020-03-19 DIAGNOSIS — F31.62 BIPOLAR DISORDER, CURRENT EPISODE MIXED, MODERATE (HCC): ICD-10-CM

## 2020-03-19 DIAGNOSIS — K21.9 GASTROESOPHAGEAL REFLUX DISEASE, ESOPHAGITIS PRESENCE NOT SPECIFIED: ICD-10-CM

## 2020-03-19 DIAGNOSIS — J45.41 MODERATE PERSISTENT ASTHMA WITH EXACERBATION: Primary | ICD-10-CM

## 2020-03-19 PROCEDURE — 99214 OFFICE O/P EST MOD 30 MIN: CPT | Performed by: NURSE PRACTITIONER

## 2020-03-19 RX ORDER — OMEPRAZOLE 40 MG/1
40 CAPSULE, DELAYED RELEASE ORAL DAILY
Qty: 30 CAPSULE | Refills: 2 | Status: SHIPPED | OUTPATIENT
Start: 2020-03-19 | End: 2022-07-18 | Stop reason: SDUPTHER

## 2020-03-19 RX ORDER — IPRATROPIUM BROMIDE AND ALBUTEROL SULFATE 2.5; .5 MG/3ML; MG/3ML
3 SOLUTION RESPIRATORY (INHALATION) EVERY 4 HOURS PRN
Qty: 360 ML | Refills: 2 | Status: SHIPPED | OUTPATIENT
Start: 2020-03-19 | End: 2020-06-01

## 2020-03-19 RX ORDER — ALBUTEROL SULFATE 90 UG/1
2 AEROSOL, METERED RESPIRATORY (INHALATION) EVERY 6 HOURS PRN
Qty: 6.7 G | Refills: 2 | Status: SHIPPED | OUTPATIENT
Start: 2020-03-19 | End: 2020-10-07 | Stop reason: SDUPTHER

## 2020-03-19 RX ORDER — AZITHROMYCIN 250 MG/1
TABLET, FILM COATED ORAL
Qty: 6 TABLET | Refills: 0 | Status: SHIPPED | OUTPATIENT
Start: 2020-03-19 | End: 2020-06-01

## 2020-03-19 RX ORDER — RISPERIDONE 2 MG/1
2 TABLET ORAL NIGHTLY
Qty: 30 TABLET | Refills: 2 | Status: SHIPPED | OUTPATIENT
Start: 2020-03-19 | End: 2020-06-01 | Stop reason: SDUPTHER

## 2020-03-19 NOTE — PROGRESS NOTES
Subjective   Meghan Rivas is a 44 y.o. female.     Chief Complaint: Follow-up and Depression    Heartburn   She complains of coughing, heartburn and wheezing. This is a chronic problem. The current episode started more than 1 year ago. The problem occurs frequently. The problem has been waxing and waning. The heartburn does not wake her from sleep. The heartburn does not limit her activity. The heartburn doesn't change with position. The symptoms are aggravated by certain foods. She has tried a PPI for the symptoms. The treatment provided significant relief.   Asthma   She complains of cough and wheezing. This is a chronic problem. The current episode started in the past 7 days. The problem occurs daily. The problem has been waxing and waning. The cough is productive of sputum. Associated symptoms include heartburn. Her symptoms are aggravated by exposure to smoke. Risk factors for lung disease include smoking/tobacco exposure. Her past medical history is significant for asthma.   Anxiety   Presents for initial visit. Onset was more than 5 years ago. The problem has been gradually worsening. Symptoms include decreased concentration, depressed mood, dizziness, insomnia, irritability, malaise, nervous/anxious behavior, panic and restlessness. Patient reports no suicidal ideas. 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 Symptoms occur constantly. The severity of symptoms is causing significant distress. The quality of sleep is fair. Nighttime awakenings: one to two. Risk factors: hx of drug abuse. Her past medical history is significant for anxiety/panic attacks and depression. Past treatments include SSRIs. Compliance with prior treatments has been variable.   Depression   Visit Type: initial  Onset of symptoms: more than 5 years ago  Progression since onset: gradually worsening  Patient presents with the following symptoms: decreased concentration, depressed  mood, dizziness, fatigue, insomnia, irritability, malaise, nervousness/anxiety, panic, restlessness and weight loss.  Patient is not experiencing: suicidal ideas and suicidal planning.  Frequency of symptoms: constantly   Severity: causing significant distress   Risk factors: illicit drug use  Patient has a history of: anxiety/panic attacks, depression and mental illness  Treatment tried: non-SSRI antidepressants and SSRI  Compliance with treatment: variable  Improvement on treatment: mild  Pt has been taking risperidone and tolerating well.  She states that her symptoms are controlled at this time.  However, she has been taking 2mg instead of 1mg.  She denies any side effect from the medication.     Family History   Adopted: Yes       Social History     Socioeconomic History   • Marital status:      Spouse name: Not on file   • Number of children: Not on file   • Years of education: Not on file   • Highest education level: Not on file   Tobacco Use   • Smoking status: Current Every Day Smoker     Packs/day: 0.50     Types: Cigarettes   • Smokeless tobacco: Never Used   Substance and Sexual Activity   • Alcohol use: No     Frequency: Never   • Drug use: Yes     Types: IV, Methamphetamines     Comment: quit 19 months ago   • Sexual activity: Defer       Past Medical History:   Diagnosis Date   • Anxiety    • Depression    • Hepatitis C    • History of drug abuse (CMS/HCC)    • Seizures (CMS/HCC)        Review of Systems   Constitutional: Negative.    HENT: Negative.    Respiratory: Positive for cough and wheezing.    Cardiovascular: Negative.    Gastrointestinal: Positive for heartburn.   Musculoskeletal: Negative.    Skin: Negative.    Neurological: Negative.    Psychiatric/Behavioral: Negative.        Objective   Physical Exam   Constitutional: She is oriented to person, place, and time. She appears well-developed and well-nourished.   Neck: Normal range of motion. Neck supple.   Cardiovascular: Normal rate,  "regular rhythm and normal heart sounds.   Pulmonary/Chest: Effort normal. She has wheezes.   Neurological: She is alert and oriented to person, place, and time.   Skin: Skin is warm and dry.   Psychiatric: She has a normal mood and affect. Her behavior is normal. Judgment and thought content normal.   Nursing note and vitals reviewed.      Procedures    Vitals: Blood pressure 120/82, pulse 95, temperature 98.3 °F (36.8 °C), temperature source Oral, height 170.2 cm (67\"), weight 76.7 kg (169 lb), SpO2 98 %, not currently breastfeeding.    Body mass index is 26.47 kg/m².     Allergies:   Allergies   Allergen Reactions   • Contrast Dye Anaphylaxis        During this visit the following were done:  Labs Reviewed []    Labs Ordered []    Radiology Reports Reviewed []    Radiology Ordered []    PCP Records Reviewed []    Referring Provider Records Reviewed []    ER Records Reviewed []    Hospital Records Reviewed []    History Obtained From Family []    Radiology Images Reviewed []    Other Reviewed []    Records Requested []      Assessment/Plan   Meghan was seen today for follow-up and depression.    Diagnoses and all orders for this visit:    Moderate persistent asthma with exacerbation  -     ipratropium-albuterol (DUO-NEB) 0.5-2.5 mg/3 ml nebulizer; Take 3 mL by nebulization Every 4 (Four) Hours As Needed for Wheezing.  -     albuterol sulfate  (90 Base) MCG/ACT inhaler; Inhale 2 puffs Every 6 (Six) Hours As Needed for Wheezing.   -     azithromycin (Zithromax Z-Matt) 250 MG tablet; Take 2 tablets the first day, then 1 tablet daily for 4 days.    Bipolar disorder, current episode mixed, moderate (CMS/HCC)  -     risperiDONE (risperDAL) 2 MG tablet; Take 1 tablet by mouth Every Night.    Gastroesophageal reflux disease, esophagitis presence not specified  -     omeprazole (PrilOSEC) 40 MG capsule; Take 1 capsule by mouth Daily.      -             "

## 2020-06-01 ENCOUNTER — TELEPHONE (OUTPATIENT)
Dept: FAMILY MEDICINE CLINIC | Facility: CLINIC | Age: 45
End: 2020-06-01

## 2020-06-01 ENCOUNTER — OFFICE VISIT (OUTPATIENT)
Dept: FAMILY MEDICINE CLINIC | Facility: CLINIC | Age: 45
End: 2020-06-01

## 2020-06-01 VITALS
TEMPERATURE: 98.2 F | HEART RATE: 87 BPM | OXYGEN SATURATION: 98 % | HEIGHT: 67 IN | DIASTOLIC BLOOD PRESSURE: 72 MMHG | WEIGHT: 179.4 LBS | BODY MASS INDEX: 28.16 KG/M2 | SYSTOLIC BLOOD PRESSURE: 116 MMHG

## 2020-06-01 DIAGNOSIS — F31.62 BIPOLAR DISORDER, CURRENT EPISODE MIXED, MODERATE (HCC): ICD-10-CM

## 2020-06-01 DIAGNOSIS — B18.2 HEP C W/O COMA, CHRONIC (HCC): ICD-10-CM

## 2020-06-01 DIAGNOSIS — J84.9 INTERSTITIAL PNEUMONIA (HCC): Primary | ICD-10-CM

## 2020-06-01 DIAGNOSIS — R06.02 SHORTNESS OF BREATH: ICD-10-CM

## 2020-06-01 LAB — M PNEUMO IGM SER QL: POSITIVE

## 2020-06-01 PROCEDURE — 87522 HEPATITIS C REVRS TRNSCRPJ: CPT | Performed by: FAMILY MEDICINE

## 2020-06-01 PROCEDURE — 85025 COMPLETE CBC W/AUTO DIFF WBC: CPT | Performed by: FAMILY MEDICINE

## 2020-06-01 PROCEDURE — 99214 OFFICE O/P EST MOD 30 MIN: CPT | Performed by: FAMILY MEDICINE

## 2020-06-01 PROCEDURE — 80074 ACUTE HEPATITIS PANEL: CPT | Performed by: FAMILY MEDICINE

## 2020-06-01 PROCEDURE — 86738 MYCOPLASMA ANTIBODY: CPT | Performed by: FAMILY MEDICINE

## 2020-06-01 PROCEDURE — 80053 COMPREHEN METABOLIC PANEL: CPT | Performed by: FAMILY MEDICINE

## 2020-06-01 PROCEDURE — 96372 THER/PROPH/DIAG INJ SC/IM: CPT | Performed by: FAMILY MEDICINE

## 2020-06-01 RX ORDER — AMOXICILLIN AND CLAVULANATE POTASSIUM 875; 125 MG/1; MG/1
1 TABLET, FILM COATED ORAL 2 TIMES DAILY
Qty: 14 TABLET | Refills: 0 | Status: SHIPPED | OUTPATIENT
Start: 2020-06-01 | End: 2020-07-27

## 2020-06-01 RX ORDER — PREDNISONE 10 MG/1
TABLET ORAL
Qty: 30 TABLET | Refills: 0 | Status: SHIPPED | OUTPATIENT
Start: 2020-06-01 | End: 2020-07-27

## 2020-06-01 RX ORDER — DOXYCYCLINE 100 MG/1
100 CAPSULE ORAL 2 TIMES DAILY
Qty: 20 CAPSULE | Refills: 0 | Status: SHIPPED | OUTPATIENT
Start: 2020-06-01 | End: 2020-07-27

## 2020-06-01 RX ORDER — DEXAMETHASONE SODIUM PHOSPHATE 4 MG/ML
8 INJECTION, SOLUTION INTRA-ARTICULAR; INTRALESIONAL; INTRAMUSCULAR; INTRAVENOUS; SOFT TISSUE ONCE
Status: COMPLETED | OUTPATIENT
Start: 2020-06-01 | End: 2020-06-01

## 2020-06-01 RX ORDER — AZITHROMYCIN 250 MG/1
TABLET, FILM COATED ORAL
Qty: 6 TABLET | Refills: 0 | Status: SHIPPED | OUTPATIENT
Start: 2020-06-01 | End: 2020-07-27

## 2020-06-01 RX ORDER — RISPERIDONE 2 MG/1
2 TABLET ORAL NIGHTLY
Qty: 90 TABLET | Refills: 1 | Status: SHIPPED | OUTPATIENT
Start: 2020-06-01 | End: 2020-09-09 | Stop reason: SDUPTHER

## 2020-06-01 RX ADMIN — DEXAMETHASONE SODIUM PHOSPHATE 8 MG: 4 INJECTION, SOLUTION INTRA-ARTICULAR; INTRALESIONAL; INTRAMUSCULAR; INTRAVENOUS; SOFT TISSUE at 11:51

## 2020-06-01 NOTE — PATIENT INSTRUCTIONS
Try the Breo sample - 1 puff twice a day.  Get the chest xray done.  Two antibiotics and prednisone pack.    We will call you to schedule the CT chest.     Call me and let me know where you want that nebulizer machine script sent.

## 2020-06-01 NOTE — TELEPHONE ENCOUNTER
----- Message from Salome Li MD sent at 6/1/2020  3:11 PM EDT -----  Please call Meghan and let her know that she was positive for Mycoplasma, which is a kind of pneumonia. I had given her augmentin and a zpack at her visit today. Please let her know to complete the zpack, but to stop the augmentin. Instead, I have sent in a course of doxycycline. She should take that instead with the zpack. That should stop the Mycoplasma. Thanks.

## 2020-06-02 LAB
ALBUMIN SERPL-MCNC: 4.3 G/DL (ref 3.5–5.2)
ALBUMIN/GLOB SERPL: 1.1 G/DL
ALP SERPL-CCNC: 39 U/L (ref 39–117)
ALT SERPL W P-5'-P-CCNC: <5 U/L (ref 1–33)
ANION GAP SERPL CALCULATED.3IONS-SCNC: 9.1 MMOL/L (ref 5–15)
AST SERPL-CCNC: 21 U/L (ref 1–32)
BASOPHILS # BLD AUTO: 0.04 10*3/MM3 (ref 0–0.2)
BASOPHILS NFR BLD AUTO: 0.9 % (ref 0–1.5)
BILIRUB SERPL-MCNC: 0.4 MG/DL (ref 0.2–1.2)
BUN BLD-MCNC: 11 MG/DL (ref 6–20)
BUN/CREAT SERPL: 16.4 (ref 7–25)
CALCIUM SPEC-SCNC: 9.8 MG/DL (ref 8.6–10.5)
CHLORIDE SERPL-SCNC: 98 MMOL/L (ref 98–107)
CO2 SERPL-SCNC: 20.9 MMOL/L (ref 22–29)
CREAT BLD-MCNC: 0.67 MG/DL (ref 0.57–1)
DEPRECATED RDW RBC AUTO: 42.5 FL (ref 37–54)
EOSINOPHIL # BLD AUTO: 0.31 10*3/MM3 (ref 0–0.4)
EOSINOPHIL NFR BLD AUTO: 6.7 % (ref 0.3–6.2)
ERYTHROCYTE [DISTWIDTH] IN BLOOD BY AUTOMATED COUNT: 13.7 % (ref 12.3–15.4)
GFR SERPL CREATININE-BSD FRML MDRD: 96 ML/MIN/1.73
GLOBULIN UR ELPH-MCNC: 3.9 GM/DL
GLUCOSE BLD-MCNC: 81 MG/DL (ref 65–99)
HAV IGM SERPL QL IA: ABNORMAL
HBV CORE IGM SERPL QL IA: ABNORMAL
HBV SURFACE AG SERPL QL IA: ABNORMAL
HCT VFR BLD AUTO: 41.9 % (ref 34–46.6)
HCV AB SER DONR QL: REACTIVE
HGB BLD-MCNC: 14.2 G/DL (ref 12–15.9)
HOLD SPECIMEN: NORMAL
IMM GRANULOCYTES # BLD AUTO: 0.01 10*3/MM3 (ref 0–0.05)
IMM GRANULOCYTES NFR BLD AUTO: 0.2 % (ref 0–0.5)
LYMPHOCYTES # BLD AUTO: 2.04 10*3/MM3 (ref 0.7–3.1)
LYMPHOCYTES NFR BLD AUTO: 44.1 % (ref 19.6–45.3)
MCH RBC QN AUTO: 28.8 PG (ref 26.6–33)
MCHC RBC AUTO-ENTMCNC: 33.9 G/DL (ref 31.5–35.7)
MCV RBC AUTO: 85 FL (ref 79–97)
MONOCYTES # BLD AUTO: 0.43 10*3/MM3 (ref 0.1–0.9)
MONOCYTES NFR BLD AUTO: 9.3 % (ref 5–12)
NEUTROPHILS # BLD AUTO: 1.8 10*3/MM3 (ref 1.7–7)
NEUTROPHILS NFR BLD AUTO: 38.8 % (ref 42.7–76)
NRBC BLD AUTO-RTO: 0 /100 WBC (ref 0–0.2)
PLATELET # BLD AUTO: 206 10*3/MM3 (ref 140–450)
PMV BLD AUTO: 11.4 FL (ref 6–12)
POTASSIUM BLD-SCNC: 4.1 MMOL/L (ref 3.5–5.2)
PROT SERPL-MCNC: 8.2 G/DL (ref 6–8.5)
RBC # BLD AUTO: 4.93 10*6/MM3 (ref 3.77–5.28)
SODIUM BLD-SCNC: 128 MMOL/L (ref 136–145)
WBC NRBC COR # BLD: 4.63 10*3/MM3 (ref 3.4–10.8)

## 2020-06-04 NOTE — TELEPHONE ENCOUNTER
Pts  was seen in office by Dr. Li. Pts  was given written instructions written and given to him by Dr. Li to give to the pt.

## 2020-06-04 NOTE — TELEPHONE ENCOUNTER
Spoke with pt and advised. Pt verbalized understanding and stated she has been following directions per Dr. Preciado note that was given to her by her .

## 2020-06-22 NOTE — PROGRESS NOTES
"Meghan Rivas     VITALS: Blood pressure 116/72, pulse 87, temperature 98.2 °F (36.8 °C), height 170.2 cm (67.01\"), weight 81.4 kg (179 lb 6.4 oz), SpO2 98 %, not currently breastfeeding.    Subjective  Chief Complaint:   Chief Complaint   Patient presents with   • Dizziness   • Shortness of Breath        History of Present Illness:  Patient is a 44 y.o.  female with a medical history significant for hepatitis C who presents to clinic secondary to medical followup.  She is not feeling well today.  Patient is having a 2 and a 1/2-month history of shortness of breath and nonproductive coughing.  She denies any fevers, but admits to chills.  She denies any ear pain, congestion, or rhinorrhea.  No one else at home is sick.  She has had intermittent coughing for the last 6 months.  She has been treated with several antibiotics for pneumonia in the past over the last 6 months.  She says that they help.  She says that her symptoms get better but then they worsen again.  She has not tried anything over-the-counter.    Patient has hepatitis C and would like to seek treatment.  She does have a drug history but states that she is clean.    Patient has bipolar disorder and is currently on Risperdal 2 mg orally daily without any side effects.  She states that this is helping her.  Sleep and appetite are improved.  She is able to do daily activities.  Some anhedonia.  She occasionally finds it hard to make goals.  She has decreased memory and concentration.  Some crying.  Some anger and frustration.  She denies any auditory or visual hallucinations.  She denies any suicidal or homicidal ideations.    No complaints about any of the medications.    The following portions of the patient's history were reviewed and updated as appropriate: allergies, current medications, past family history, past medical history, past social history, past surgical history and problem list.    Past Medical History  Past Medical History:   Diagnosis " Date   • Anxiety    • Depression    • Hepatitis C    • History of drug abuse (CMS/HCC)    • Seizures (CMS/HCC)        Review of Systems   Respiratory: Positive for cough, shortness of breath and wheezing.    Cardiovascular: Negative for chest pain and palpitations.       Surgical History  Past Surgical History:   Procedure Laterality Date   • OVARIAN CYST SURGERY      x7 surgeries       Family History  Family History   Adopted: Yes       Social History  Social History     Socioeconomic History   • Marital status:      Spouse name: Not on file   • Number of children: Not on file   • Years of education: Not on file   • Highest education level: Not on file   Tobacco Use   • Smoking status: Current Every Day Smoker     Packs/day: 0.50     Types: Cigarettes   • Smokeless tobacco: Never Used   Substance and Sexual Activity   • Alcohol use: No     Frequency: Never   • Drug use: Yes     Types: IV, Methamphetamines     Comment: quit 19 months ago   • Sexual activity: Defer       Objective  Physical Exam    Gen: Patient in NAD. Pleasant and answers appropriately. A&Ox3.    Skin: Warm and dry with normal turgor. No purpura, rashes, or unusual pigmentation noted. Hair is normal in appearance and distribution.    HEENT: NC/AT. No lesions noted. Conjunctiva clear, sclera nonicteric. PERRL. EOMI without nystagmus or strabismus. Fundi appear benign. No hemorrhages or exudates of eyes. Auditory canals are patent bilaterally without lesions. TMs intact,  nonerythematous, nonbulging without lesions. Nasal mucosa erythematous, and nonedematous. Frontal and maxillary sinuses are nontender. O/P erythematous and moist without exudate.    Neck: Supple without lymph nodes palpated. FROM.     Lungs: Coarse and decreased B/L with rales and rhonchi diffusely over bilateral lower lung fields.  No crackles.  Positive diffuse expiratory wheezes in all lung fields.    Heart: RRR. S1 and S2 normal. No S3 or S4. No MRGT.    Abd: Soft,  nontender,nondistended. (+)BSx4 quadrants.     Extrem: No CCE. Radial pulses 2+/4 and equal B/L. FROMx4. No bone, joint, or muscle tenderness noted.    Neuro: No focal motor/sensory deficits.    Procedures    Assessment/Plan  Meghan Rivas is a 44 y.o. here for medical followup.  Diagnoses and all orders for this visit:    Interstitial pneumonia (CMS/HCC)  -     XR Chest 2 View; Future  -     CT Chest Without Contrast; Future  -     CBC & Differential; Future  -     Comprehensive Metabolic Panel; Future  -     Pulmonary Function Test; Future  -     predniSONE (DELTASONE) 10 MG tablet; Take 40 mg orally daily x 4 days, then 30 mg daily x 3 days, then 20 mg daily x 2 days, then 10 mg daily x 1 day.  -     azithromycin (Zithromax Z-Matt) 250 MG tablet; Take 2 tablets the first day, then 1 tablet daily for 4 days.  -     amoxicillin-clavulanate (AUGMENTIN) 875-125 MG per tablet; Take 1 tablet by mouth 2 (Two) Times a Day.  -     Mycoplasma Pneumoniae Antibody, IgM - Blood, Arm, Left; Future  -     dexamethasone (DECADRON) injection 8 mg  -     CBC & Differential  -     Comprehensive Metabolic Panel  -     Mycoplasma Pneumoniae Antibody, IgM - Blood, Arm, Left  -     CBC Auto Differential  -     doxycycline (MONODOX) 100 MG capsule; Take 1 capsule by mouth 2 (Two) Times a Day.  Supportive care indicated, including increased fluids and rest. Patient to monitor. Patient to call if symptoms continue or worsen.     Bipolar disorder, current episode mixed, moderate (CMS/HCC)  -     risperiDONE (risperDAL) 2 MG tablet; Take 1 tablet by mouth Every Night.  -     CBC & Differential; Future  -     Comprehensive Metabolic Panel; Future  -     CBC & Differential  -     Comprehensive Metabolic Panel  -     CBC Auto Differential  Continue Risperdal 2 mg orally at night.    Shortness of breath  -     XR Chest 2 View; Future  -     CT Chest Without Contrast; Future  -     CBC & Differential; Future  -     Comprehensive Metabolic Panel;  Future  -     Pulmonary Function Test; Future  -     predniSONE (DELTASONE) 10 MG tablet; Take 40 mg orally daily x 4 days, then 30 mg daily x 3 days, then 20 mg daily x 2 days, then 10 mg daily x 1 day.  -     azithromycin (Zithromax Z-Matt) 250 MG tablet; Take 2 tablets the first day, then 1 tablet daily for 4 days.  -     amoxicillin-clavulanate (AUGMENTIN) 875-125 MG per tablet; Take 1 tablet by mouth 2 (Two) Times a Day.  -     Mycoplasma Pneumoniae Antibody, IgM - Blood, Arm, Left; Future  -     dexamethasone (DECADRON) injection 8 mg  -     CBC & Differential  -     Comprehensive Metabolic Panel  -     Mycoplasma Pneumoniae Antibody, IgM - Blood, Arm, Left  -     CBC Auto Differential    Hep C w/o coma, chronic (CMS/HCC)  -     Hepatitis C RNA, Quantitative, PCR (graph); Future  -     Hepatitis Panel, Acute; Future  -     Hepatitis C RNA, Quantitative, PCR (graph)  -     Hepatitis Panel, Acute  -     Hepatitis Panel, Acute  -     Hep B Confirmation Tube  Will check hepatitis C levels.    Patient's Body mass index is 28.09 kg/m². BMI is above normal parameters. Recommendations include: exercise counseling and nutrition counseling.       Meghan Rivas  reports that she has been smoking cigarettes. She has been smoking about 0.50 packs per day. She has never used smokeless tobacco.. I have educated her on the risk of diseases from using tobacco products such as cancer, COPD and heart diease.     I advised her to quit and she is not willing to quit.    I spent 3  minutes counseling the patient.          Findings and plans discussed with patient who verbalizes understanding and agreement. Will followup with patient once results are in. Patient to followup at clinic PRN or in two weeks for further medical followup.    Salome Li MD    EMR Dragon/Transcription Disclaimer:  Much of this encounter note is an electronic transcription/translation of spoken language to printed text.  The electronic translation of  spoken language may permit erroneous, or at times, nonsensical words or phrases to be inadvertently transcribed.  Although I have reviewed the note for such errors, some may still exist.

## 2020-06-23 ENCOUNTER — LAB (OUTPATIENT)
Dept: LAB | Facility: HOSPITAL | Age: 45
End: 2020-06-23

## 2020-06-23 ENCOUNTER — TRANSCRIBE ORDERS (OUTPATIENT)
Dept: ADMINISTRATIVE | Facility: HOSPITAL | Age: 45
End: 2020-06-23

## 2020-06-23 DIAGNOSIS — Z11.59 ENCOUNTER FOR SCREENING FOR OTHER VIRAL DISEASES: Primary | ICD-10-CM

## 2020-06-23 DIAGNOSIS — Z11.59 ENCOUNTER FOR SCREENING FOR OTHER VIRAL DISEASES: ICD-10-CM

## 2020-06-23 PROCEDURE — U0004 COV-19 TEST NON-CDC HGH THRU: HCPCS

## 2020-06-23 PROCEDURE — U0002 COVID-19 LAB TEST NON-CDC: HCPCS

## 2020-06-23 PROCEDURE — C9803 HOPD COVID-19 SPEC COLLECT: HCPCS

## 2020-06-24 LAB
REF LAB TEST METHOD: NORMAL
SARS-COV-2 RNA RESP QL NAA+PROBE: NOT DETECTED

## 2020-06-25 ENCOUNTER — HOSPITAL ENCOUNTER (OUTPATIENT)
Dept: CT IMAGING | Facility: HOSPITAL | Age: 45
Discharge: HOME | End: 2020-06-25

## 2020-06-25 ENCOUNTER — HOSPITAL ENCOUNTER (OUTPATIENT)
Dept: RESPIRATORY THERAPY | Facility: HOSPITAL | Age: 45
Discharge: HOME | End: 2020-06-25
Admitting: FAMILY MEDICINE

## 2020-06-25 VITALS — HEART RATE: 88 BPM | OXYGEN SATURATION: 99 % | RESPIRATION RATE: 15 BRPM

## 2020-06-25 DIAGNOSIS — R06.02 SHORTNESS OF BREATH: ICD-10-CM

## 2020-06-25 DIAGNOSIS — J84.9 INTERSTITIAL PNEUMONIA (HCC): ICD-10-CM

## 2020-06-25 PROCEDURE — 94727 GAS DIL/WSHOT DETER LNG VOL: CPT | Performed by: INTERNAL MEDICINE

## 2020-06-25 PROCEDURE — 71250 CT THORAX DX C-: CPT

## 2020-06-25 PROCEDURE — 94727 GAS DIL/WSHOT DETER LNG VOL: CPT

## 2020-06-25 PROCEDURE — 94729 DIFFUSING CAPACITY: CPT

## 2020-06-25 PROCEDURE — 71250 CT THORAX DX C-: CPT | Performed by: RADIOLOGY

## 2020-06-25 PROCEDURE — 94640 AIRWAY INHALATION TREATMENT: CPT

## 2020-06-25 PROCEDURE — 94060 EVALUATION OF WHEEZING: CPT | Performed by: INTERNAL MEDICINE

## 2020-06-25 PROCEDURE — 94799 UNLISTED PULMONARY SVC/PX: CPT

## 2020-06-25 PROCEDURE — 94060 EVALUATION OF WHEEZING: CPT

## 2020-06-25 PROCEDURE — 94729 DIFFUSING CAPACITY: CPT | Performed by: INTERNAL MEDICINE

## 2020-06-25 RX ORDER — ALBUTEROL SULFATE 2.5 MG/3ML
2.5 SOLUTION RESPIRATORY (INHALATION) ONCE
Status: COMPLETED | OUTPATIENT
Start: 2020-06-25 | End: 2020-06-25

## 2020-06-25 RX ADMIN — ALBUTEROL SULFATE 2.5 MG: 2.5 SOLUTION RESPIRATORY (INHALATION) at 10:25

## 2020-07-08 ENCOUNTER — OFFICE VISIT (OUTPATIENT)
Dept: FAMILY MEDICINE CLINIC | Facility: CLINIC | Age: 45
End: 2020-07-08

## 2020-07-08 VITALS
SYSTOLIC BLOOD PRESSURE: 102 MMHG | OXYGEN SATURATION: 98 % | BODY MASS INDEX: 28.35 KG/M2 | DIASTOLIC BLOOD PRESSURE: 60 MMHG | HEIGHT: 67 IN | RESPIRATION RATE: 12 BRPM | WEIGHT: 180.6 LBS | TEMPERATURE: 96.4 F | HEART RATE: 70 BPM

## 2020-07-08 DIAGNOSIS — Z72.0 TOBACCO ABUSE: ICD-10-CM

## 2020-07-08 DIAGNOSIS — R53.83 OTHER FATIGUE: Primary | ICD-10-CM

## 2020-07-08 DIAGNOSIS — M25.50 ARTHRALGIA, UNSPECIFIED JOINT: ICD-10-CM

## 2020-07-08 DIAGNOSIS — B18.2 HEP C W/O COMA, CHRONIC (HCC): ICD-10-CM

## 2020-07-08 PROBLEM — N28.89 RENAL MASS, LEFT: Status: RESOLVED | Noted: 2019-04-09 | Resolved: 2020-07-08

## 2020-07-08 PROCEDURE — 86431 RHEUMATOID FACTOR QUANT: CPT | Performed by: FAMILY MEDICINE

## 2020-07-08 PROCEDURE — 86200 CCP ANTIBODY: CPT | Performed by: FAMILY MEDICINE

## 2020-07-08 PROCEDURE — 99214 OFFICE O/P EST MOD 30 MIN: CPT | Performed by: FAMILY MEDICINE

## 2020-07-08 PROCEDURE — 86038 ANTINUCLEAR ANTIBODIES: CPT | Performed by: FAMILY MEDICINE

## 2020-07-09 LAB — CHROMATIN AB SERPL-ACNC: 12.9 IU/ML (ref 0–14)

## 2020-07-10 LAB
ANA SER QL IA: NEGATIVE
CCP IGA+IGG SERPL IA-ACNC: 5 UNITS (ref 0–19)
Lab: NORMAL

## 2020-07-27 NOTE — PROGRESS NOTES
"Meghan Rivas     VITALS: Blood pressure 102/60, pulse 70, temperature 96.4 °F (35.8 °C), temperature source Temporal, resp. rate 12, height 170.2 cm (67.01\"), weight 81.9 kg (180 lb 9.6 oz), last menstrual period 06/18/2020, SpO2 98 %, not currently breastfeeding.    Subjective  Chief Complaint:   Chief Complaint   Patient presents with   • Pneumonia     f/u from  6/2020. Pt reports no improvements after  abx. Increased fatigue/weakness/SOA w/ excertion. Rapid HR saturday at rest.        History of Present Illness:  Patient is a 44 y.o.  female with a medical history significant for bipolar disorder and hepatitis C who presents to clinic secondary to medical followup.  She most recently was diagnosed with pneumonia and was prescribed antibiotics.  She states that she feels better, but she still continues to report fatigue and occasional coughing.  She reports shortness of breath with exertion.  She states that she just feels wiped out.  She denies any fevers or chills.    Patient has chronic conditions of hepatitis C, arthritis, and tobacco abuse.  These chronic conditions are stable and unchanged.  Medications associated with his chronic conditions are not giving her any side effects.    No complaints about any of the medications.    The following portions of the patient's history were reviewed and updated as appropriate: allergies, current medications, past family history, past medical history, past social history, past surgical history and problem list.    Past Medical History  Past Medical History:   Diagnosis Date   • Anxiety    • Depression    • Hepatitis C    • History of drug abuse (CMS/HCC)    • Seizures (CMS/HCC)        Review of Systems   Respiratory: Positive for shortness of breath. Negative for wheezing.    Cardiovascular: Negative for chest pain and palpitations.       Surgical History  Past Surgical History:   Procedure Laterality Date   • OVARIAN CYST SURGERY      x7 surgeries       Family " History  Family History   Adopted: Yes       Social History  Social History     Socioeconomic History   • Marital status:      Spouse name: Not on file   • Number of children: Not on file   • Years of education: Not on file   • Highest education level: Not on file   Tobacco Use   • Smoking status: Current Every Day Smoker     Packs/day: 0.50     Types: Cigarettes   • Smokeless tobacco: Never Used   Substance and Sexual Activity   • Alcohol use: No     Frequency: Never   • Drug use: Yes     Types: IV, Methamphetamines     Comment: quit 19 months ago   • Sexual activity: Defer       Objective  Physical Exam    Gen: Patient in NAD. Pleasant and answers appropriately. A&Ox3.    Skin: Warm and dry with normal turgor. No purpura, rashes, or unusual pigmentation noted. Hair is normal in appearance and distribution.    HEENT: NC/AT. No lesions noted. Conjunctiva clear, sclera nonicteric. PERRL. EOMI without nystagmus or strabismus. Fundi appear benign. No hemorrhages or exudates of eyes. Auditory canals are patent bilaterally without lesions. TMs intact,  nonerythematous, nonbulging without lesions. Nasal mucosa pink, nonerythematous, and nonedematous. Frontal and maxillary sinuses are nontender. O/P nonerythematous and moist without exudate.    Neck: Supple without lymph nodes palpated. FROM.     Lungs: Coarse and decreased B/L without rales, rhonchi, crackles, or wheezes.    Heart: RRR. S1 and S2 normal. No S3 or S4. No MRGT.    Abd: Soft, nontender,nondistended. (+)BSx4 quadrants.     Extrem: No CCE. Radial pulses 2+/4 and equal B/L. FROMx4.     Neuro: No focal motor/sensory deficits.    Procedures    Assessment/Plan  Meghan Rivas is a 44 y.o. here for medical followup.  Diagnoses and all orders for this visit:    Other fatigue  -     Ambulatory Referral to Pulmonology  -     Ambulatory Referral to Gastroenterology  Will refer to pulmonology for further work-up and possibly perhaps evaluation for sleep  apnea.    Hep C w/o coma, chronic (CMS/HCC)  -     Ambulatory Referral to Gastroenterology  Referral for hepatitis C clinic.    Arthralgia, unspecified joint  -     RADHA by IFA, Reflex 9-biomarkers profile; Future  -     Rheumatoid Factor; Future  -     Cyclic Citrul Peptide Antibody, IgG / IgA; Future  -     RADHA by IFA, Reflex 9-biomarkers profile  -     Rheumatoid Factor  -     Cyclic Citrul Peptide Antibody, IgG / IgA  We will do autoimmune work-up.    Tobacco abuse  -     nicotine polacrilex (NICORETTE) 4 MG gum; Chew 1 each As Needed for Smoking Cessation.      Patient's Body mass index is 28.28 kg/m². BMI is above normal parameters. Recommendations include: exercise counseling and nutrition counseling.       Meghan Rivas  reports that she has been smoking cigarettes. She has been smoking about 0.50 packs per day. She has never used smokeless tobacco.. I have educated her on the risk of diseases from using tobacco products such as cancer, COPD and heart diease.     I advised her to quit and she is willing to quit. We have discussed the following method/s for tobacco cessation:  Prescription Medicaiton.  Together we have set a quit date for 1 month from today.  She will follow up with me in 2 months or sooner to check on her progress.    I spent 3  minutes counseling the patient.          Findings and plans discussed with patient who verbalizes understanding and agreement. Will followup with patient once results are in. Patient to followup at clinic PRN or in two months for further medical followup.    MD NICKY Palafox Dragon/Transcription Disclaimer:  Much of this encounter note is an electronic transcription/translation of spoken language to printed text.  The electronic translation of spoken language may permit erroneous, or at times, nonsensical words or phrases to be inadvertently transcribed.  Although I have reviewed the note for such errors, some may still exist.

## 2020-09-09 ENCOUNTER — OFFICE VISIT (OUTPATIENT)
Dept: FAMILY MEDICINE CLINIC | Facility: CLINIC | Age: 45
End: 2020-09-09

## 2020-09-09 VITALS
BODY MASS INDEX: 27.94 KG/M2 | SYSTOLIC BLOOD PRESSURE: 110 MMHG | HEIGHT: 67 IN | TEMPERATURE: 97 F | OXYGEN SATURATION: 97 % | DIASTOLIC BLOOD PRESSURE: 74 MMHG | HEART RATE: 88 BPM | WEIGHT: 178 LBS

## 2020-09-09 DIAGNOSIS — B18.2 CHRONIC HEPATITIS C WITHOUT HEPATIC COMA (HCC): ICD-10-CM

## 2020-09-09 DIAGNOSIS — F31.62 BIPOLAR DISORDER, CURRENT EPISODE MIXED, MODERATE (HCC): ICD-10-CM

## 2020-09-09 DIAGNOSIS — R06.02 SHORTNESS OF BREATH: Primary | ICD-10-CM

## 2020-09-09 PROCEDURE — 93000 ELECTROCARDIOGRAM COMPLETE: CPT | Performed by: FAMILY MEDICINE

## 2020-09-09 PROCEDURE — 99214 OFFICE O/P EST MOD 30 MIN: CPT | Performed by: FAMILY MEDICINE

## 2020-09-09 RX ORDER — RISPERIDONE 2 MG/1
2 TABLET ORAL NIGHTLY
Qty: 90 TABLET | Refills: 1 | Status: SHIPPED | OUTPATIENT
Start: 2020-09-09 | End: 2021-07-13

## 2020-09-09 NOTE — PATIENT INSTRUCTIONS
Pulm 11/24/2020 appointment  Hepatitis C clinic [phone] - call for appointment.     Try 7 days of the arnuity - one puff a day.  If it's good, can finish the inhaler.    If it's not good, add the incruse. See how it is together.  Can also see how the incruse is by itself.

## 2020-09-28 PROCEDURE — 93000 ELECTROCARDIOGRAM COMPLETE: CPT | Performed by: FAMILY MEDICINE

## 2020-09-28 NOTE — PROGRESS NOTES
"Meghan Rivas     VITALS: Blood pressure 110/74, pulse 88, temperature 97 °F (36.1 °C), height 170.2 cm (67.01\"), weight 80.7 kg (178 lb), SpO2 97 %, not currently breastfeeding.    Subjective  Chief Complaint:   Chief Complaint   Patient presents with   • Fatigue     follow up         History of Present Illness:  Patient is a 44 y.o.  female with a medical history significant for bipolar disorder and hepatitis C who presents to clinic secondary to medical followup.  Patient continues to have fatigue and shortness of breath.  She has occasional coughing.  She has worsening shortness of breath with exertion.  She denies any fevers or chills.  She does continue to smoke.  She denies any chest pain, dizziness, lightheadedness, or edema.  Patient is next scheduled to see pulmonology in November 2020.  She has not scheduled a hepatitis C clinic appointment.  She does complain of a lot of joint pain especially in the morning.  When she gets moving, she feels better.    Patient has chronic conditions including hepatitis C and bipolar disorder.  These medical conditions are stable and unchanged.  Medications associated with these chronic conditions are not giving her any side effects.    No complaints about any of the medications.    The following portions of the patient's history were reviewed and updated as appropriate: allergies, current medications, past family history, past medical history, past social history, past surgical history and problem list.    Past Medical History  Past Medical History:   Diagnosis Date   • Anxiety    • Depression    • Hepatitis C    • History of drug abuse (CMS/HCC)    • Seizures (CMS/HCC)        Review of Systems   Respiratory: Positive for shortness of breath and wheezing.    Cardiovascular: Negative for chest pain and palpitations.       Surgical History  Past Surgical History:   Procedure Laterality Date   • OVARIAN CYST SURGERY      x7 surgeries       Family History  Family History "   Adopted: Yes       Social History  Social History     Socioeconomic History   • Marital status:      Spouse name: Not on file   • Number of children: Not on file   • Years of education: Not on file   • Highest education level: Not on file   Tobacco Use   • Smoking status: Current Every Day Smoker     Packs/day: 0.50     Types: Cigarettes   • Smokeless tobacco: Never Used   Substance and Sexual Activity   • Alcohol use: No     Frequency: Never   • Drug use: Yes     Types: IV, Methamphetamines     Comment: quit 19 months ago   • Sexual activity: Defer       Objective  Physical Exam    Gen: Patient in NAD. Pleasant and answers appropriately. A&Ox3.    Skin: Warm and dry with normal turgor. No purpura, rashes, or unusual pigmentation noted. Hair is normal in appearance and distribution.    HEENT: NC/AT. No lesions noted. Conjunctiva clear, sclera nonicteric. PERRL. EOMI without nystagmus or strabismus. Fundi appear benign. No hemorrhages or exudates of eyes. Auditory canals are patent bilaterally without lesions. TMs intact,  nonerythematous, nonbulging without lesions. Nasal mucosa pink, nonerythematous, and nonedematous. Frontal and maxillary sinuses are nontender. O/P nonerythematous and moist without exudate.    Neck: Supple without lymph nodes palpated. FROM.     Lungs: CTA B/L with rales, but without rhonchi, crackles, or wheezes.    Heart: RRR. S1 and S2 normal. No S3 or S4. No MRGT.    Abd: Soft, nontender,nondistended. (+)BSx4 quadrants.  No HSM.    Extrem: No CCE. Radial pulses 2+/4 and equal B/L. FROMx4.     Neuro: No focal motor/sensory deficits.      ECG 12 Lead    Date/Time: 9/28/2020 9:36 AM  Performed by: Salome Li MD  Authorized by: Salome Li MD   Comparison: not compared with previous ECG   Rhythm: sinus rhythm  Rate: normal  ST Segments: ST segments normal  T Waves: T waves normal  QRS axis: normal  Other findings: low voltage    Clinical impression: non-specific  ECG  Comments: NSR without any ST or T acute changes.  Low QRS voltage in precordial leads.  Possible right ventricular conduction delay.            Assessment/Plan  Meghan Rivas is a 44 y.o. here for medical followup.  Diagnoses and all orders for this visit:    Shortness of breath  -     ECG 12 Lead  Patient given samples of Anoro and Arnuity to try.  Patient to call if samples work.    Bipolar disorder, current episode mixed, moderate (CMS/HCC)  -     risperiDONE (risperDAL) 2 MG tablet; Take 1 tablet by mouth Every Night.  Refilled Risperdal 2 mg orally at night.  Doing well.    Chronic hepatitis C without hepatic coma (CMS/HCC)  Patient is going to call hepatitis C clinic for an appointment.  They have tried to reach her without success previously.  Phone number given to patient.    Patient's Body mass index is 27.87 kg/m². BMI is above normal parameters. Recommendations include: exercise counseling and nutrition counseling.       Meghan Rivas  reports that she has been smoking cigarettes. She has been smoking about 0.50 packs per day. She has never used smokeless tobacco.. I have educated her on the risk of diseases from using tobacco products such as cancer, COPD and heart diease.     I advised her to quit and she is not willing to quit.    I spent 3  minutes counseling the patient.          Findings and plans discussed with patient who verbalizes understanding and agreement. Will followup with patient once results are in. Patient to followup at clinic PRN or in six weeks for further medical followup.    Salome Li MD    EMR Dragon/Transcription Disclaimer:  Much of this encounter note is an electronic transcription/translation of spoken language to printed text.  The electronic translation of spoken language may permit erroneous, or at times, nonsensical words or phrases to be inadvertently transcribed.  Although I have reviewed the note for such errors, some may still exist.

## 2020-09-30 ENCOUNTER — OFFICE VISIT (OUTPATIENT)
Dept: FAMILY MEDICINE CLINIC | Facility: CLINIC | Age: 45
End: 2020-09-30

## 2020-09-30 VITALS
BODY MASS INDEX: 27.94 KG/M2 | OXYGEN SATURATION: 98 % | HEIGHT: 67 IN | DIASTOLIC BLOOD PRESSURE: 78 MMHG | WEIGHT: 178 LBS | SYSTOLIC BLOOD PRESSURE: 120 MMHG | TEMPERATURE: 98 F | HEART RATE: 94 BPM

## 2020-09-30 DIAGNOSIS — R30.0 DYSURIA: Primary | ICD-10-CM

## 2020-09-30 DIAGNOSIS — R82.90 ABNORMAL URINE: ICD-10-CM

## 2020-09-30 LAB
BILIRUB BLD-MCNC: NEGATIVE MG/DL
CLARITY, POC: CLEAR
COLOR UR: YELLOW
GLUCOSE UR STRIP-MCNC: NEGATIVE MG/DL
KETONES UR QL: NEGATIVE
LEUKOCYTE EST, POC: NEGATIVE
NITRITE UR-MCNC: NEGATIVE MG/ML
PH UR: 6 [PH] (ref 5–8)
PROT UR STRIP-MCNC: ABNORMAL MG/DL
RBC # UR STRIP: ABNORMAL /UL
SP GR UR: 1.03 (ref 1–1.03)
UROBILINOGEN UR QL: NORMAL

## 2020-09-30 PROCEDURE — 99213 OFFICE O/P EST LOW 20 MIN: CPT | Performed by: NURSE PRACTITIONER

## 2020-09-30 PROCEDURE — 87086 URINE CULTURE/COLONY COUNT: CPT | Performed by: NURSE PRACTITIONER

## 2020-09-30 RX ORDER — SULFAMETHOXAZOLE AND TRIMETHOPRIM 800; 160 MG/1; MG/1
1 TABLET ORAL 2 TIMES DAILY
Qty: 6 TABLET | Refills: 0 | Status: SHIPPED | OUTPATIENT
Start: 2020-09-30 | End: 2020-10-12

## 2020-10-01 LAB — BACTERIA SPEC AEROBE CULT: NORMAL

## 2020-10-07 ENCOUNTER — OFFICE VISIT (OUTPATIENT)
Dept: FAMILY MEDICINE CLINIC | Facility: CLINIC | Age: 45
End: 2020-10-07

## 2020-10-07 VITALS
WEIGHT: 176 LBS | OXYGEN SATURATION: 95 % | SYSTOLIC BLOOD PRESSURE: 115 MMHG | HEIGHT: 67 IN | DIASTOLIC BLOOD PRESSURE: 78 MMHG | BODY MASS INDEX: 27.62 KG/M2 | TEMPERATURE: 99.1 F | HEART RATE: 93 BPM

## 2020-10-07 DIAGNOSIS — J45.40 MODERATE PERSISTENT ASTHMA, UNSPECIFIED WHETHER COMPLICATED: ICD-10-CM

## 2020-10-07 DIAGNOSIS — R50.9 FEVER, UNSPECIFIED FEVER CAUSE: Primary | ICD-10-CM

## 2020-10-07 PROCEDURE — 99214 OFFICE O/P EST MOD 30 MIN: CPT | Performed by: NURSE PRACTITIONER

## 2020-10-07 PROCEDURE — 96372 THER/PROPH/DIAG INJ SC/IM: CPT | Performed by: NURSE PRACTITIONER

## 2020-10-07 PROCEDURE — U0004 COV-19 TEST NON-CDC HGH THRU: HCPCS | Performed by: NURSE PRACTITIONER

## 2020-10-07 RX ORDER — ALBUTEROL SULFATE 90 UG/1
2 AEROSOL, METERED RESPIRATORY (INHALATION) EVERY 6 HOURS PRN
Qty: 6.7 G | Refills: 2 | Status: SHIPPED | OUTPATIENT
Start: 2020-10-07 | End: 2022-07-18 | Stop reason: SDUPTHER

## 2020-10-07 RX ORDER — AZITHROMYCIN 250 MG/1
250 TABLET, FILM COATED ORAL DAILY
Qty: 6 TABLET | Refills: 0 | Status: SHIPPED | OUTPATIENT
Start: 2020-10-07 | End: 2020-10-12

## 2020-10-07 RX ORDER — ONDANSETRON 4 MG/1
4 TABLET, FILM COATED ORAL EVERY 8 HOURS PRN
Qty: 12 TABLET | Refills: 0 | Status: SHIPPED | OUTPATIENT
Start: 2020-10-07 | End: 2021-07-13

## 2020-10-07 RX ORDER — CEFTRIAXONE 1 G/1
1 INJECTION, POWDER, FOR SOLUTION INTRAMUSCULAR; INTRAVENOUS ONCE
Status: COMPLETED | OUTPATIENT
Start: 2020-10-07 | End: 2020-10-07

## 2020-10-07 RX ADMIN — CEFTRIAXONE 1 G: 1 INJECTION, POWDER, FOR SOLUTION INTRAMUSCULAR; INTRAVENOUS at 16:49

## 2020-10-07 NOTE — PROGRESS NOTES
"Subjective   Meghan Rivas is a 44 y.o. female.   Chief Compliant: The patient presents with Fever    Fever   This is a new problem. Episode onset: 72 hours. The problem occurs intermittently. The problem has been waxing and waning. The temperature was taken using a tympanic thermometer. Associated symptoms include congestion, coughing, headaches, muscle aches, nausea, sleepiness, a sore throat and wheezing. Pertinent negatives include no rash. She has tried NSAIDs and fluids for the symptoms. The treatment provided mild relief.   Risk factors: no contaminated food and no sick contacts (deneis any known COVID or sick exposures)    Risk factors comment:  Known moderate asthma with recurrent pneumonia     The following portions of the patient's history were reviewed and updated as appropriate: allergies, current medications, past family history, past medical history, past social history, past surgical history and problem list.      Review of Systems   Constitutional: Positive for fever.   HENT: Positive for congestion and sore throat.    Respiratory: Positive for cough, shortness of breath and wheezing.    Cardiovascular: Negative.    Gastrointestinal: Positive for nausea.   Musculoskeletal: Positive for myalgias.   Skin: Negative.  Negative for rash.   Neurological: Positive for headaches. Negative for dizziness, tremors, seizures, syncope, facial asymmetry, speech difficulty, weakness, light-headedness and numbness.   All other systems reviewed and are negative.      Procedures    Vitals: Blood pressure 115/78, pulse 93, temperature 99.1 °F (37.3 °C), temperature source Oral, height 170.2 cm (67\"), weight 79.8 kg (176 lb), SpO2 95 %, not currently breastfeeding.     Allergies:   Allergies   Allergen Reactions   • Contrast Dye Anaphylaxis          Objective   Physical Exam  Vitals signs and nursing note reviewed.   Constitutional:       General: She is not in acute distress.     Appearance: She is well-developed. She " is not ill-appearing.   HENT:      Right Ear: Tympanic membrane, ear canal and external ear normal.      Left Ear: Tympanic membrane, ear canal and external ear normal.      Mouth/Throat:      Mouth: Mucous membranes are moist.      Pharynx: No posterior oropharyngeal erythema.   Eyes:      Conjunctiva/sclera: Conjunctivae normal.   Cardiovascular:      Rate and Rhythm: Regular rhythm. Tachycardia present.      Heart sounds: Normal heart sounds. No murmur.   Pulmonary:      Effort: Pulmonary effort is normal.      Breath sounds: Wheezing (throughout) present.   Skin:     General: Skin is warm and dry.      Findings: No rash.   Neurological:      Mental Status: She is alert and oriented to person, place, and time.   Psychiatric:         Behavior: Behavior normal.         During this visit the following were done:  Labs Reviewed []    Labs Ordered [x]    Radiology Reports Reviewed []    Radiology Ordered []    PCP Records Reviewed []    Referring Provider Records Reviewed []    ER Records Reviewed []    Hospital Records Reviewed []    History Obtained From Family []    Radiology Images Reviewed []    Other Reviewed [x]    Records Requested []      Assessment/Plan   Discussed with patient impression and plan, patient verbalizes understanding    Meghan was seen today for fever.    Diagnoses and all orders for this visit:    Fever, unspecified fever cause  -     azithromycin (Zithromax Z-Matt) 250 MG tablet; Take 1 tablet by mouth Daily. Take 2 tablets by mouth the first day, then 1 tablet daily for 4 days.  -     albuterol sulfate  (90 Base) MCG/ACT inhaler; Inhale 2 puffs Every 6 (Six) Hours As Needed for Wheezing.  -     COVID-19,[facility], NP SWAB IN [facility] VIRAL TRANSPORT MEDIA 24-30 HR TAT - Swab, Nasopharynx; Future  -     COVID-19,[facility], NP SWAB IN [place] VIRAL TRANSPORT MEDIA 24-30 HR TAT - Swab, Nasopharynx  -     cefTRIAXone (ROCEPHIN) injection 1 g    Moderate persistent asthma, unspecified whether  complicated  -     cefTRIAXone (ROCEPHIN) injection 1 g    Other orders  -     ondansetron (Zofran) 4 MG tablet; Take 1 tablet by mouth Every 8 (Eight) Hours As Needed for Nausea or Vomiting.    Instructed to quarantine within her home.  Continue to treat symptoms RTC if any new or worsening concerns

## 2020-10-08 LAB — SARS-COV-2 RNA RESP QL NAA+PROBE: NOT DETECTED

## 2020-10-09 ENCOUNTER — TELEPHONE (OUTPATIENT)
Dept: FAMILY MEDICINE CLINIC | Facility: CLINIC | Age: 45
End: 2020-10-09

## 2020-10-12 ENCOUNTER — OFFICE VISIT (OUTPATIENT)
Dept: FAMILY MEDICINE CLINIC | Facility: CLINIC | Age: 45
End: 2020-10-12

## 2020-10-12 VITALS
HEIGHT: 67 IN | TEMPERATURE: 97.1 F | WEIGHT: 177.8 LBS | HEART RATE: 80 BPM | DIASTOLIC BLOOD PRESSURE: 80 MMHG | SYSTOLIC BLOOD PRESSURE: 102 MMHG | OXYGEN SATURATION: 96 % | BODY MASS INDEX: 27.91 KG/M2

## 2020-10-12 DIAGNOSIS — J84.9 INTERSTITIAL PNEUMONIA (HCC): Primary | ICD-10-CM

## 2020-10-12 PROCEDURE — 80053 COMPREHEN METABOLIC PANEL: CPT | Performed by: FAMILY MEDICINE

## 2020-10-12 PROCEDURE — 99214 OFFICE O/P EST MOD 30 MIN: CPT | Performed by: FAMILY MEDICINE

## 2020-10-12 PROCEDURE — 85025 COMPLETE CBC W/AUTO DIFF WBC: CPT | Performed by: FAMILY MEDICINE

## 2020-10-12 PROCEDURE — 84145 PROCALCITONIN (PCT): CPT | Performed by: FAMILY MEDICINE

## 2020-10-12 PROCEDURE — 83615 LACTATE (LD) (LDH) ENZYME: CPT | Performed by: FAMILY MEDICINE

## 2020-10-12 PROCEDURE — 82728 ASSAY OF FERRITIN: CPT | Performed by: FAMILY MEDICINE

## 2020-10-12 RX ORDER — AMOXICILLIN AND CLAVULANATE POTASSIUM 875; 125 MG/1; MG/1
1 TABLET, FILM COATED ORAL 2 TIMES DAILY
Qty: 20 TABLET | Refills: 0 | Status: SHIPPED | OUTPATIENT
Start: 2020-10-12 | End: 2021-01-22

## 2020-10-12 RX ORDER — IPRATROPIUM BROMIDE AND ALBUTEROL SULFATE 2.5; .5 MG/3ML; MG/3ML
3 SOLUTION RESPIRATORY (INHALATION) EVERY 4 HOURS PRN
Qty: 360 ML | Refills: 1 | Status: SHIPPED | OUTPATIENT
Start: 2020-10-12 | End: 2021-07-13

## 2020-10-12 RX ORDER — BENZONATATE 100 MG/1
100 CAPSULE ORAL 3 TIMES DAILY PRN
Qty: 90 CAPSULE | Refills: 0 | Status: SHIPPED | OUTPATIENT
Start: 2020-10-12 | End: 2021-01-22

## 2020-10-12 RX ORDER — PREDNISONE 10 MG/1
TABLET ORAL
Qty: 30 TABLET | Refills: 0 | Status: SHIPPED | OUTPATIENT
Start: 2020-10-12 | End: 2021-01-22

## 2020-10-12 RX ORDER — DOXYCYCLINE HYCLATE 100 MG/1
100 TABLET, DELAYED RELEASE ORAL 2 TIMES DAILY
Qty: 20 TABLET | Refills: 0 | Status: SHIPPED | OUTPATIENT
Start: 2020-10-12 | End: 2021-01-22

## 2020-10-13 LAB
ALBUMIN SERPL-MCNC: 4.4 G/DL (ref 3.5–5.2)
ALBUMIN/GLOB SERPL: 1.4 G/DL
ALP SERPL-CCNC: 46 U/L (ref 39–117)
ALT SERPL W P-5'-P-CCNC: 9 U/L (ref 1–33)
ANION GAP SERPL CALCULATED.3IONS-SCNC: 9.7 MMOL/L (ref 5–15)
AST SERPL-CCNC: 22 U/L (ref 1–32)
BASOPHILS # BLD AUTO: 0.04 10*3/MM3 (ref 0–0.2)
BASOPHILS NFR BLD AUTO: 0.7 % (ref 0–1.5)
BILIRUB SERPL-MCNC: 0.3 MG/DL (ref 0–1.2)
BUN SERPL-MCNC: 13 MG/DL (ref 6–20)
BUN/CREAT SERPL: 21.3 (ref 7–25)
CALCIUM SPEC-SCNC: 9.4 MG/DL (ref 8.6–10.5)
CHLORIDE SERPL-SCNC: 102 MMOL/L (ref 98–107)
CO2 SERPL-SCNC: 25.3 MMOL/L (ref 22–29)
CREAT SERPL-MCNC: 0.61 MG/DL (ref 0.57–1)
DEPRECATED RDW RBC AUTO: 43.4 FL (ref 37–54)
EOSINOPHIL # BLD AUTO: 0.33 10*3/MM3 (ref 0–0.4)
EOSINOPHIL NFR BLD AUTO: 5.7 % (ref 0.3–6.2)
ERYTHROCYTE [DISTWIDTH] IN BLOOD BY AUTOMATED COUNT: 14.1 % (ref 12.3–15.4)
FERRITIN SERPL-MCNC: 38.2 NG/ML (ref 13–150)
GFR SERPL CREATININE-BSD FRML MDRD: 107 ML/MIN/1.73
GLOBULIN UR ELPH-MCNC: 3.2 GM/DL
GLUCOSE SERPL-MCNC: 80 MG/DL (ref 65–99)
HCT VFR BLD AUTO: 41.2 % (ref 34–46.6)
HGB BLD-MCNC: 14 G/DL (ref 12–15.9)
IMM GRANULOCYTES # BLD AUTO: 0.01 10*3/MM3 (ref 0–0.05)
IMM GRANULOCYTES NFR BLD AUTO: 0.2 % (ref 0–0.5)
LDH SERPL-CCNC: 146 U/L (ref 135–214)
LYMPHOCYTES # BLD AUTO: 2.19 10*3/MM3 (ref 0.7–3.1)
LYMPHOCYTES NFR BLD AUTO: 38 % (ref 19.6–45.3)
MCH RBC QN AUTO: 28.7 PG (ref 26.6–33)
MCHC RBC AUTO-ENTMCNC: 34 G/DL (ref 31.5–35.7)
MCV RBC AUTO: 84.6 FL (ref 79–97)
MONOCYTES # BLD AUTO: 0.55 10*3/MM3 (ref 0.1–0.9)
MONOCYTES NFR BLD AUTO: 9.5 % (ref 5–12)
NEUTROPHILS NFR BLD AUTO: 2.64 10*3/MM3 (ref 1.7–7)
NEUTROPHILS NFR BLD AUTO: 45.9 % (ref 42.7–76)
NRBC BLD AUTO-RTO: 0 /100 WBC (ref 0–0.2)
PLATELET # BLD AUTO: 204 10*3/MM3 (ref 140–450)
PMV BLD AUTO: 11.2 FL (ref 6–12)
POTASSIUM SERPL-SCNC: 4.2 MMOL/L (ref 3.5–5.2)
PROCALCITONIN SERPL-MCNC: <0.02 NG/ML (ref 0–0.25)
PROT SERPL-MCNC: 7.6 G/DL (ref 6–8.5)
RBC # BLD AUTO: 4.87 10*6/MM3 (ref 3.77–5.28)
SODIUM SERPL-SCNC: 137 MMOL/L (ref 136–145)
WBC # BLD AUTO: 5.76 10*3/MM3 (ref 3.4–10.8)

## 2020-10-14 ENCOUNTER — TELEPHONE (OUTPATIENT)
Dept: FAMILY MEDICINE CLINIC | Facility: CLINIC | Age: 45
End: 2020-10-14

## 2020-10-14 NOTE — TELEPHONE ENCOUNTER
----- Message from Salome Li MD sent at 10/14/2020  8:06 AM EDT -----  Please call Meghan. Labs are okay. This is her usual bronchitis/pneumonia deal. The labs are not consistent with COVID findings. How is she feeling?    Left message for patient to return call.    Patient states she is feeling terrible she feels no better. She has started the medication yesterday so she is hoping to start feeling better anytime.

## 2020-10-19 ENCOUNTER — OFFICE VISIT (OUTPATIENT)
Dept: FAMILY MEDICINE CLINIC | Facility: CLINIC | Age: 45
End: 2020-10-19

## 2020-10-19 ENCOUNTER — HOSPITAL ENCOUNTER (OUTPATIENT)
Dept: GENERAL RADIOLOGY | Facility: HOSPITAL | Age: 45
Discharge: HOME | End: 2020-10-19
Admitting: FAMILY MEDICINE

## 2020-10-19 VITALS
BODY MASS INDEX: 27.81 KG/M2 | HEART RATE: 79 BPM | OXYGEN SATURATION: 97 % | HEIGHT: 67 IN | SYSTOLIC BLOOD PRESSURE: 118 MMHG | TEMPERATURE: 97.1 F | DIASTOLIC BLOOD PRESSURE: 78 MMHG | WEIGHT: 177.2 LBS

## 2020-10-19 DIAGNOSIS — R05.9 COUGH: Primary | ICD-10-CM

## 2020-10-19 PROCEDURE — 71046 X-RAY EXAM CHEST 2 VIEWS: CPT

## 2020-10-19 PROCEDURE — 71046 X-RAY EXAM CHEST 2 VIEWS: CPT | Performed by: RADIOLOGY

## 2020-10-19 PROCEDURE — 99213 OFFICE O/P EST LOW 20 MIN: CPT | Performed by: FAMILY MEDICINE

## 2020-10-21 ENCOUNTER — TELEPHONE (OUTPATIENT)
Dept: FAMILY MEDICINE CLINIC | Facility: CLINIC | Age: 45
End: 2020-10-21

## 2020-10-21 NOTE — TELEPHONE ENCOUNTER
----- Message from Salome Li MD sent at 10/21/2020  8:54 AM EDT -----  Labs stable. Okay to either call or send letter to patient. Thanks.    Left message for patient.

## 2020-11-02 NOTE — PROGRESS NOTES
"Meghan Rivas     VITALS: Blood pressure 102/80, pulse 80, temperature 97.1 °F (36.2 °C), temperature source Temporal, height 170.2 cm (67.01\"), weight 80.6 kg (177 lb 12.8 oz), SpO2 96 %, not currently breastfeeding.    Subjective  Chief Complaint:   Chief Complaint   Patient presents with   • Cough   • Shortness of Breath        History of Present Illness:  Patient is a 44 y.o.  female with chronic medical conditions significant for hepatitis C and bipolar disorder who presents to clinic secondary to an acute concern.  Patient has been having a week and a half history of shortness of breath, nonproductive coughing, congestion, and increased pain in both ears.  She does not think that she is having any fevers, but she is occasionally having chills.  She denies any rhinorrhea or edema.  She is having palpitations.  She was seen approximately a week ago and given a Z-Matt, which she says did not help.  Covid was negative.  No one else at home with similar symptoms.  She has not tried anything else for the symptoms.    No complaints about any of the medications.    The following portions of the patient's history were reviewed and updated as appropriate: allergies, current medications, past family history, past medical history, past social history, past surgical history and problem list.    Past Medical History  Past Medical History:   Diagnosis Date   • Anxiety    • Depression    • Hepatitis C    • History of drug abuse (CMS/HCC)    • Seizures (CMS/HCC)        Review of Systems   Respiratory: Positive for shortness of breath and wheezing.    Cardiovascular: Positive for palpitations. Negative for chest pain.       Surgical History  Past Surgical History:   Procedure Laterality Date   • OVARIAN CYST SURGERY      x7 surgeries       Family History  Family History   Adopted: Yes       Social History  Social History     Socioeconomic History   • Marital status:      Spouse name: Not on file   • Number of children: " Not on file   • Years of education: Not on file   • Highest education level: Not on file   Tobacco Use   • Smoking status: Current Every Day Smoker     Packs/day: 0.50     Years: 3.50     Pack years: 1.75     Types: Cigarettes   • Smokeless tobacco: Never Used   Substance and Sexual Activity   • Alcohol use: No     Frequency: Never   • Drug use: Yes     Types: IV, Methamphetamines     Comment: quit 19 months ago   • Sexual activity: Defer       Objective  Physical Exam    Gen: Patient in some distress. Pleasant and answers appropriately. A&Ox3.    Skin: Warm and dry with normal turgor. No purpura, rashes, or unusual pigmentation noted. Hair is normal in appearance and distribution.    HEENT: NC/AT. No lesions noted. Conjunctiva clear, sclera nonicteric. PERRL. EOMI without nystagmus or strabismus. Fundi appear benign. No hemorrhages or exudates of eyes. Auditory canals are patent bilaterally without lesions. TMs intact,  nonerythematous, slightly bulging without lesions. Nasal mucosa pink, nonerythematous, and nonedematous. Frontal and maxillary sinuses are nontender. O/P erythematous and moist with exudate.    Neck: Supple without lymph nodes palpated. FROM.     Lungs: Coarse and decreased B/L with occasional expiratory wheezing diffusely bilaterally along with occasional lower field crackles bilaterally.  No rales, rhonchi noted.    Heart: RRR. S1 and S2 normal. No S3 or S4. No MRGT.    Abd: Soft, nontender,nondistended. (+)BSx4 quadrants.     Extrem: No CCE. Radial pulses 2+/4 and equal B/L. FROMx4. No bone, joint, or muscle tenderness noted.    Neuro: No focal motor/sensory deficits.    Procedures    Assessment/Plan  Meghan Rivas is a 44 y.o. here for an acute concern.    Diagnoses and all orders for this visit:    1. Interstitial pneumonia (CMS/HCC) (Primary)  -     amoxicillin-clavulanate (AUGMENTIN) 875-125 MG per tablet; Take 1 tablet by mouth 2 (Two) Times a Day.  Dispense: 20 tablet; Refill: 0  -      doxycycline (DORYX) 100 MG enteric coated tablet; Take 1 tablet by mouth 2 (Two) Times a Day.  Dispense: 20 tablet; Refill: 0  -     benzonatate (Tessalon Perles) 100 MG capsule; Take 1 capsule by mouth 3 (Three) Times a Day As Needed for Cough.  Dispense: 90 capsule; Refill: 0  -     ipratropium-albuterol (DUO-NEB) 0.5-2.5 mg/3 ml nebulizer; Take 3 mL by nebulization Every 4 (Four) Hours As Needed for Wheezing.  Dispense: 360 mL; Refill: 1  -     predniSONE (DELTASONE) 10 MG tablet; Take 40 mg daily x 4 days, then 30 mg daily x 3 days, then 20 mg daily x 2 days, then 10 mg daily x 1 day.  Dispense: 30 tablet; Refill: 0  -     CBC Auto Differential; Future  -     Comprehensive Metabolic Panel; Future  -     Ferritin; Future  -     Lactate Dehydrogenase; Future  -     Procalcitonin; Future  -     CBC Auto Differential  -     Comprehensive Metabolic Panel  -     Ferritin  -     Lactate Dehydrogenase  -     Procalcitonin  We will do lab work-up.  May need chest x-ray or CT chest..  Prednisone taper given.  Will start patient on doxycycline 100 mg orally twice a day along with Augmentin 875/125 twice a day x10 days both.  Tessalon Perles sent to the pharmacy along with 3-way cough syrup.  Patient to use duo nebs outside.  Continue to monitor closely.  Repeat appointment in a week.  Patient to call if symptoms worsen.      Patient's Body mass index is 27.84 kg/m². BMI is above normal parameters. Recommendations include: exercise counseling and nutrition counseling.       Meghan Rivas  reports that she has been smoking cigarettes. She has a 1.75 pack-year smoking history. She has never used smokeless tobacco.. I have educated her on the risk of diseases from using tobacco products such as cancer, COPD and heart disease.     I advised her to quit and she is not willing to quit.    I spent 3  minutes counseling the patient.          Findings and plans discussed with patient who verbalizes understanding and agreement. Will  followup with patient once results are in. Patient to followup at clinic PRN or in one week for further medical followup.    Salome Li MD    EMR Dragon/Transcription Disclaimer:  Much of this encounter note is an electronic transcription/translation of spoken language to printed text.  The electronic translation of spoken language may permit erroneous, or at times, nonsensical words or phrases to be inadvertently transcribed.  Although I have reviewed the note for such errors, some may still exist.

## 2020-11-09 NOTE — PROGRESS NOTES
"Meghan Rivas     VITALS: Blood pressure 118/78, pulse 79, temperature 97.1 °F (36.2 °C), height 170.2 cm (67\"), weight 80.4 kg (177 lb 3.2 oz), SpO2 97 %, not currently breastfeeding.    Subjective  Chief Complaint:   Chief Complaint   Patient presents with   • Pneumonia     FW        History of Present Illness:  Patient is a 44 y.o.  female with chronic medical conditions including seizures and depression who presents to clinic secondary to an acute concern.  Patient has been having a 3-week history of shortness of breath, nonproductive coughing, malaise, and fatigue.  She denies any fevers, but she has been having chills.  She denies any ear pain, rhinorrhea, sore throat, or chest pain.  She has been having occasional palpitations, but this has gotten better.  Patient has been tested for Covid and is negative.  Patient is continuing on 2 antibiotic courses of doxycycline and Augmentin.  She states that she feels a little bit better, but she continues to have symptoms.  No one else at home has similar symptoms.  She has not tried anything over-the-counter.    No complaints about any of the medications.    The following portions of the patient's history were reviewed and updated as appropriate: allergies, current medications, past family history, past medical history, past social history, past surgical history and problem list.    Past Medical History  Past Medical History:   Diagnosis Date   • Anxiety    • Depression    • Hepatitis C    • History of drug abuse (CMS/HCC)    • Seizures (CMS/[facility])        Review of Systems   Respiratory: Positive for shortness of breath and wheezing.    Cardiovascular: Positive for palpitations. Negative for chest pain.       Surgical History  Past Surgical History:   Procedure Laterality Date   • OVARIAN CYST SURGERY      x7 surgeries       Family History  Family History   Adopted: Yes       Social History  Social History     Socioeconomic History   • Marital status:      " Spouse name: Not on file   • Number of children: Not on file   • Years of education: Not on file   • Highest education level: Not on file   Tobacco Use   • Smoking status: Current Every Day Smoker     Packs/day: 0.50     Years: 3.50     Pack years: 1.75     Types: Cigarettes   • Smokeless tobacco: Never Used   Substance and Sexual Activity   • Alcohol use: No     Frequency: Never   • Drug use: Yes     Types: IV, Methamphetamines     Comment: quit 19 months ago   • Sexual activity: Defer       Objective  Physical Exam    Gen: Patient in distress. Pleasant and answers appropriately. A&Ox3.    Skin: Warm and dry with normal turgor. No purpura, rashes, or unusual pigmentation noted. Hair is normal in appearance and distribution.    HEENT: NC/AT. No lesions noted. Conjunctiva clear, sclera nonicteric. PERRL. EOMI without nystagmus or strabismus. Fundi appear benign. No hemorrhages or exudates of eyes. Auditory canals are patent bilaterally without lesions. TMs intact,  nonerythematous, nonbulging without lesions. Nasal mucosa erythematous, and edematous. Frontal and maxillary sinuses are nontender. O/P erythematous and moist without exudate.    Neck: Supple without lymph nodes palpated. FROM.     Lungs: Coarse and decreased B/L with occasional rhonchi diffusely in all lung fields.  No rales, crackles, or wheezes.    Heart: RRR. S1 and S2 normal. No S3 or S4. No MRGT.    Abd: Soft, nontender,nondistended. (+)BSx4 quadrants.     Extrem: No CCE. Radial pulses 2+/4 and equal B/L. FROMx4.     Neuro: No focal motor/sensory deficits.    Procedures    Assessment/Plan  Meghan Rivas is a 44 y.o. here for an acute concern.    Diagnoses and all orders for this visit:    1. Cough (Primary)  -     XR Chest 2 View  -     CT Chest Without Contrast  Uncertain etiology.  She currently is in the middle of 2 antibiotic courses, doxycycline and Augmentin.  Secondary to continued and unimproved symptoms along with a history of a lung nodule,  will get a chest x-ray and a CT chest.      Patient's Body mass index is 27.75 kg/m². BMI is above normal parameters. Recommendations include: exercise counseling and nutrition counseling.       Meghan Rivas  reports that she has been smoking cigarettes. She has a 1.75 pack-year smoking history. She has never used smokeless tobacco.. I have educated her on the risk of diseases from using tobacco products such as cancer, COPD and heart disease.     I advised her to quit and she is not willing to quit.    I spent 3  minutes counseling the patient.          Findings and plans discussed with patient who verbalizes understanding and agreement. Will followup with patient once results are in. Patient to followup at clinic PRN or in one week for further medical followup.    MD NICKY Palafox Dragon/Transcription Disclaimer:  Much of this encounter note is an electronic transcription/translation of spoken language to printed text.  The electronic translation of spoken language may permit erroneous, or at times, nonsensical words or phrases to be inadvertently transcribed.  Although I have reviewed the note for such errors, some may still exist.

## 2021-01-22 ENCOUNTER — OFFICE VISIT (OUTPATIENT)
Dept: FAMILY MEDICINE CLINIC | Facility: CLINIC | Age: 46
End: 2021-01-22

## 2021-01-22 VITALS
HEART RATE: 121 BPM | BODY MASS INDEX: 28.56 KG/M2 | DIASTOLIC BLOOD PRESSURE: 82 MMHG | WEIGHT: 182 LBS | SYSTOLIC BLOOD PRESSURE: 120 MMHG | OXYGEN SATURATION: 99 % | HEIGHT: 67 IN | TEMPERATURE: 97.3 F

## 2021-01-22 DIAGNOSIS — L02.91 ABSCESS: Primary | ICD-10-CM

## 2021-01-22 PROCEDURE — 99213 OFFICE O/P EST LOW 20 MIN: CPT | Performed by: NURSE PRACTITIONER

## 2021-01-22 RX ORDER — SULFAMETHOXAZOLE AND TRIMETHOPRIM 800; 160 MG/1; MG/1
1 TABLET ORAL 2 TIMES DAILY
Qty: 14 TABLET | Refills: 0 | Status: SHIPPED | OUTPATIENT
Start: 2021-01-22 | End: 2021-07-13

## 2021-01-22 NOTE — PROGRESS NOTES
"Chief Complaint  Abscess (right armpit )    Subjective          Meghan Rivas presents to [facility] for   Abrasion  This is a new (abscess to right axillae) problem. The current episode started in the past 7 days. The problem occurs constantly. Progression since onset: yellow bloody drainage started from area yesterday. Nothing aggravates the symptoms. Treatments tried: she has been using a \"salve\" that she got from neighbor. The treatment provided mild relief.       Objective   Vital Signs:   /82 (BP Location: Left arm, Patient Position: Sitting, Cuff Size: Adult)   Pulse (!) 121   Temp 97.3 °F (36.3 °C)   Ht 170.2 cm (67\")   Wt 82.6 kg (182 lb)   SpO2 99%   BMI 28.51 kg/m²     Physical Exam  Vitals signs and nursing note reviewed.   Constitutional:       Appearance: She is well-developed.   Neck:      Musculoskeletal: Normal range of motion and neck supple.   Cardiovascular:      Rate and Rhythm: Normal rate.   Pulmonary:      Effort: Pulmonary effort is normal.   Skin:     General: Skin is warm and dry.      Comments: Quarter size indurated area to right axillae with central opening with yellow and bloody drainage noted   Neurological:      Mental Status: She is alert and oriented to person, place, and time.   Psychiatric:         Behavior: Behavior normal.         Thought Content: Thought content normal.         Judgment: Judgment normal.        Result Review :                 Assessment and Plan    Problem List Items Addressed This Visit     None      Visit Diagnoses     Abscess    -  Primary    Relevant Medications    sulfamethoxazole-trimethoprim (BACTRIM DS,SEPTRA DS) 800-160 MG per tablet  Advised pt to use warm wet compresses to area.  Keep area clean and dry.  No deodorant use until healed.    Yellow and bloody drainage from drainage with pressing on area.  Area seems to be less indurated after pressing out small amount of yellow drainage.    RTC in one week " if s/s not resolved.          Follow Up   Return if symptoms worsen or fail to improve.  Patient was given instructions and counseling regarding her condition or for health maintenance advice. Please see specific information pulled into the AVS if appropriate.

## 2021-07-13 ENCOUNTER — OFFICE VISIT (OUTPATIENT)
Dept: PSYCHIATRY | Facility: CLINIC | Age: 46
End: 2021-07-13

## 2021-07-13 VITALS
BODY MASS INDEX: 28.85 KG/M2 | HEIGHT: 67 IN | HEART RATE: 92 BPM | WEIGHT: 183.8 LBS | DIASTOLIC BLOOD PRESSURE: 82 MMHG | SYSTOLIC BLOOD PRESSURE: 118 MMHG

## 2021-07-13 DIAGNOSIS — F40.10 SOCIAL ANXIETY DISORDER: ICD-10-CM

## 2021-07-13 DIAGNOSIS — F17.200 NICOTINE USE DISORDER: ICD-10-CM

## 2021-07-13 DIAGNOSIS — F15.21 METHAMPHETAMINE USE DISORDER, SEVERE, IN SUSTAINED REMISSION (HCC): ICD-10-CM

## 2021-07-13 DIAGNOSIS — F41.1 GAD (GENERALIZED ANXIETY DISORDER): ICD-10-CM

## 2021-07-13 DIAGNOSIS — F12.10 CANNABIS ABUSE, EPISODIC: ICD-10-CM

## 2021-07-13 DIAGNOSIS — F43.12 CHRONIC POST-TRAUMATIC STRESS DISORDER (PTSD): Primary | ICD-10-CM

## 2021-07-13 PROCEDURE — 90792 PSYCH DIAG EVAL W/MED SRVCS: CPT | Performed by: PSYCHIATRY & NEUROLOGY

## 2021-07-13 RX ORDER — PROPRANOLOL HYDROCHLORIDE 10 MG/1
TABLET ORAL
Qty: 60 TABLET | Refills: 1 | Status: SHIPPED | OUTPATIENT
Start: 2021-07-13 | End: 2021-10-15 | Stop reason: SDUPTHER

## 2021-07-13 RX ORDER — ARIPIPRAZOLE 5 MG/1
TABLET ORAL
Qty: 30 TABLET | Refills: 1 | Status: SHIPPED | OUTPATIENT
Start: 2021-07-13 | End: 2021-09-14 | Stop reason: SDUPTHER

## 2021-07-13 RX ORDER — VILAZODONE HYDROCHLORIDE 20 MG/1
20 TABLET ORAL DAILY
COMMUNITY
End: 2021-07-13

## 2021-07-13 RX ORDER — VENLAFAXINE HYDROCHLORIDE 75 MG/1
75 CAPSULE, EXTENDED RELEASE ORAL DAILY
Qty: 30 CAPSULE | Refills: 0 | Status: SHIPPED | OUTPATIENT
Start: 2021-07-13 | End: 2021-08-13 | Stop reason: SDUPTHER

## 2021-07-13 RX ORDER — VENLAFAXINE HYDROCHLORIDE 37.5 MG/1
37.5 CAPSULE, EXTENDED RELEASE ORAL DAILY
Qty: 7 CAPSULE | Refills: 0 | Status: SHIPPED | OUTPATIENT
Start: 2021-07-13 | End: 2021-09-14 | Stop reason: SDUPTHER
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
## 2021-08-13 DIAGNOSIS — F40.10 SOCIAL ANXIETY DISORDER: ICD-10-CM

## 2021-08-13 DIAGNOSIS — F41.1 GAD (GENERALIZED ANXIETY DISORDER): ICD-10-CM

## 2021-08-13 DIAGNOSIS — F43.12 CHRONIC POST-TRAUMATIC STRESS DISORDER (PTSD): ICD-10-CM

## 2021-08-13 RX ORDER — VENLAFAXINE HYDROCHLORIDE 75 MG/1
75 CAPSULE, EXTENDED RELEASE ORAL DAILY
Qty: 30 CAPSULE | Refills: 0 | Status: SHIPPED | OUTPATIENT
Start: 2021-08-13 | End: 2021-09-16 | Stop reason: SDUPTHER

## 2021-08-13 RX ORDER — VENLAFAXINE HYDROCHLORIDE 75 MG/1
75 CAPSULE, EXTENDED RELEASE ORAL DAILY
Qty: 30 CAPSULE | Refills: 0 | Status: CANCELLED | OUTPATIENT
Start: 2021-08-13 | End: 2022-08-13

## 2021-09-14 DIAGNOSIS — F43.12 CHRONIC POST-TRAUMATIC STRESS DISORDER (PTSD): ICD-10-CM

## 2021-09-14 DIAGNOSIS — F41.1 GAD (GENERALIZED ANXIETY DISORDER): ICD-10-CM

## 2021-09-14 DIAGNOSIS — F40.10 SOCIAL ANXIETY DISORDER: ICD-10-CM

## 2021-09-14 RX ORDER — ARIPIPRAZOLE 5 MG/1
TABLET ORAL
Qty: 30 TABLET | Refills: 1 | Status: SHIPPED | OUTPATIENT
Start: 2021-09-14 | End: 2021-10-25 | Stop reason: SDUPTHER

## 2021-09-14 RX ORDER — VENLAFAXINE HYDROCHLORIDE 37.5 MG/1
37.5 CAPSULE, EXTENDED RELEASE ORAL DAILY
Qty: 7 CAPSULE | Refills: 0 | Status: SHIPPED | OUTPATIENT
Start: 2021-09-14 | End: 2021-10-12

## 2021-09-16 DIAGNOSIS — F43.12 CHRONIC POST-TRAUMATIC STRESS DISORDER (PTSD): ICD-10-CM

## 2021-09-16 DIAGNOSIS — F40.10 SOCIAL ANXIETY DISORDER: ICD-10-CM

## 2021-09-16 DIAGNOSIS — F41.1 GAD (GENERALIZED ANXIETY DISORDER): ICD-10-CM

## 2021-09-16 RX ORDER — VENLAFAXINE HYDROCHLORIDE 75 MG/1
75 CAPSULE, EXTENDED RELEASE ORAL DAILY
Qty: 30 CAPSULE | Refills: 0 | Status: SHIPPED | OUTPATIENT
Start: 2021-09-16 | End: 2021-10-15 | Stop reason: SDUPTHER

## 2021-09-17 ENCOUNTER — TELEPHONE (OUTPATIENT)
Dept: PSYCHIATRY | Facility: CLINIC | Age: 46
End: 2021-09-17

## 2021-09-17 NOTE — TELEPHONE ENCOUNTER
Was sent to pharmacy yesterday. I had received her message and got it fixed with Dr. Phipps. I had routed him the wrong prescription.

## 2021-09-17 NOTE — TELEPHONE ENCOUNTER
37.5 mg was a started dose of Effexor to taper up to 75 mg.  She should no longer be on 37.5.  She has the appropriate amount for 1 month of 75 mg

## 2021-10-12 ENCOUNTER — OFFICE VISIT (OUTPATIENT)
Dept: FAMILY MEDICINE CLINIC | Facility: CLINIC | Age: 46
End: 2021-10-12

## 2021-10-12 VITALS
BODY MASS INDEX: 29.16 KG/M2 | SYSTOLIC BLOOD PRESSURE: 108 MMHG | HEART RATE: 80 BPM | OXYGEN SATURATION: 98 % | DIASTOLIC BLOOD PRESSURE: 72 MMHG | WEIGHT: 185.8 LBS | HEIGHT: 67 IN | TEMPERATURE: 96.9 F

## 2021-10-12 DIAGNOSIS — F31.62 BIPOLAR DISORDER, CURRENT EPISODE MIXED, MODERATE (HCC): ICD-10-CM

## 2021-10-12 DIAGNOSIS — K21.9 GASTROESOPHAGEAL REFLUX DISEASE, UNSPECIFIED WHETHER ESOPHAGITIS PRESENT: ICD-10-CM

## 2021-10-12 DIAGNOSIS — K59.1 FUNCTIONAL DIARRHEA: Primary | ICD-10-CM

## 2021-10-12 PROCEDURE — 99214 OFFICE O/P EST MOD 30 MIN: CPT | Performed by: FAMILY MEDICINE

## 2021-10-12 RX ORDER — ALUMINUM ZIRCONIUM OCTACHLOROHYDREX GLY 16 G/100G
GEL TOPICAL 2 TIMES DAILY
Qty: 660 G | Refills: 11 | Status: ON HOLD | OUTPATIENT
Start: 2021-10-12 | End: 2022-03-07

## 2021-10-15 DIAGNOSIS — F43.12 CHRONIC POST-TRAUMATIC STRESS DISORDER (PTSD): ICD-10-CM

## 2021-10-15 DIAGNOSIS — F41.1 GAD (GENERALIZED ANXIETY DISORDER): ICD-10-CM

## 2021-10-15 DIAGNOSIS — F40.10 SOCIAL ANXIETY DISORDER: ICD-10-CM

## 2021-10-15 RX ORDER — PROPRANOLOL HYDROCHLORIDE 10 MG/1
TABLET ORAL
Qty: 60 TABLET | Refills: 0 | Status: ON HOLD | OUTPATIENT
Start: 2021-10-15 | End: 2022-03-07

## 2021-10-15 RX ORDER — VENLAFAXINE HYDROCHLORIDE 75 MG/1
75 CAPSULE, EXTENDED RELEASE ORAL DAILY
Qty: 30 CAPSULE | Refills: 0 | Status: SHIPPED | OUTPATIENT
Start: 2021-10-15 | End: 2021-10-25 | Stop reason: SDUPTHER

## 2021-10-25 ENCOUNTER — OFFICE VISIT (OUTPATIENT)
Dept: PSYCHIATRY | Facility: CLINIC | Age: 46
End: 2021-10-25

## 2021-10-25 VITALS
DIASTOLIC BLOOD PRESSURE: 80 MMHG | HEIGHT: 67 IN | SYSTOLIC BLOOD PRESSURE: 136 MMHG | HEART RATE: 82 BPM | WEIGHT: 189.4 LBS | BODY MASS INDEX: 29.73 KG/M2

## 2021-10-25 DIAGNOSIS — F40.10 SOCIAL ANXIETY DISORDER: ICD-10-CM

## 2021-10-25 DIAGNOSIS — F43.12 CHRONIC POST-TRAUMATIC STRESS DISORDER (PTSD): Primary | ICD-10-CM

## 2021-10-25 DIAGNOSIS — F12.10 CANNABIS ABUSE, EPISODIC: ICD-10-CM

## 2021-10-25 DIAGNOSIS — F17.200 NICOTINE USE DISORDER: ICD-10-CM

## 2021-10-25 DIAGNOSIS — F51.5 NIGHTMARES ASSOCIATED WITH CHRONIC POST-TRAUMATIC STRESS DISORDER: ICD-10-CM

## 2021-10-25 DIAGNOSIS — F43.12 NIGHTMARES ASSOCIATED WITH CHRONIC POST-TRAUMATIC STRESS DISORDER: ICD-10-CM

## 2021-10-25 DIAGNOSIS — F15.21 METHAMPHETAMINE USE DISORDER, SEVERE, IN SUSTAINED REMISSION (HCC): ICD-10-CM

## 2021-10-25 DIAGNOSIS — F41.1 GAD (GENERALIZED ANXIETY DISORDER): ICD-10-CM

## 2021-10-25 PROCEDURE — 99214 OFFICE O/P EST MOD 30 MIN: CPT | Performed by: PSYCHIATRY & NEUROLOGY

## 2021-10-25 RX ORDER — VENLAFAXINE HYDROCHLORIDE 75 MG/1
75 CAPSULE, EXTENDED RELEASE ORAL DAILY
Qty: 30 CAPSULE | Refills: 2 | Status: SHIPPED | OUTPATIENT
Start: 2021-10-25 | End: 2022-01-19 | Stop reason: SDUPTHER

## 2021-10-25 RX ORDER — LORAZEPAM 0.5 MG/1
0.5 TABLET ORAL DAILY PRN
Qty: 28 TABLET | Refills: 0 | Status: SHIPPED | OUTPATIENT
Start: 2021-10-25 | End: 2022-01-18 | Stop reason: SDUPTHER

## 2021-10-25 RX ORDER — ARIPIPRAZOLE 5 MG/1
5 TABLET ORAL DAILY
Qty: 30 TABLET | Refills: 2 | Status: SHIPPED | OUTPATIENT
Start: 2021-10-25 | End: 2022-01-18 | Stop reason: SDUPTHER

## 2021-10-25 RX ORDER — PRAZOSIN HYDROCHLORIDE 1 MG/1
1 CAPSULE ORAL NIGHTLY
Qty: 30 CAPSULE | Refills: 2 | Status: SHIPPED | OUTPATIENT
Start: 2021-10-25 | End: 2021-11-24 | Stop reason: SDUPTHER

## 2021-10-29 NOTE — PROGRESS NOTES
"Subjective   Meghan Rivas is a 45 y.o. female who presents today for follow up    Chief Complaint: History of bipolar disorder, PTSD, substance abuse, depression, anxiety    History of Present Illness: Patient presents today for follow-up.  She reports today that, \"it is amazing how great I feel.\"  She feels that the combination is working well for her.  She is not having any medication side effects.  She reports less fluctuations in her mood and her depression has reduced.  She does report that she is having increased nightmares related to PTSD causing disruptive sleep.  She is not having any medication side effects.  She denies any issues with appetite.  She denies SI/HI/AVH.    The following portions of the patient's history were reviewed and updated as appropriate: allergies, current medications, past family history, past medical history, past social history, past surgical history and problem list.      Past Medical History:  Past Medical History:   Diagnosis Date   • Anxiety    • Depression    • Hepatitis C    • History of drug abuse (HCC)    • Seizures (HCC)        Social History:  Social History     Socioeconomic History   • Marital status:    Tobacco Use   • Smoking status: Current Every Day Smoker     Packs/day: 0.50     Years: 3.50     Pack years: 1.75     Types: Cigarettes   • Smokeless tobacco: Never Used   Vaping Use   • Vaping Use: Some days   • Substances: Nicotine   • Devices: Refillable tank   Substance and Sexual Activity   • Alcohol use: No   • Drug use: Not Currently     Types: IV, Methamphetamines     Comment: quit 19 months ago   • Sexual activity: Defer       Family History:  Family History   Adopted: Yes       Past Surgical History:  Past Surgical History:   Procedure Laterality Date   • OVARIAN CYST SURGERY      x7 surgeries       Problem List:  Patient Active Problem List   Diagnosis   • Chronic hepatitis C without hepatic coma (HCC)   • MVP (mitral valve prolapse)   • Hx of drug " abuse (HCC)   • Leg neuralgia, right   • Anxiety and depression   • Gastroesophageal reflux disease   • Syncope       Allergy:   Allergies   Allergen Reactions   • Contrast Dye Anaphylaxis        Current Medications:   Current Outpatient Medications   Medication Sig Dispense Refill   • albuterol sulfate  (90 Base) MCG/ACT inhaler Inhale 2 puffs Every 6 (Six) Hours As Needed for Wheezing. 6.7 g 2   • ARIPiprazole (ABILIFY) 5 MG tablet Take 1 tablet by mouth Daily. 30 tablet 2   • LORazepam (Ativan) 0.5 MG tablet Take 1 tablet by mouth Daily As Needed for Anxiety. 28 tablet 0   • omeprazole (PrilOSEC) 40 MG capsule Take 1 capsule by mouth Daily. 30 capsule 2   • prazosin (MINIPRESS) 1 MG capsule Take 1 capsule by mouth Every Night. 30 capsule 2   • propranolol (INDERAL) 10 MG tablet Take 1 to 2 tablets PO daily as needed 30 minutes to an hour before events for situational anxiety 60 tablet 0   • psyllium (Metamucil Smooth Texture) 58.6 % powder Take  by mouth 2 (two) times a day. 660 g 11   • venlafaxine XR (Effexor XR) 75 MG 24 hr capsule Take 1 capsule by mouth Daily. 30 capsule 2     No current facility-administered medications for this visit.       Review of Symptoms:    Review of Systems   Constitutional: Negative for activity change and fatigue.   HENT: Negative for congestion and rhinorrhea.    Eyes: Negative for blurred vision and visual disturbance.   Respiratory: Negative for cough and shortness of breath.    Cardiovascular: Negative for chest pain and palpitations.   Gastrointestinal: Negative for nausea and vomiting.   Endocrine: Negative for cold intolerance and heat intolerance.   Genitourinary: Negative for dysuria and frequency.   Musculoskeletal: Negative for arthralgias and myalgias.   Skin: Negative for rash and bruise.   Allergic/Immunologic: Negative for environmental allergies and food allergies.   Neurological: Negative for weakness and confusion.   Hematological: Negative for adenopathy.  "Does not bruise/bleed easily.   Psychiatric/Behavioral: Positive for sleep disturbance and stress. Negative for agitation, decreased concentration, suicidal ideas and depressed mood. The patient is not nervous/anxious.          Physical Exam:   Blood pressure 136/80, pulse 82, height 170.2 cm (67.01\"), weight 85.9 kg (189 lb 6.4 oz), not currently breastfeeding.    Appearance: CF of stated age, NAD   Gait, Station, Strength: WNL    Mental Status Exam:   Hygiene:   good  Cooperation:  Cooperative  Eye Contact:  Good  Psychomotor Behavior:  Appropriate  Affect:  Full range  Mood: normal  Hopelessness: Optimistic  Speech:  Normal  Thought Process:  Goal directed and Linear  Thought Content:  Normal and Mood congruent  Suicidal:  None, resolved  Homicidal:  None  Hallucinations:  None  Delusion:  None  Memory:  Intact  Orientation:  Person, Place, Time and Situation  Reliability:  good  Insight:  Fair  Judgement:  Good  Impulse Control:  Good      Lab Results:   No visits with results within 1 Month(s) from this visit.   Latest known visit with results is:   Office Visit on 10/12/2020   Component Date Value Ref Range Status   • WBC 10/12/2020 5.76  3.40 - 10.80 10*3/mm3 Final   • RBC 10/12/2020 4.87  3.77 - 5.28 10*6/mm3 Final   • Hemoglobin 10/12/2020 14.0  12.0 - 15.9 g/dL Final   • Hematocrit 10/12/2020 41.2  34.0 - 46.6 % Final   • MCV 10/12/2020 84.6  79.0 - 97.0 fL Final   • MCH 10/12/2020 28.7  26.6 - 33.0 pg Final   • MCHC 10/12/2020 34.0  31.5 - 35.7 g/dL Final   • RDW 10/12/2020 14.1  12.3 - 15.4 % Final   • RDW-SD 10/12/2020 43.4  37.0 - 54.0 fl Final   • MPV 10/12/2020 11.2  6.0 - 12.0 fL Final   • Platelets 10/12/2020 204  140 - 450 10*3/mm3 Final   • Neutrophil % 10/12/2020 45.9  42.7 - 76.0 % Final   • Lymphocyte % 10/12/2020 38.0  19.6 - 45.3 % Final   • Monocyte % 10/12/2020 9.5  5.0 - 12.0 % Final   • Eosinophil % 10/12/2020 5.7  0.3 - 6.2 % Final   • Basophil % 10/12/2020 0.7  0.0 - 1.5 % Final   • " Immature Grans % 10/12/2020 0.2  0.0 - 0.5 % Final   • Neutrophils, Absolute 10/12/2020 2.64  1.70 - 7.00 10*3/mm3 Final   • Lymphocytes, Absolute 10/12/2020 2.19  0.70 - 3.10 10*3/mm3 Final   • Monocytes, Absolute 10/12/2020 0.55  0.10 - 0.90 10*3/mm3 Final   • Eosinophils, Absolute 10/12/2020 0.33  0.00 - 0.40 10*3/mm3 Final   • Basophils, Absolute 10/12/2020 0.04  0.00 - 0.20 10*3/mm3 Final   • Immature Grans, Absolute 10/12/2020 0.01  0.00 - 0.05 10*3/mm3 Final   • nRBC 10/12/2020 0.0  0.0 - 0.2 /100 WBC Final   • Glucose 10/12/2020 80  65 - 99 mg/dL Final   • BUN 10/12/2020 13  6 - 20 mg/dL Final   • Creatinine 10/12/2020 0.61  0.57 - 1.00 mg/dL Final   • Sodium 10/12/2020 137  136 - 145 mmol/L Final   • Potassium 10/12/2020 4.2  3.5 - 5.2 mmol/L Final   • Chloride 10/12/2020 102  98 - 107 mmol/L Final   • CO2 10/12/2020 25.3  22.0 - 29.0 mmol/L Final   • Calcium 10/12/2020 9.4  8.6 - 10.5 mg/dL Final   • Total Protein 10/12/2020 7.6  6.0 - 8.5 g/dL Final   • Albumin 10/12/2020 4.40  3.50 - 5.20 g/dL Final   • ALT (SGPT) 10/12/2020 9  1 - 33 U/L Final   • AST (SGOT) 10/12/2020 22  1 - 32 U/L Final   • Alkaline Phosphatase 10/12/2020 46  39 - 117 U/L Final   • Total Bilirubin 10/12/2020 0.3  0.0 - 1.2 mg/dL Final   • eGFR Non African Amer 10/12/2020 107  >60 mL/min/1.73 Final   • Globulin 10/12/2020 3.2  gm/dL Final   • A/G Ratio 10/12/2020 1.4  g/dL Final   • BUN/Creatinine Ratio 10/12/2020 21.3  7.0 - 25.0 Final   • Anion Gap 10/12/2020 9.7  5.0 - 15.0 mmol/L Final   • Ferritin 10/12/2020 38.20  13.00 - 150.00 ng/mL Final   • LDH 10/12/2020 146  135 - 214 U/L Final   • Procalcitonin 10/12/2020 <0.02  0.00 - 0.25 ng/mL Final       Assessment/Plan   Diagnoses and all orders for this visit:    1. Chronic post-traumatic stress disorder (PTSD) (Primary)  -     ARIPiprazole (ABILIFY) 5 MG tablet; Take 1 tablet by mouth Daily.  Dispense: 30 tablet; Refill: 2  -     venlafaxine XR (Effexor XR) 75 MG 24 hr capsule;  Take 1 capsule by mouth Daily.  Dispense: 30 capsule; Refill: 2  -     LORazepam (Ativan) 0.5 MG tablet; Take 1 tablet by mouth Daily As Needed for Anxiety.  Dispense: 28 tablet; Refill: 0    2. AMARIS (generalized anxiety disorder)  -     ARIPiprazole (ABILIFY) 5 MG tablet; Take 1 tablet by mouth Daily.  Dispense: 30 tablet; Refill: 2  -     venlafaxine XR (Effexor XR) 75 MG 24 hr capsule; Take 1 capsule by mouth Daily.  Dispense: 30 capsule; Refill: 2  -     LORazepam (Ativan) 0.5 MG tablet; Take 1 tablet by mouth Daily As Needed for Anxiety.  Dispense: 28 tablet; Refill: 0    3. Social anxiety disorder  -     ARIPiprazole (ABILIFY) 5 MG tablet; Take 1 tablet by mouth Daily.  Dispense: 30 tablet; Refill: 2  -     venlafaxine XR (Effexor XR) 75 MG 24 hr capsule; Take 1 capsule by mouth Daily.  Dispense: 30 capsule; Refill: 2  -     LORazepam (Ativan) 0.5 MG tablet; Take 1 tablet by mouth Daily As Needed for Anxiety.  Dispense: 28 tablet; Refill: 0    4. Methamphetamine use disorder, severe, in sustained remission (HCC)    5. Nicotine use disorder    6. Cannabis abuse, episodic    7. Nightmares associated with chronic post-traumatic stress disorder  -     prazosin (MINIPRESS) 1 MG capsule; Take 1 capsule by mouth Every Night.  Dispense: 30 capsule; Refill: 2    -Patient showing substantial improvement in mood and anxiety symptoms which have largely resolved but she continues to have some issues with sleep, specifically nightmares related to PTSD.  -Reviewed previous available documentation  -Reviewed most recent available labs   -NALINI reviewed and appropriate. Patient counseled on use of controlled substances.   -Continue Abilify 5 mg p.o. nightly for mood stabilization and anxiety  -Continue Effexor XR 75 mg p.o. daily for mood and anxiety  -Continue propranolol 10 to 20 mg p.o. as needed 30 minutes to an hour prior to anxiety provoking event for situational and social anxiety  -Start prazosin 1 mg p.o. nightly for  nightmares related to PTSD  -Continue Ativan 0.5 mg p.o. daily as needed for anxiety.  Advised patient to only take the medication as needed in times of crisis  -Encourage cessation of nicotine  -Encourage cessation of cannabis  -Encouraged continued cessation of methamphetamine  -Encouraged to consider therapy      Visit Diagnoses:    ICD-10-CM ICD-9-CM   1. Chronic post-traumatic stress disorder (PTSD)  F43.12 309.81   2. AMARIS (generalized anxiety disorder)  F41.1 300.02   3. Social anxiety disorder  F40.10 300.23   4. Methamphetamine use disorder, severe, in sustained remission (HCC)  F15.21 305.73   5. Nicotine use disorder  F17.200 305.1   6. Cannabis abuse, episodic  F12.10 305.22   7. Nightmares associated with chronic post-traumatic stress disorder  F51.5 307.47    F43.12 309.81       TREATMENT PLAN/GOALS: Continue supportive psychotherapy efforts and medications as indicated. Treatment and medication options discussed during today's visit. Patient acknowledged and verbally consented to continue with current treatment plan and was educated on the importance of compliance with treatment and follow-up appointments.    MEDICATION ISSUES:    Discussed medication options and treatment plan of prescribed medication as well as the risks, benefits, and side effects including potential falls, possible impaired driving and metabolic adversities among others. Patient is agreeable to call the office with any worsening of symptoms or onset of side effects. Patient is agreeable to call 911 or go to the nearest ER should he/she begin having SI/HI.     MEDS ORDERED DURING VISIT:  New Medications Ordered This Visit   Medications   • ARIPiprazole (ABILIFY) 5 MG tablet     Sig: Take 1 tablet by mouth Daily.     Dispense:  30 tablet     Refill:  2   • venlafaxine XR (Effexor XR) 75 MG 24 hr capsule     Sig: Take 1 capsule by mouth Daily.     Dispense:  30 capsule     Refill:  2   • prazosin (MINIPRESS) 1 MG capsule     Sig: Take  1 capsule by mouth Every Night.     Dispense:  30 capsule     Refill:  2   • LORazepam (Ativan) 0.5 MG tablet     Sig: Take 1 tablet by mouth Daily As Needed for Anxiety.     Dispense:  28 tablet     Refill:  0       Return in about 3 months (around 1/25/2022).             This document has been electronically signed by Giacomo Phipps MD  October 29, 2021 15:02 EDT    Dictated using Dragon Dictation.

## 2021-11-01 ENCOUNTER — LAB (OUTPATIENT)
Dept: FAMILY MEDICINE CLINIC | Facility: CLINIC | Age: 46
End: 2021-11-01

## 2021-11-01 PROCEDURE — 86671 FUNGUS NES ANTIBODY: CPT | Performed by: FAMILY MEDICINE

## 2021-11-01 PROCEDURE — 36415 COLL VENOUS BLD VENIPUNCTURE: CPT | Performed by: FAMILY MEDICINE

## 2021-11-01 PROCEDURE — 80061 LIPID PANEL: CPT | Performed by: FAMILY MEDICINE

## 2021-11-01 PROCEDURE — 83735 ASSAY OF MAGNESIUM: CPT | Performed by: FAMILY MEDICINE

## 2021-11-01 PROCEDURE — 84443 ASSAY THYROID STIM HORMONE: CPT | Performed by: FAMILY MEDICINE

## 2021-11-01 PROCEDURE — 86256 FLUORESCENT ANTIBODY TITER: CPT | Performed by: FAMILY MEDICINE

## 2021-11-01 PROCEDURE — 85025 COMPLETE CBC W/AUTO DIFF WBC: CPT | Performed by: FAMILY MEDICINE

## 2021-11-01 PROCEDURE — 80053 COMPREHEN METABOLIC PANEL: CPT | Performed by: FAMILY MEDICINE

## 2021-11-02 LAB
ALBUMIN SERPL-MCNC: 4.2 G/DL (ref 3.5–5.2)
ALBUMIN/GLOB SERPL: 1.4 G/DL
ALP SERPL-CCNC: 55 U/L (ref 39–117)
ALT SERPL W P-5'-P-CCNC: 9 U/L (ref 1–33)
ANION GAP SERPL CALCULATED.3IONS-SCNC: 9.8 MMOL/L (ref 5–15)
AST SERPL-CCNC: 26 U/L (ref 1–32)
BASOPHILS # BLD AUTO: 0.06 10*3/MM3 (ref 0–0.2)
BASOPHILS NFR BLD AUTO: 1.1 % (ref 0–1.5)
BILIRUB SERPL-MCNC: 0.2 MG/DL (ref 0–1.2)
BUN SERPL-MCNC: 12 MG/DL (ref 6–20)
BUN/CREAT SERPL: 23.1 (ref 7–25)
CALCIUM SPEC-SCNC: 9.4 MG/DL (ref 8.6–10.5)
CHLORIDE SERPL-SCNC: 106 MMOL/L (ref 98–107)
CHOLEST SERPL-MCNC: 162 MG/DL (ref 0–200)
CO2 SERPL-SCNC: 22.2 MMOL/L (ref 22–29)
CREAT SERPL-MCNC: 0.52 MG/DL (ref 0.57–1)
DEPRECATED RDW RBC AUTO: 45.8 FL (ref 37–54)
EOSINOPHIL # BLD AUTO: 0.38 10*3/MM3 (ref 0–0.4)
EOSINOPHIL NFR BLD AUTO: 7.1 % (ref 0.3–6.2)
ERYTHROCYTE [DISTWIDTH] IN BLOOD BY AUTOMATED COUNT: 13.7 % (ref 12.3–15.4)
GFR SERPL CREATININE-BSD FRML MDRD: 128 ML/MIN/1.73
GLOBULIN UR ELPH-MCNC: 2.9 GM/DL
GLUCOSE SERPL-MCNC: 93 MG/DL (ref 65–99)
HCT VFR BLD AUTO: 41 % (ref 34–46.6)
HDLC SERPL-MCNC: 38 MG/DL (ref 40–60)
HGB BLD-MCNC: 13 G/DL (ref 12–15.9)
IMM GRANULOCYTES # BLD AUTO: 0.01 10*3/MM3 (ref 0–0.05)
IMM GRANULOCYTES NFR BLD AUTO: 0.2 % (ref 0–0.5)
LDLC SERPL CALC-MCNC: 101 MG/DL (ref 0–100)
LDLC/HDLC SERPL: 2.59 {RATIO}
LYMPHOCYTES # BLD AUTO: 2.28 10*3/MM3 (ref 0.7–3.1)
LYMPHOCYTES NFR BLD AUTO: 42.7 % (ref 19.6–45.3)
MAGNESIUM SERPL-MCNC: 1.9 MG/DL (ref 1.6–2.6)
MCH RBC QN AUTO: 28.8 PG (ref 26.6–33)
MCHC RBC AUTO-ENTMCNC: 31.7 G/DL (ref 31.5–35.7)
MCV RBC AUTO: 90.7 FL (ref 79–97)
MONOCYTES # BLD AUTO: 0.46 10*3/MM3 (ref 0.1–0.9)
MONOCYTES NFR BLD AUTO: 8.6 % (ref 5–12)
NEUTROPHILS NFR BLD AUTO: 2.15 10*3/MM3 (ref 1.7–7)
NEUTROPHILS NFR BLD AUTO: 40.3 % (ref 42.7–76)
NRBC BLD AUTO-RTO: 0 /100 WBC (ref 0–0.2)
PLATELET # BLD AUTO: 245 10*3/MM3 (ref 140–450)
PMV BLD AUTO: 10.8 FL (ref 6–12)
POTASSIUM SERPL-SCNC: 4.6 MMOL/L (ref 3.5–5.2)
PROT SERPL-MCNC: 7.1 G/DL (ref 6–8.5)
RBC # BLD AUTO: 4.52 10*6/MM3 (ref 3.77–5.28)
SODIUM SERPL-SCNC: 138 MMOL/L (ref 136–145)
TRIGL SERPL-MCNC: 128 MG/DL (ref 0–150)
TSH SERPL DL<=0.05 MIU/L-ACNC: 4.52 UIU/ML (ref 0.27–4.2)
VLDLC SERPL-MCNC: 23 MG/DL (ref 5–40)
WBC # BLD AUTO: 5.34 10*3/MM3 (ref 3.4–10.8)

## 2021-11-03 LAB
BAKER'S YEAST IGA QN: <20 UNITS (ref 0–24.9)
BAKER'S YEAST IGG QN: <20 UNITS (ref 0–24.9)
P-ANCA ATYPICAL TITR SER IF: NORMAL TITER

## 2021-11-05 ENCOUNTER — LAB (OUTPATIENT)
Dept: LAB | Facility: HOSPITAL | Age: 46
End: 2021-11-05

## 2021-11-05 ENCOUNTER — TELEPHONE (OUTPATIENT)
Dept: FAMILY MEDICINE CLINIC | Facility: CLINIC | Age: 46
End: 2021-11-05

## 2021-11-05 DIAGNOSIS — K59.1 FUNCTIONAL DIARRHEA: ICD-10-CM

## 2021-11-05 LAB
ADV 40+41 DNA STL QL NAA+NON-PROBE: NOT DETECTED
ASTRO TYP 1-8 RNA STL QL NAA+NON-PROBE: NOT DETECTED
C CAYETANENSIS DNA STL QL NAA+NON-PROBE: NOT DETECTED
C COLI+JEJ+UPSA DNA STL QL NAA+NON-PROBE: NOT DETECTED
CRYPTOSP DNA STL QL NAA+NON-PROBE: NOT DETECTED
E HISTOLYT DNA STL QL NAA+NON-PROBE: NOT DETECTED
EAEC PAA PLAS AGGR+AATA ST NAA+NON-PRB: NOT DETECTED
EC STX1+STX2 GENES STL QL NAA+NON-PROBE: NOT DETECTED
EPEC EAE GENE STL QL NAA+NON-PROBE: DETECTED
ETEC LTA+ST1A+ST1B TOX ST NAA+NON-PROBE: NOT DETECTED
G LAMBLIA DNA STL QL NAA+NON-PROBE: NOT DETECTED
NOROVIRUS GI+II RNA STL QL NAA+NON-PROBE: NOT DETECTED
P SHIGELLOIDES DNA STL QL NAA+NON-PROBE: NOT DETECTED
RVA RNA STL QL NAA+NON-PROBE: NOT DETECTED
S ENT+BONG DNA STL QL NAA+NON-PROBE: NOT DETECTED
SAPO I+II+IV+V RNA STL QL NAA+NON-PROBE: NOT DETECTED
SHIGELLA SP+EIEC IPAH ST NAA+NON-PROBE: NOT DETECTED
V CHOL+PARA+VUL DNA STL QL NAA+NON-PROBE: NOT DETECTED
V CHOLERAE DNA STL QL NAA+NON-PROBE: NOT DETECTED
Y ENTEROCOL DNA STL QL NAA+NON-PROBE: NOT DETECTED

## 2021-11-05 PROCEDURE — 87046 STOOL CULTR AEROBIC BACT EA: CPT

## 2021-11-05 PROCEDURE — 87209 SMEAR COMPLEX STAIN: CPT

## 2021-11-05 PROCEDURE — 87177 OVA AND PARASITES SMEARS: CPT

## 2021-11-05 PROCEDURE — 0097U HC BIOFIRE FILMARRAY GI PANEL: CPT

## 2021-11-05 PROCEDURE — 87045 FECES CULTURE AEROBIC BACT: CPT

## 2021-11-05 PROCEDURE — 87427 SHIGA-LIKE TOXIN AG IA: CPT

## 2021-11-05 NOTE — TELEPHONE ENCOUNTER
----- Message from Salome Li MD sent at 11/5/2021 12:56 AM EDT -----  Please call [facility]. Labs are okay. I'm afraid she is going to have to do the stool samples. Her thyroid may be a little off - has she ever been diagnosed with a thyroid problem? May put more labs in for her to do when she returns the stool samples. Also, I know I've sent her to the hepatitis C clinic, but I think that was during the pandemic. Did she ever go and complete treatment? Thanks.      Left a message to return call.    Left a second message to return call.    Spoke with patient she reports she took her stool samples to the Hospital a few days ago,never been told she has any thyroid problems,did not go to the Hep. C Clinic but would now like another referral & for an appointment to be scheduled.

## 2021-11-08 LAB
O+P SPEC MICRO: NORMAL
O+P STL CONC: NORMAL

## 2021-11-09 LAB
BACTERIA SPEC CULT: NORMAL
BACTERIA SPEC CULT: NORMAL
CAMPYLOBACTER STL CULT: NORMAL
E COLI SXT STL QL IA: NEGATIVE
SALM + SHIG STL CULT: NORMAL

## 2021-11-12 ENCOUNTER — TELEPHONE (OUTPATIENT)
Dept: FAMILY MEDICINE CLINIC | Facility: CLINIC | Age: 46
End: 2021-11-12

## 2021-11-12 NOTE — TELEPHONE ENCOUNTER
Pt called wanted stool sample results. Pt stated she is unable to control bowels. She does not have diarrhea, it is solid bowel movement.

## 2021-11-15 ENCOUNTER — TELEPHONE (OUTPATIENT)
Dept: FAMILY MEDICINE CLINIC | Facility: CLINIC | Age: 46
End: 2021-11-15

## 2021-11-15 NOTE — TELEPHONE ENCOUNTER
Her stool samples came back positive for EPEC, which is a form of E. Coli. It should be self-limited. Please clarify. So now she is having solid bowel movements, but they are not controlled? Does that mean she is leaving stool in her underwear and has leakage? Does that mean she is rushing to stool all the time? Does she know when she is stooling? If she is having solid stool, this issue is not really the E. Coli.

## 2021-11-16 DIAGNOSIS — R15.9 INCONTINENCE OF FECES, UNSPECIFIED FECAL INCONTINENCE TYPE: Primary | ICD-10-CM

## 2021-11-16 NOTE — TELEPHONE ENCOUNTER
Does she think that she has been pooping more since her 10/12 xray? It showed constipation then. I wonder if it is a poop issue vs a sphincter function issue. If it is a sphincter function issue, then it would have to be a colonoscopy to make sure. I'm hoping to avoid that need with her. I think she may need another xray to compare to the 10/12 one. Would she come in for it?

## 2021-11-16 NOTE — TELEPHONE ENCOUNTER
"Her stool samples came back positive for EPEC, which is a form of E. Coli. It should be self-limited. Please clarify. So now she is having solid bowel movements, but they are not controlled? Does that mean she is leaving stool in her underwear and has leakage? Does that mean she is rushing to stool all the time? Does she know when she is stooling? If she is having solid stool, this issue is not really the E. Coli.    Spoke with patient she reports she will have \"round balls of Stool\" & will not even know it until they fall out of her underwear & she feels it,does not feel her body expel them,sometimes it will be \"smears\" in her underwear  & she will not know it either  "

## 2021-11-16 NOTE — TELEPHONE ENCOUNTER
Does she think that she has been pooping more since her 10/12 xray? It showed constipation then. I wonder if it is a poop issue vs a sphincter function issue. If it is a sphincter function issue, then it would have to be a colonoscopy to make sure. I'm hoping to avoid that need with her. I think she may need another xray to compare to the 10/12 one. Would she come in for it?    Spoke with patient & she verbalized understanding,she reports her BM'S are about the same since XR,she reports that she does use suppositories to have a BM pretty often ,is willing to go for a repeat XR.([facility] is off so she will have to go to the [facility] or hospital).

## 2021-11-24 DIAGNOSIS — F43.12 NIGHTMARES ASSOCIATED WITH CHRONIC POST-TRAUMATIC STRESS DISORDER: ICD-10-CM

## 2021-11-24 DIAGNOSIS — F51.5 NIGHTMARES ASSOCIATED WITH CHRONIC POST-TRAUMATIC STRESS DISORDER: ICD-10-CM

## 2021-11-24 RX ORDER — PRAZOSIN HYDROCHLORIDE 1 MG/1
1 CAPSULE ORAL NIGHTLY
Qty: 30 CAPSULE | Refills: 2 | Status: ON HOLD | OUTPATIENT
Start: 2021-11-24 | End: 2022-03-07

## 2022-01-10 ENCOUNTER — OFFICE VISIT (OUTPATIENT)
Dept: FAMILY MEDICINE CLINIC | Facility: CLINIC | Age: 47
End: 2022-01-10

## 2022-01-10 VITALS
BODY MASS INDEX: 31.04 KG/M2 | OXYGEN SATURATION: 96 % | SYSTOLIC BLOOD PRESSURE: 110 MMHG | WEIGHT: 197.8 LBS | HEART RATE: 79 BPM | DIASTOLIC BLOOD PRESSURE: 78 MMHG | TEMPERATURE: 96.9 F | HEIGHT: 67 IN

## 2022-01-10 DIAGNOSIS — R79.89 ELEVATED TSH: ICD-10-CM

## 2022-01-10 DIAGNOSIS — R76.8 HEPATITIS C ANTIBODY POSITIVE IN BLOOD: ICD-10-CM

## 2022-01-10 DIAGNOSIS — R35.0 FREQUENCY OF MICTURITION: ICD-10-CM

## 2022-01-10 DIAGNOSIS — N30.90 CYSTITIS: Primary | ICD-10-CM

## 2022-01-10 LAB
BILIRUB BLD-MCNC: NEGATIVE MG/DL
CLARITY, POC: ABNORMAL
COLOR UR: YELLOW
EXPIRATION DATE: ABNORMAL
GLUCOSE UR STRIP-MCNC: NEGATIVE MG/DL
KETONES UR QL: NEGATIVE
LEUKOCYTE EST, POC: ABNORMAL
Lab: ABNORMAL
NITRITE UR-MCNC: POSITIVE MG/ML
PH UR: 6 [PH] (ref 5–8)
PROT UR STRIP-MCNC: NEGATIVE MG/DL
RBC # UR STRIP: ABNORMAL /UL
SP GR UR: 1.03 (ref 1–1.03)
UROBILINOGEN UR QL: NORMAL

## 2022-01-10 PROCEDURE — 81003 URINALYSIS AUTO W/O SCOPE: CPT | Performed by: FAMILY MEDICINE

## 2022-01-10 PROCEDURE — 99214 OFFICE O/P EST MOD 30 MIN: CPT | Performed by: FAMILY MEDICINE

## 2022-01-10 RX ORDER — NITROFURANTOIN 25; 75 MG/1; MG/1
100 CAPSULE ORAL 2 TIMES DAILY
Qty: 10 CAPSULE | Refills: 0 | Status: ON HOLD | OUTPATIENT
Start: 2022-01-10 | End: 2022-03-07

## 2022-01-10 NOTE — PROGRESS NOTES
Subjective   Meghan Rivas is a 46 y.o. female.   Pt presents today with CC of Urinary Tract Infection (burning and frequency )      History of Present Illness   1.  Patient is a 46-year-old female who reports 3 days of burning with urination.  She has had similar symptoms in the past with urinary tract infections.  She denies flank pain or fevers.  #2 she had elevated TSH 3 months ago on blood work, it was borderline.  She is agreeable to recheck today.  #3 she had positive hepatitis C antibody in the past.  She had been referred twice to hepatitis C clinic, each time she did not show up.  She is willing to go for treatment at this time.  She is agreeable to blood work.             The following portions of the patient's history were reviewed and updated as appropriate: allergies, current medications, past family history, past medical history, past social history, past surgical history and problem list.    Review of Systems   Constitutional: Negative for chills, fever and unexpected weight loss.   HENT: Negative for congestion and sore throat.    Eyes: Negative for blurred vision and visual disturbance.   Respiratory: Negative for cough and wheezing.    Cardiovascular: Negative for chest pain and palpitations.   Gastrointestinal: Negative for abdominal pain and diarrhea.   Endocrine: Negative for cold intolerance and heat intolerance.   Genitourinary: Negative for dysuria.   Musculoskeletal: Negative for arthralgias and neck stiffness.   Neurological: Negative for dizziness, seizures and syncope.   Psychiatric/Behavioral: Negative for self-injury, suicidal ideas and depressed mood.       Objective   Physical Exam  Vitals and nursing note reviewed.   Constitutional:       Appearance: She is well-developed.   HENT:      Head: Normocephalic and atraumatic.      Right Ear: External ear normal.      Left Ear: External ear normal.      Nose: Nose normal.   Eyes:      Conjunctiva/sclera: Conjunctivae normal.      Pupils:  Pupils are equal, round, and reactive to light.   Cardiovascular:      Rate and Rhythm: Normal rate and regular rhythm.      Heart sounds: Normal heart sounds.   Pulmonary:      Effort: Pulmonary effort is normal.      Breath sounds: Normal breath sounds.   Abdominal:      General: Bowel sounds are normal.      Palpations: Abdomen is soft.   Musculoskeletal:      Cervical back: Normal range of motion and neck supple.   Skin:     General: Skin is warm and dry.   Neurological:      Mental Status: She is alert and oriented to person, place, and time.   Psychiatric:         Behavior: Behavior normal.           Assessment/Plan   Diagnoses and all orders for this visit:    1. Cystitis (Primary)  -     nitrofurantoin, macrocrystal-monohydrate, (Macrobid) 100 MG capsule; Take 1 capsule by mouth 2 (Two) Times a Day.  Dispense: 10 capsule; Refill: 0  Point-of-care urinalysis confirms UTI.  As this is the first UTI she has had in some time, will treat with nitrofurantoin, holding off on culture.  Follow-up as needed.  2. Elevated TSH  -     Comprehensive Metabolic Panel; Future  -     CBC Auto Differential; Future  -     TSH; Future  -     T4, Free; Future  We will recheck thyroid labs.  3. Hepatitis C antibody positive in blood  -     Comprehensive Metabolic Panel; Future  -     CBC Auto Differential; Future  -     Hepatitis C RNA, Quantitative, PCR (graph); Future  We will confirm that she has viral load again, if so, we will send a third referral to hepatitis C clinic.               Meghan Rivas  reports that she has been smoking cigarettes. She has a 0.88 pack-year smoking history. She has never used smokeless tobacco.. I have educated her on the risk of diseases from using tobacco products such as cancer, COPD and heart disease.     I advised her to quit and she is not willing to quit.    I spent 3  minutes counseling the patient.         Patient's Body mass index is 30.97 kg/m². indicating that she is obese (BMI >30).  Obesity-related health conditions include the following: GERD. Obesity is worsening. BMI is is above average; BMI management plan is completed. We discussed portion control and increasing exercise..

## 2022-01-18 DIAGNOSIS — F41.1 GAD (GENERALIZED ANXIETY DISORDER): ICD-10-CM

## 2022-01-18 DIAGNOSIS — F40.10 SOCIAL ANXIETY DISORDER: ICD-10-CM

## 2022-01-18 DIAGNOSIS — F43.12 CHRONIC POST-TRAUMATIC STRESS DISORDER (PTSD): ICD-10-CM

## 2022-01-18 RX ORDER — LORAZEPAM 0.5 MG/1
0.5 TABLET ORAL DAILY PRN
Qty: 28 TABLET | Refills: 0 | Status: SHIPPED | OUTPATIENT
Start: 2022-01-18 | End: 2022-03-25 | Stop reason: SDUPTHER

## 2022-01-18 RX ORDER — ARIPIPRAZOLE 5 MG/1
5 TABLET ORAL DAILY
Qty: 30 TABLET | Refills: 2 | Status: ON HOLD | OUTPATIENT
Start: 2022-01-18 | End: 2022-03-07 | Stop reason: DRUGHIGH

## 2022-01-19 DIAGNOSIS — F43.12 CHRONIC POST-TRAUMATIC STRESS DISORDER (PTSD): ICD-10-CM

## 2022-01-19 DIAGNOSIS — F40.10 SOCIAL ANXIETY DISORDER: ICD-10-CM

## 2022-01-19 DIAGNOSIS — F41.1 GAD (GENERALIZED ANXIETY DISORDER): ICD-10-CM

## 2022-01-19 RX ORDER — VENLAFAXINE HYDROCHLORIDE 75 MG/1
75 CAPSULE, EXTENDED RELEASE ORAL DAILY
Qty: 30 CAPSULE | Refills: 2 | Status: ON HOLD | OUTPATIENT
Start: 2022-01-19 | End: 2022-03-07 | Stop reason: DRUGHIGH

## 2022-01-27 DIAGNOSIS — F31.62 BIPOLAR DISORDER, CURRENT EPISODE MIXED, MODERATE: ICD-10-CM

## 2022-01-27 DIAGNOSIS — R76.8 HEPATITIS C ANTIBODY POSITIVE IN BLOOD: Primary | ICD-10-CM

## 2022-01-27 DIAGNOSIS — N30.90 CYSTITIS: ICD-10-CM

## 2022-01-27 DIAGNOSIS — R79.89 ELEVATED TSH: ICD-10-CM

## 2022-02-15 ENCOUNTER — LAB (OUTPATIENT)
Dept: FAMILY MEDICINE CLINIC | Facility: CLINIC | Age: 47
End: 2022-02-15

## 2022-02-15 DIAGNOSIS — N30.90 CYSTITIS: ICD-10-CM

## 2022-02-15 DIAGNOSIS — R79.89 ELEVATED TSH: ICD-10-CM

## 2022-02-15 DIAGNOSIS — F31.62 BIPOLAR DISORDER, CURRENT EPISODE MIXED, MODERATE: ICD-10-CM

## 2022-02-15 DIAGNOSIS — R76.8 HEPATITIS C ANTIBODY POSITIVE IN BLOOD: ICD-10-CM

## 2022-02-15 PROCEDURE — 87086 URINE CULTURE/COLONY COUNT: CPT | Performed by: FAMILY MEDICINE

## 2022-02-15 PROCEDURE — 87902 NFCT AGT GNTYP ALYS HEP C: CPT | Performed by: FAMILY MEDICINE

## 2022-02-15 PROCEDURE — 80053 COMPREHEN METABOLIC PANEL: CPT | Performed by: FAMILY MEDICINE

## 2022-02-15 PROCEDURE — 85025 COMPLETE CBC W/AUTO DIFF WBC: CPT | Performed by: FAMILY MEDICINE

## 2022-02-15 PROCEDURE — 84443 ASSAY THYROID STIM HORMONE: CPT | Performed by: FAMILY MEDICINE

## 2022-02-15 PROCEDURE — 87522 HEPATITIS C REVRS TRNSCRPJ: CPT | Performed by: FAMILY MEDICINE

## 2022-02-15 PROCEDURE — 36415 COLL VENOUS BLD VENIPUNCTURE: CPT

## 2022-02-15 PROCEDURE — 84439 ASSAY OF FREE THYROXINE: CPT | Performed by: FAMILY MEDICINE

## 2022-02-16 LAB
ALBUMIN SERPL-MCNC: 4.6 G/DL (ref 3.5–5.2)
ALBUMIN/GLOB SERPL: 1.1 G/DL
ALP SERPL-CCNC: 57 U/L (ref 39–117)
ALT SERPL W P-5'-P-CCNC: 17 U/L (ref 1–33)
ANION GAP SERPL CALCULATED.3IONS-SCNC: 12 MMOL/L (ref 5–15)
AST SERPL-CCNC: 36 U/L (ref 1–32)
BACTERIA SPEC AEROBE CULT: NORMAL
BASOPHILS # BLD AUTO: 0.04 10*3/MM3 (ref 0–0.2)
BASOPHILS NFR BLD AUTO: 0.6 % (ref 0–1.5)
BILIRUB SERPL-MCNC: 0.4 MG/DL (ref 0–1.2)
BUN SERPL-MCNC: 14 MG/DL (ref 6–20)
BUN/CREAT SERPL: 21.9 (ref 7–25)
CALCIUM SPEC-SCNC: 9.7 MG/DL (ref 8.6–10.5)
CHLORIDE SERPL-SCNC: 102 MMOL/L (ref 98–107)
CO2 SERPL-SCNC: 23 MMOL/L (ref 22–29)
CREAT SERPL-MCNC: 0.64 MG/DL (ref 0.57–1)
DEPRECATED RDW RBC AUTO: 43.3 FL (ref 37–54)
EOSINOPHIL # BLD AUTO: 0.27 10*3/MM3 (ref 0–0.4)
EOSINOPHIL NFR BLD AUTO: 4.1 % (ref 0.3–6.2)
ERYTHROCYTE [DISTWIDTH] IN BLOOD BY AUTOMATED COUNT: 13.6 % (ref 12.3–15.4)
GFR SERPL CREATININE-BSD FRML MDRD: 100 ML/MIN/1.73
GLOBULIN UR ELPH-MCNC: 4.2 GM/DL
GLUCOSE SERPL-MCNC: 73 MG/DL (ref 65–99)
HCT VFR BLD AUTO: 43.2 % (ref 34–46.6)
HGB BLD-MCNC: 14.6 G/DL (ref 12–15.9)
IMM GRANULOCYTES # BLD AUTO: 0.01 10*3/MM3 (ref 0–0.05)
IMM GRANULOCYTES NFR BLD AUTO: 0.2 % (ref 0–0.5)
LYMPHOCYTES # BLD AUTO: 3.09 10*3/MM3 (ref 0.7–3.1)
LYMPHOCYTES NFR BLD AUTO: 47 % (ref 19.6–45.3)
MCH RBC QN AUTO: 29.4 PG (ref 26.6–33)
MCHC RBC AUTO-ENTMCNC: 33.8 G/DL (ref 31.5–35.7)
MCV RBC AUTO: 86.9 FL (ref 79–97)
MONOCYTES # BLD AUTO: 0.61 10*3/MM3 (ref 0.1–0.9)
MONOCYTES NFR BLD AUTO: 9.3 % (ref 5–12)
NEUTROPHILS NFR BLD AUTO: 2.56 10*3/MM3 (ref 1.7–7)
NEUTROPHILS NFR BLD AUTO: 38.8 % (ref 42.7–76)
NRBC BLD AUTO-RTO: 0 /100 WBC (ref 0–0.2)
PLATELET # BLD AUTO: 256 10*3/MM3 (ref 140–450)
PMV BLD AUTO: 10.1 FL (ref 6–12)
POTASSIUM SERPL-SCNC: 4 MMOL/L (ref 3.5–5.2)
PROT SERPL-MCNC: 8.8 G/DL (ref 6–8.5)
RBC # BLD AUTO: 4.97 10*6/MM3 (ref 3.77–5.28)
SODIUM SERPL-SCNC: 137 MMOL/L (ref 136–145)
T4 FREE SERPL-MCNC: 0.83 NG/DL (ref 0.93–1.7)
TSH SERPL DL<=0.05 MIU/L-ACNC: 4.23 UIU/ML (ref 0.27–4.2)
WBC NRBC COR # BLD: 6.58 10*3/MM3 (ref 3.4–10.8)

## 2022-02-18 ENCOUNTER — TELEPHONE (OUTPATIENT)
Dept: FAMILY MEDICINE CLINIC | Facility: CLINIC | Age: 47
End: 2022-02-18

## 2022-02-18 DIAGNOSIS — E03.9 ACQUIRED HYPOTHYROIDISM: Primary | ICD-10-CM

## 2022-02-18 DIAGNOSIS — E03.9 ACQUIRED HYPOTHYROIDISM: ICD-10-CM

## 2022-02-18 LAB
HCV RNA SERPL NAA+PROBE-ACNC: NORMAL IU/ML
HCV RNA SERPL NAA+PROBE-LOG IU: 5.24 LOG10 IU/ML
TEST INFORMATION: NORMAL

## 2022-02-18 RX ORDER — LEVOTHYROXINE SODIUM 0.05 MG/1
50 TABLET ORAL
Qty: 60 TABLET | Refills: 0 | Status: SHIPPED | OUTPATIENT
Start: 2022-02-18 | End: 2022-07-18 | Stop reason: SDUPTHER

## 2022-02-18 RX ORDER — LEVOTHYROXINE SODIUM 0.05 MG/1
50 TABLET ORAL
Qty: 60 TABLET | Refills: 0 | Status: SHIPPED | OUTPATIENT
Start: 2022-02-18 | End: 2022-02-18 | Stop reason: SDUPTHER

## 2022-02-18 NOTE — TELEPHONE ENCOUNTER
----- Message from Rizwan Bailey DO sent at 2/18/2022 10:33 AM EST -----  I reviewed your thyroid labs.  3 months ago your TSH was a little elevated, this suggests that your thyroid is underfunctioning slightly.  We rechecked and your level came back at the upper limit, this suggests again, that you are borderline hypothyroid.  At this point, starting a small dose of levothyroxine 50 mcg daily may be beneficial.  Also, it would be reasonable to hold off for now and we could keep an eye on it.  Lastly, discussing with Dr. Li at your next appointment would be reasonable as well.    Patient would like to start Levothyroxine and has scheduled an appointment with Dr Li.     Esau Hong.

## 2022-02-18 NOTE — TELEPHONE ENCOUNTER
Sent.  Please educate on proper administration.  It is best to take this medication on an empty stomach with water 30 minutes to an hour before breakfast(the best she can is good enough)

## 2022-02-18 NOTE — TELEPHONE ENCOUNTER
----- Message from Rizwan Bailey, DO sent at 2/18/2022 10:33 AM EST -----  I reviewed your thyroid labs.  3 months ago your TSH was a little elevated, this suggests that your thyroid is underfunctioning slightly.  We rechecked and your level came back at the upper limit, this suggests again, that you are borderline hypothyroid.  At this point, starting a small dose of levothyroxine 50 mcg daily may be beneficial.  Also, it would be reasonable to hold off for now and we could keep an eye on it.  Lastly, discussing with Dr. Li at your next appointment would be reasonable as well.        Left a message to return call.

## 2022-02-22 ENCOUNTER — TELEPHONE (OUTPATIENT)
Dept: FAMILY MEDICINE CLINIC | Facility: CLINIC | Age: 47
End: 2022-02-22

## 2022-02-22 LAB
HCV GENTYP SERPL NAA+PROBE: NORMAL
LABORATORY COMMENT REPORT: NORMAL

## 2022-02-22 NOTE — TELEPHONE ENCOUNTER
----- Message from Rizwan Bailey, DO sent at 2/22/2022  8:06 AM EST -----  Your hepatitis C viral load came back positive.  You still have active hepatitis C.  We could consider sending you to hepatitis C clinic again.  If we do, you will need to keep the appointment this time.

## 2022-02-22 NOTE — PROGRESS NOTES
Your hepatitis C viral load came back positive.  You still have active hepatitis C.  We could consider sending you to hepatitis C clinic again.  If we do, you will need to keep the appointment this time.

## 2022-02-22 NOTE — TELEPHONE ENCOUNTER
Patient notified and verbalized understanding. Patient says that she would like for us to make her another apt with the Hep C clinic but it has to be on a Tuesday.

## 2022-02-23 DIAGNOSIS — B18.2 CHRONIC HEPATITIS C WITHOUT HEPATIC COMA: Primary | ICD-10-CM

## 2022-03-07 ENCOUNTER — OFFICE VISIT (OUTPATIENT)
Dept: FAMILY MEDICINE CLINIC | Facility: CLINIC | Age: 47
End: 2022-03-07

## 2022-03-07 ENCOUNTER — APPOINTMENT (OUTPATIENT)
Dept: CT IMAGING | Facility: HOSPITAL | Age: 47
End: 2022-03-07

## 2022-03-07 ENCOUNTER — HOSPITAL ENCOUNTER (OUTPATIENT)
Facility: HOSPITAL | Age: 47
Discharge: HOME | End: 2022-03-08
Attending: EMERGENCY MEDICINE | Admitting: SURGERY

## 2022-03-07 VITALS
WEIGHT: 191.6 LBS | TEMPERATURE: 97.8 F | OXYGEN SATURATION: 99 % | HEART RATE: 70 BPM | DIASTOLIC BLOOD PRESSURE: 78 MMHG | HEIGHT: 67 IN | BODY MASS INDEX: 30.07 KG/M2 | SYSTOLIC BLOOD PRESSURE: 116 MMHG

## 2022-03-07 DIAGNOSIS — K35.30 ACUTE APPENDICITIS WITH LOCALIZED PERITONITIS, WITHOUT PERFORATION, ABSCESS, OR GANGRENE: Primary | ICD-10-CM

## 2022-03-07 DIAGNOSIS — N23 KIDNEY PAIN: Primary | ICD-10-CM

## 2022-03-07 DIAGNOSIS — K35.30 ACUTE APPENDICITIS WITH LOCALIZED PERITONITIS, UNSPECIFIED WHETHER ABSCESS PRESENT, UNSPECIFIED WHETHER GANGRENE PRESENT, UNSPECIFIED WHETHER PERFORATION PRESENT: ICD-10-CM

## 2022-03-07 PROBLEM — K35.80 ACUTE APPENDICITIS: Status: ACTIVE | Noted: 2022-03-07

## 2022-03-07 LAB
ALBUMIN SERPL-MCNC: 4.16 G/DL (ref 3.5–5.2)
ALBUMIN/GLOB SERPL: 1.1 G/DL
ALP SERPL-CCNC: 51 U/L (ref 39–117)
ALT SERPL W P-5'-P-CCNC: 10 U/L (ref 1–33)
ANION GAP SERPL CALCULATED.3IONS-SCNC: 12 MMOL/L (ref 5–15)
AST SERPL-CCNC: 25 U/L (ref 1–32)
B-HCG UR QL: NEGATIVE
B-HCG UR QL: NEGATIVE
BASOPHILS # BLD AUTO: 0.05 10*3/MM3 (ref 0–0.2)
BASOPHILS NFR BLD AUTO: 0.7 % (ref 0–1.5)
BILIRUB BLD-MCNC: NEGATIVE MG/DL
BILIRUB SERPL-MCNC: 0.4 MG/DL (ref 0–1.2)
BILIRUB UR QL STRIP: NEGATIVE
BUN SERPL-MCNC: 12 MG/DL (ref 6–20)
BUN/CREAT SERPL: 17.1 (ref 7–25)
CALCIUM SPEC-SCNC: 9.4 MG/DL (ref 8.6–10.5)
CHLORIDE SERPL-SCNC: 105 MMOL/L (ref 98–107)
CLARITY UR: CLEAR
CLARITY, POC: CLEAR
CO2 SERPL-SCNC: 22 MMOL/L (ref 22–29)
COLOR UR: YELLOW
COLOR UR: YELLOW
CREAT SERPL-MCNC: 0.7 MG/DL (ref 0.57–1)
CRP SERPL-MCNC: 3.3 MG/DL (ref 0–0.5)
D-LACTATE SERPL-SCNC: 0.8 MMOL/L (ref 0.5–2)
DEPRECATED RDW RBC AUTO: 44.4 FL (ref 37–54)
EGFRCR SERPLBLD CKD-EPI 2021: 108.2 ML/MIN/1.73
EOSINOPHIL # BLD AUTO: 0.17 10*3/MM3 (ref 0–0.4)
EOSINOPHIL NFR BLD AUTO: 2.4 % (ref 0.3–6.2)
ERYTHROCYTE [DISTWIDTH] IN BLOOD BY AUTOMATED COUNT: 13.9 % (ref 12.3–15.4)
EXPIRATION DATE: ABNORMAL
EXPIRATION DATE: NORMAL
FLUAV SUBTYP SPEC NAA+PROBE: NOT DETECTED
FLUBV RNA ISLT QL NAA+PROBE: NOT DETECTED
GLOBULIN UR ELPH-MCNC: 3.9 GM/DL
GLUCOSE SERPL-MCNC: 103 MG/DL (ref 65–99)
GLUCOSE UR STRIP-MCNC: NEGATIVE MG/DL
GLUCOSE UR STRIP-MCNC: NEGATIVE MG/DL
HCT VFR BLD AUTO: 39.2 % (ref 34–46.6)
HGB BLD-MCNC: 13.2 G/DL (ref 12–15.9)
HGB UR QL STRIP.AUTO: NEGATIVE
HOLD SPECIMEN: NORMAL
HOLD SPECIMEN: NORMAL
IMM GRANULOCYTES # BLD AUTO: 0.01 10*3/MM3 (ref 0–0.05)
IMM GRANULOCYTES NFR BLD AUTO: 0.1 % (ref 0–0.5)
INTERNAL NEGATIVE CONTROL: NORMAL
INTERNAL POSITIVE CONTROL: NORMAL
KETONES UR QL STRIP: NEGATIVE
KETONES UR QL: NEGATIVE
LEUKOCYTE EST, POC: NEGATIVE
LEUKOCYTE ESTERASE UR QL STRIP.AUTO: NEGATIVE
LIPASE SERPL-CCNC: 15 U/L (ref 13–60)
LYMPHOCYTES # BLD AUTO: 3.17 10*3/MM3 (ref 0.7–3.1)
LYMPHOCYTES NFR BLD AUTO: 45.1 % (ref 19.6–45.3)
Lab: ABNORMAL
Lab: NORMAL
MAGNESIUM SERPL-MCNC: 1.9 MG/DL (ref 1.6–2.6)
MCH RBC QN AUTO: 29.5 PG (ref 26.6–33)
MCHC RBC AUTO-ENTMCNC: 33.7 G/DL (ref 31.5–35.7)
MCV RBC AUTO: 87.5 FL (ref 79–97)
MONOCYTES # BLD AUTO: 0.51 10*3/MM3 (ref 0.1–0.9)
MONOCYTES NFR BLD AUTO: 7.3 % (ref 5–12)
NEUTROPHILS NFR BLD AUTO: 3.12 10*3/MM3 (ref 1.7–7)
NEUTROPHILS NFR BLD AUTO: 44.4 % (ref 42.7–76)
NITRITE UR QL STRIP: NEGATIVE
NITRITE UR-MCNC: NEGATIVE MG/ML
NRBC BLD AUTO-RTO: 0 /100 WBC (ref 0–0.2)
PH UR STRIP.AUTO: 5.5 [PH] (ref 5–8)
PH UR: 6 [PH] (ref 5–8)
PLATELET # BLD AUTO: 221 10*3/MM3 (ref 140–450)
PMV BLD AUTO: 9.5 FL (ref 6–12)
POTASSIUM SERPL-SCNC: 3.8 MMOL/L (ref 3.5–5.2)
PROCALCITONIN SERPL-MCNC: 0.03 NG/ML (ref 0–0.25)
PROT SERPL-MCNC: 8.1 G/DL (ref 6–8.5)
PROT UR QL STRIP: NEGATIVE
PROT UR STRIP-MCNC: ABNORMAL MG/DL
RBC # BLD AUTO: 4.48 10*6/MM3 (ref 3.77–5.28)
RBC # UR STRIP: NEGATIVE /UL
SARS-COV-2 RNA PNL SPEC NAA+PROBE: NOT DETECTED
SODIUM SERPL-SCNC: 139 MMOL/L (ref 136–145)
SP GR UR STRIP: >1.03 (ref 1–1.03)
SP GR UR: 1.02 (ref 1–1.03)
UROBILINOGEN UR QL STRIP: ABNORMAL
UROBILINOGEN UR QL: NORMAL
WBC NRBC COR # BLD: 7.03 10*3/MM3 (ref 3.4–10.8)
WHOLE BLOOD HOLD SPECIMEN: NORMAL
WHOLE BLOOD HOLD SPECIMEN: NORMAL

## 2022-03-07 PROCEDURE — 93010 ELECTROCARDIOGRAM REPORT: CPT | Performed by: INTERNAL MEDICINE

## 2022-03-07 PROCEDURE — 81003 URINALYSIS AUTO W/O SCOPE: CPT | Performed by: PHYSICIAN ASSISTANT

## 2022-03-07 PROCEDURE — 83605 ASSAY OF LACTIC ACID: CPT | Performed by: PHYSICIAN ASSISTANT

## 2022-03-07 PROCEDURE — G0378 HOSPITAL OBSERVATION PER HR: HCPCS

## 2022-03-07 PROCEDURE — 25010000002 HYDROMORPHONE 1 MG/ML SOLUTION: Performed by: NURSE PRACTITIONER

## 2022-03-07 PROCEDURE — 83735 ASSAY OF MAGNESIUM: CPT | Performed by: PHYSICIAN ASSISTANT

## 2022-03-07 PROCEDURE — 25010000002 IOPAMIDOL 61 % SOLUTION: Performed by: EMERGENCY MEDICINE

## 2022-03-07 PROCEDURE — 25010000002 DIPHENHYDRAMINE PER 50 MG: Performed by: NURSE PRACTITIONER

## 2022-03-07 PROCEDURE — 25010000002 ONDANSETRON PER 1 MG: Performed by: NURSE PRACTITIONER

## 2022-03-07 PROCEDURE — C9803 HOPD COVID-19 SPEC COLLECT: HCPCS

## 2022-03-07 PROCEDURE — 96374 THER/PROPH/DIAG INJ IV PUSH: CPT

## 2022-03-07 PROCEDURE — 80053 COMPREHEN METABOLIC PANEL: CPT | Performed by: PHYSICIAN ASSISTANT

## 2022-03-07 PROCEDURE — 96376 TX/PRO/DX INJ SAME DRUG ADON: CPT

## 2022-03-07 PROCEDURE — 96375 TX/PRO/DX INJ NEW DRUG ADDON: CPT

## 2022-03-07 PROCEDURE — 74177 CT ABD & PELVIS W/CONTRAST: CPT

## 2022-03-07 PROCEDURE — 25010000002 HEPARIN (PORCINE) PER 1000 UNITS: Performed by: SURGERY

## 2022-03-07 PROCEDURE — 87040 BLOOD CULTURE FOR BACTERIA: CPT | Performed by: PHYSICIAN ASSISTANT

## 2022-03-07 PROCEDURE — 99219 PR INITIAL OBSERVATION CARE/DAY 50 MINUTES: CPT | Performed by: SURGERY

## 2022-03-07 PROCEDURE — 86140 C-REACTIVE PROTEIN: CPT | Performed by: PHYSICIAN ASSISTANT

## 2022-03-07 PROCEDURE — 87636 SARSCOV2 & INF A&B AMP PRB: CPT | Performed by: PHYSICIAN ASSISTANT

## 2022-03-07 PROCEDURE — 83690 ASSAY OF LIPASE: CPT | Performed by: PHYSICIAN ASSISTANT

## 2022-03-07 PROCEDURE — 96372 THER/PROPH/DIAG INJ SC/IM: CPT

## 2022-03-07 PROCEDURE — 84145 PROCALCITONIN (PCT): CPT | Performed by: PHYSICIAN ASSISTANT

## 2022-03-07 PROCEDURE — 85025 COMPLETE CBC W/AUTO DIFF WBC: CPT | Performed by: PHYSICIAN ASSISTANT

## 2022-03-07 PROCEDURE — 99284 EMERGENCY DEPT VISIT MOD MDM: CPT

## 2022-03-07 PROCEDURE — 25010000002 KETOROLAC TROMETHAMINE PER 15 MG: Performed by: SURGERY

## 2022-03-07 PROCEDURE — 81025 URINE PREGNANCY TEST: CPT | Performed by: FAMILY MEDICINE

## 2022-03-07 PROCEDURE — 99214 OFFICE O/P EST MOD 30 MIN: CPT | Performed by: FAMILY MEDICINE

## 2022-03-07 PROCEDURE — 81025 URINE PREGNANCY TEST: CPT | Performed by: PHYSICIAN ASSISTANT

## 2022-03-07 PROCEDURE — 93005 ELECTROCARDIOGRAM TRACING: CPT | Performed by: SURGERY

## 2022-03-07 PROCEDURE — 25010000002 PIPERACILLIN SOD-TAZOBACTAM PER 1 G: Performed by: SURGERY

## 2022-03-07 PROCEDURE — 25010000002 KETOROLAC TROMETHAMINE PER 15 MG: Performed by: NURSE PRACTITIONER

## 2022-03-07 RX ORDER — ARIPIPRAZOLE 5 MG/1
5 TABLET ORAL NIGHTLY
COMMUNITY
End: 2022-03-25 | Stop reason: SDUPTHER

## 2022-03-07 RX ORDER — ONDANSETRON 2 MG/ML
4 INJECTION INTRAMUSCULAR; INTRAVENOUS ONCE
Status: COMPLETED | OUTPATIENT
Start: 2022-03-07 | End: 2022-03-07

## 2022-03-07 RX ORDER — VENLAFAXINE HYDROCHLORIDE 75 MG/1
75 CAPSULE, EXTENDED RELEASE ORAL NIGHTLY
COMMUNITY
End: 2022-03-25 | Stop reason: SDUPTHER

## 2022-03-07 RX ORDER — HEPARIN SODIUM 5000 [USP'U]/ML
5000 INJECTION, SOLUTION INTRAVENOUS; SUBCUTANEOUS EVERY 8 HOURS SCHEDULED
Status: DISCONTINUED | OUTPATIENT
Start: 2022-03-07 | End: 2022-03-08 | Stop reason: HOSPADM

## 2022-03-07 RX ORDER — DIPHENHYDRAMINE HYDROCHLORIDE 50 MG/ML
25 INJECTION INTRAMUSCULAR; INTRAVENOUS ONCE
Status: COMPLETED | OUTPATIENT
Start: 2022-03-07 | End: 2022-03-07

## 2022-03-07 RX ORDER — KETOROLAC TROMETHAMINE 30 MG/ML
15 INJECTION, SOLUTION INTRAMUSCULAR; INTRAVENOUS EVERY 6 HOURS
Status: DISCONTINUED | OUTPATIENT
Start: 2022-03-07 | End: 2022-03-08 | Stop reason: HOSPADM

## 2022-03-07 RX ORDER — ONDANSETRON 2 MG/ML
4 INJECTION INTRAMUSCULAR; INTRAVENOUS EVERY 6 HOURS PRN
Status: DISCONTINUED | OUTPATIENT
Start: 2022-03-07 | End: 2022-03-08 | Stop reason: HOSPADM

## 2022-03-07 RX ORDER — DEXTROSE, SODIUM CHLORIDE, SODIUM LACTATE, POTASSIUM CHLORIDE, AND CALCIUM CHLORIDE 5; .6; .31; .03; .02 G/100ML; G/100ML; G/100ML; G/100ML; G/100ML
100 INJECTION, SOLUTION INTRAVENOUS CONTINUOUS
Status: DISCONTINUED | OUTPATIENT
Start: 2022-03-07 | End: 2022-03-08

## 2022-03-07 RX ORDER — KETOROLAC TROMETHAMINE 30 MG/ML
30 INJECTION, SOLUTION INTRAMUSCULAR; INTRAVENOUS ONCE
Status: COMPLETED | OUTPATIENT
Start: 2022-03-07 | End: 2022-03-07

## 2022-03-07 RX ORDER — ACETAMINOPHEN 500 MG
1000 TABLET ORAL EVERY 6 HOURS
Status: DISCONTINUED | OUTPATIENT
Start: 2022-03-07 | End: 2022-03-08 | Stop reason: HOSPADM

## 2022-03-07 RX ORDER — TRAMADOL HYDROCHLORIDE 50 MG/1
50 TABLET ORAL EVERY 6 HOURS PRN
Status: DISCONTINUED | OUTPATIENT
Start: 2022-03-07 | End: 2022-03-08 | Stop reason: HOSPADM

## 2022-03-07 RX ORDER — SODIUM CHLORIDE 0.9 % (FLUSH) 0.9 %
10 SYRINGE (ML) INJECTION AS NEEDED
Status: DISCONTINUED | OUTPATIENT
Start: 2022-03-07 | End: 2022-03-08 | Stop reason: HOSPADM

## 2022-03-07 RX ADMIN — PIPERACILLIN SODIUM AND TAZOBACTAM SODIUM 3.38 G: 3; .375 INJECTION, POWDER, LYOPHILIZED, FOR SOLUTION INTRAVENOUS at 21:26

## 2022-03-07 RX ADMIN — HYDROMORPHONE HYDROCHLORIDE 1 MG: 1 INJECTION, SOLUTION INTRAMUSCULAR; INTRAVENOUS; SUBCUTANEOUS at 21:05

## 2022-03-07 RX ADMIN — SODIUM CHLORIDE, SODIUM LACTATE, POTASSIUM CHLORIDE, CALCIUM CHLORIDE AND DEXTROSE MONOHYDRATE 100 ML/HR: 5; 600; 310; 30; 20 INJECTION, SOLUTION INTRAVENOUS at 23:23

## 2022-03-07 RX ADMIN — KETOROLAC TROMETHAMINE 15 MG: 30 INJECTION, SOLUTION INTRAMUSCULAR; INTRAVENOUS at 23:26

## 2022-03-07 RX ADMIN — DIPHENHYDRAMINE HYDROCHLORIDE 25 MG: 50 INJECTION, SOLUTION INTRAMUSCULAR; INTRAVENOUS at 17:53

## 2022-03-07 RX ADMIN — HEPARIN SODIUM 5000 UNITS: 5000 INJECTION INTRAVENOUS; SUBCUTANEOUS at 21:26

## 2022-03-07 RX ADMIN — KETOROLAC TROMETHAMINE 30 MG: 30 INJECTION, SOLUTION INTRAMUSCULAR; INTRAVENOUS at 17:40

## 2022-03-07 RX ADMIN — IOPAMIDOL 85 ML: 612 INJECTION, SOLUTION INTRAVENOUS at 19:33

## 2022-03-07 RX ADMIN — ACETAMINOPHEN 1000 MG: 500 TABLET ORAL at 21:26

## 2022-03-07 RX ADMIN — FAMOTIDINE 20 MG: 10 INJECTION, SOLUTION INTRAVENOUS at 21:26

## 2022-03-07 RX ADMIN — ONDANSETRON 4 MG: 2 INJECTION INTRAMUSCULAR; INTRAVENOUS at 17:40

## 2022-03-07 RX ADMIN — SODIUM CHLORIDE 1000 ML: 9 INJECTION, SOLUTION INTRAVENOUS at 17:40

## 2022-03-07 NOTE — ED NOTES
IV attempted x2 per myself and attempt per OMAR Reynolds. Charge RN to attempt     Sharon Mar RN  03/07/22 0180

## 2022-03-07 NOTE — ED NOTES
MEDICAL SCREENING:    Reason for Visit: RLQ abd pain that started 2 days ago, states she started to run a fever yesterday and the pain worsened. She states the pain is some better today but still there. She states she still has an appendix.    Patient initially seen in triage.  The patient was advised further evaluation and diagnostic testing will be needed, some of the treatment and testing will be initiated in the lobby in order to begin the process.  The patient will be returned to the waiting area for the time being and possibly be re-assessed by a subsequent ED provider.  The patient will be brought back to the treatment area in as timely manner as possible.       Nichole Ma PA  03/07/22 [id number]

## 2022-03-08 ENCOUNTER — ANESTHESIA (OUTPATIENT)
Dept: PERIOP | Facility: HOSPITAL | Age: 47
End: 2022-03-08

## 2022-03-08 ENCOUNTER — ANESTHESIA EVENT (OUTPATIENT)
Dept: PERIOP | Facility: HOSPITAL | Age: 47
End: 2022-03-08

## 2022-03-08 ENCOUNTER — APPOINTMENT (OUTPATIENT)
Dept: GENERAL RADIOLOGY | Facility: HOSPITAL | Age: 47
End: 2022-03-08

## 2022-03-08 ENCOUNTER — READMISSION MANAGEMENT (OUTPATIENT)
Dept: CALL CENTER | Facility: HOSPITAL | Age: 47
End: 2022-03-08

## 2022-03-08 VITALS
HEIGHT: 67 IN | WEIGHT: 190 LBS | SYSTOLIC BLOOD PRESSURE: 118 MMHG | HEART RATE: 64 BPM | BODY MASS INDEX: 29.82 KG/M2 | RESPIRATION RATE: 16 BRPM | TEMPERATURE: 97.6 F | DIASTOLIC BLOOD PRESSURE: 80 MMHG | OXYGEN SATURATION: 100 %

## 2022-03-08 LAB
QT INTERVAL: 398 MS
QTC INTERVAL: 441 MS

## 2022-03-08 PROCEDURE — 25010000002 MIDAZOLAM PER 1 MG: Performed by: NURSE ANESTHETIST, CERTIFIED REGISTERED

## 2022-03-08 PROCEDURE — 96376 TX/PRO/DX INJ SAME DRUG ADON: CPT

## 2022-03-08 PROCEDURE — 25010000002 ONDANSETRON PER 1 MG: Performed by: NURSE ANESTHETIST, CERTIFIED REGISTERED

## 2022-03-08 PROCEDURE — C1889 IMPLANT/INSERT DEVICE, NOC: HCPCS | Performed by: SURGERY

## 2022-03-08 PROCEDURE — 25010000002 KETOROLAC TROMETHAMINE PER 15 MG: Performed by: SURGERY

## 2022-03-08 PROCEDURE — 25010000002 NEOSTIGMINE 10 MG/10ML SOLUTION: Performed by: NURSE ANESTHETIST, CERTIFIED REGISTERED

## 2022-03-08 PROCEDURE — 25010000002 FENTANYL CITRATE (PF) 50 MCG/ML SOLUTION: Performed by: NURSE ANESTHETIST, CERTIFIED REGISTERED

## 2022-03-08 PROCEDURE — 25010000002 PIPERACILLIN SOD-TAZOBACTAM PER 1 G: Performed by: SURGERY

## 2022-03-08 PROCEDURE — 25010000002 HEPARIN (PORCINE) PER 1000 UNITS: Performed by: SURGERY

## 2022-03-08 PROCEDURE — 25010000002 PROPOFOL 10 MG/ML EMULSION: Performed by: NURSE ANESTHETIST, CERTIFIED REGISTERED

## 2022-03-08 PROCEDURE — 25010000002 PIPERACILLIN SOD-TAZOBACTAM PER 1 G

## 2022-03-08 PROCEDURE — 44970 LAPAROSCOPY APPENDECTOMY: CPT | Performed by: SURGERY

## 2022-03-08 PROCEDURE — 25010000002 KETOROLAC TROMETHAMINE PER 15 MG: Performed by: NURSE ANESTHETIST, CERTIFIED REGISTERED

## 2022-03-08 PROCEDURE — 96372 THER/PROPH/DIAG INJ SC/IM: CPT

## 2022-03-08 PROCEDURE — G0378 HOSPITAL OBSERVATION PER HR: HCPCS

## 2022-03-08 DEVICE — THE ECHELON, ECHELON ENDOPATH™ AND ECHELON FLEX™ FAMILIES OF ENDOSCOPIC LINEAR CUTTERS AND RELOADS ARE STERILE, SINGLE PATIENT USE INSTRUMENTS THAT SIMULTANEOUSLY CUT AND STAPLE TISSUE. THERE ARE SIX STAGGERED ROWS OF STAPLES, THREE ON EITHER SIDE OF THE CUT LINE. THE 45 MM INSTRUMENTS HAVE A STAPLE LINE THATIS APPROXIMATELY 45 MM LONG AND A CUT LINE THAT IS APPROXIMATELY 42 MM LONG. THE SHAFT CAN ROTATE FREELY IN BOTH DIRECTIONS AND AN ARTICULATION MECHANISM ON ARTICULATING INSTRUMENTS ENABLES BENDING THE DISTAL PORTIONOF THE SHAFT TO FACILITATE LATERAL ACCESS OF THE OPERATIVE SITE.THE INSTRUMENTS ARE SHIPPED WITHOUT A RELOAD AND MUST BE LOADED PRIOR TO USE. A STAPLE RETAINING CAP ON THE RELOAD PROTECTS THE STAPLE LEG POINTS DURING SHIPPING AND TRANSPORTATION. THE INSTRUMENTS’ LOCK-OUT FEATURE IS DESIGNED TO PREVENT A USED RELOAD FROM BEING REFIRED.
Type: IMPLANTABLE DEVICE | Site: ABDOMEN | Status: FUNCTIONAL
Brand: ECHELON ENDOPATH

## 2022-03-08 DEVICE — ENDOSCOPIC LINEAR CUTTER RELOADS GRAY 2.0 MM
Type: IMPLANTABLE DEVICE | Site: ABDOMEN | Status: FUNCTIONAL
Brand: ECHELON; ENDOPATH

## 2022-03-08 DEVICE — ARISTA AH ABSORBABLE HEMOSTATIC PARTICLES
Type: IMPLANTABLE DEVICE | Site: ABDOMEN | Status: FUNCTIONAL
Brand: ARISTA™ AH

## 2022-03-08 RX ORDER — TRAMADOL HYDROCHLORIDE 50 MG/1
50 TABLET ORAL EVERY 6 HOURS PRN
Qty: 12 TABLET | Refills: 0 | Status: SHIPPED | OUTPATIENT
Start: 2022-03-08 | End: 2022-03-14

## 2022-03-08 RX ORDER — ACETAMINOPHEN 500 MG
1000 TABLET ORAL EVERY 6 HOURS
Qty: 40 TABLET | Refills: 0 | Status: SHIPPED | OUTPATIENT
Start: 2022-03-08 | End: 2022-03-23

## 2022-03-08 RX ORDER — ROCURONIUM BROMIDE 10 MG/ML
INJECTION, SOLUTION INTRAVENOUS AS NEEDED
Status: DISCONTINUED | OUTPATIENT
Start: 2022-03-08 | End: 2022-03-08 | Stop reason: SURG

## 2022-03-08 RX ORDER — VENLAFAXINE HYDROCHLORIDE 75 MG/1
75 CAPSULE, EXTENDED RELEASE ORAL NIGHTLY
Status: CANCELLED | OUTPATIENT
Start: 2022-03-08

## 2022-03-08 RX ORDER — FENTANYL CITRATE 50 UG/ML
INJECTION, SOLUTION INTRAMUSCULAR; INTRAVENOUS AS NEEDED
Status: DISCONTINUED | OUTPATIENT
Start: 2022-03-08 | End: 2022-03-08 | Stop reason: SURG

## 2022-03-08 RX ORDER — KETOROLAC TROMETHAMINE 30 MG/ML
30 INJECTION, SOLUTION INTRAMUSCULAR; INTRAVENOUS EVERY 6 HOURS PRN
Status: COMPLETED | OUTPATIENT
Start: 2022-03-08 | End: 2022-03-08

## 2022-03-08 RX ORDER — MIDAZOLAM HYDROCHLORIDE 1 MG/ML
INJECTION INTRAMUSCULAR; INTRAVENOUS AS NEEDED
Status: DISCONTINUED | OUTPATIENT
Start: 2022-03-08 | End: 2022-03-08 | Stop reason: SURG

## 2022-03-08 RX ORDER — SODIUM CHLORIDE, SODIUM LACTATE, POTASSIUM CHLORIDE, CALCIUM CHLORIDE 600; 310; 30; 20 MG/100ML; MG/100ML; MG/100ML; MG/100ML
125 INJECTION, SOLUTION INTRAVENOUS ONCE
Status: DISCONTINUED | OUTPATIENT
Start: 2022-03-08 | End: 2022-03-08 | Stop reason: HOSPADM

## 2022-03-08 RX ORDER — ONDANSETRON 2 MG/ML
4 INJECTION INTRAMUSCULAR; INTRAVENOUS AS NEEDED
Status: DISCONTINUED | OUTPATIENT
Start: 2022-03-08 | End: 2022-03-08 | Stop reason: HOSPADM

## 2022-03-08 RX ORDER — GLYCOPYRROLATE 0.2 MG/ML
INJECTION INTRAMUSCULAR; INTRAVENOUS AS NEEDED
Status: DISCONTINUED | OUTPATIENT
Start: 2022-03-08 | End: 2022-03-08 | Stop reason: SURG

## 2022-03-08 RX ORDER — ARIPIPRAZOLE 10 MG/1
5 TABLET ORAL NIGHTLY
Status: CANCELLED | OUTPATIENT
Start: 2022-03-08

## 2022-03-08 RX ORDER — OXYCODONE HYDROCHLORIDE AND ACETAMINOPHEN 5; 325 MG/1; MG/1
1 TABLET ORAL ONCE AS NEEDED
Status: DISCONTINUED | OUTPATIENT
Start: 2022-03-08 | End: 2022-03-08 | Stop reason: HOSPADM

## 2022-03-08 RX ORDER — SODIUM CHLORIDE 0.9 % (FLUSH) 0.9 %
10 SYRINGE (ML) INJECTION EVERY 12 HOURS SCHEDULED
Status: DISCONTINUED | OUTPATIENT
Start: 2022-03-08 | End: 2022-03-08 | Stop reason: HOSPADM

## 2022-03-08 RX ORDER — LEVOTHYROXINE SODIUM 0.05 MG/1
50 TABLET ORAL
Status: CANCELLED | OUTPATIENT
Start: 2022-03-08

## 2022-03-08 RX ORDER — LORAZEPAM 0.5 MG/1
0.5 TABLET ORAL DAILY PRN
Status: CANCELLED | OUTPATIENT
Start: 2022-03-08

## 2022-03-08 RX ORDER — MAGNESIUM HYDROXIDE 1200 MG/15ML
LIQUID ORAL AS NEEDED
Status: DISCONTINUED | OUTPATIENT
Start: 2022-03-08 | End: 2022-03-08 | Stop reason: HOSPADM

## 2022-03-08 RX ORDER — FAMOTIDINE 10 MG/ML
INJECTION, SOLUTION INTRAVENOUS AS NEEDED
Status: DISCONTINUED | OUTPATIENT
Start: 2022-03-08 | End: 2022-03-08 | Stop reason: SURG

## 2022-03-08 RX ORDER — SODIUM CHLORIDE 0.9 % (FLUSH) 0.9 %
10 SYRINGE (ML) INJECTION AS NEEDED
Status: DISCONTINUED | OUTPATIENT
Start: 2022-03-08 | End: 2022-03-08 | Stop reason: HOSPADM

## 2022-03-08 RX ORDER — ALBUTEROL SULFATE 2.5 MG/3ML
2.5 SOLUTION RESPIRATORY (INHALATION) EVERY 6 HOURS PRN
Status: CANCELLED | OUTPATIENT
Start: 2022-03-08

## 2022-03-08 RX ORDER — MEPERIDINE HYDROCHLORIDE 25 MG/ML
12.5 INJECTION INTRAMUSCULAR; INTRAVENOUS; SUBCUTANEOUS
Status: DISCONTINUED | OUTPATIENT
Start: 2022-03-08 | End: 2022-03-08 | Stop reason: HOSPADM

## 2022-03-08 RX ORDER — DROPERIDOL 2.5 MG/ML
0.62 INJECTION, SOLUTION INTRAMUSCULAR; INTRAVENOUS ONCE AS NEEDED
Status: DISCONTINUED | OUTPATIENT
Start: 2022-03-08 | End: 2022-03-08 | Stop reason: HOSPADM

## 2022-03-08 RX ORDER — PANTOPRAZOLE SODIUM 40 MG/1
40 TABLET, DELAYED RELEASE ORAL EVERY MORNING
Refills: 2 | Status: CANCELLED | OUTPATIENT
Start: 2022-03-08

## 2022-03-08 RX ORDER — SODIUM CHLORIDE, SODIUM LACTATE, POTASSIUM CHLORIDE, CALCIUM CHLORIDE 600; 310; 30; 20 MG/100ML; MG/100ML; MG/100ML; MG/100ML
100 INJECTION, SOLUTION INTRAVENOUS ONCE AS NEEDED
Status: DISCONTINUED | OUTPATIENT
Start: 2022-03-08 | End: 2022-03-08 | Stop reason: HOSPADM

## 2022-03-08 RX ORDER — PANTOPRAZOLE SODIUM 40 MG/1
40 TABLET, DELAYED RELEASE ORAL EVERY MORNING
Status: CANCELLED | OUTPATIENT
Start: 2022-03-08

## 2022-03-08 RX ORDER — NEOSTIGMINE METHYLSULFATE 1 MG/ML
INJECTION, SOLUTION INTRAVENOUS AS NEEDED
Status: DISCONTINUED | OUTPATIENT
Start: 2022-03-08 | End: 2022-03-08 | Stop reason: SURG

## 2022-03-08 RX ORDER — PROPOFOL 10 MG/ML
VIAL (ML) INTRAVENOUS AS NEEDED
Status: DISCONTINUED | OUTPATIENT
Start: 2022-03-08 | End: 2022-03-08 | Stop reason: SURG

## 2022-03-08 RX ORDER — BUPIVACAINE HYDROCHLORIDE AND EPINEPHRINE 5; 5 MG/ML; UG/ML
INJECTION, SOLUTION EPIDURAL; INTRACAUDAL; PERINEURAL AS NEEDED
Status: DISCONTINUED | OUTPATIENT
Start: 2022-03-08 | End: 2022-03-08 | Stop reason: HOSPADM

## 2022-03-08 RX ORDER — ALBUTEROL SULFATE 2.5 MG/3ML
2.5 SOLUTION RESPIRATORY (INHALATION) EVERY 6 HOURS PRN
Refills: 2 | Status: CANCELLED | OUTPATIENT
Start: 2022-03-08

## 2022-03-08 RX ORDER — ONDANSETRON 2 MG/ML
INJECTION INTRAMUSCULAR; INTRAVENOUS AS NEEDED
Status: DISCONTINUED | OUTPATIENT
Start: 2022-03-08 | End: 2022-03-08 | Stop reason: SURG

## 2022-03-08 RX ORDER — FENTANYL CITRATE 50 UG/ML
50 INJECTION, SOLUTION INTRAMUSCULAR; INTRAVENOUS
Status: DISCONTINUED | OUTPATIENT
Start: 2022-03-08 | End: 2022-03-08 | Stop reason: HOSPADM

## 2022-03-08 RX ORDER — SODIUM CHLORIDE, SODIUM LACTATE, POTASSIUM CHLORIDE, CALCIUM CHLORIDE 600; 310; 30; 20 MG/100ML; MG/100ML; MG/100ML; MG/100ML
INJECTION, SOLUTION INTRAVENOUS CONTINUOUS PRN
Status: DISCONTINUED | OUTPATIENT
Start: 2022-03-08 | End: 2022-03-08 | Stop reason: SURG

## 2022-03-08 RX ORDER — IBUPROFEN 600 MG/1
600 TABLET ORAL EVERY 6 HOURS PRN
Qty: 20 TABLET | Refills: 0 | Status: SHIPPED | OUTPATIENT
Start: 2022-03-08 | End: 2023-03-08

## 2022-03-08 RX ORDER — LIDOCAINE HYDROCHLORIDE 20 MG/ML
INJECTION, SOLUTION INFILTRATION; PERINEURAL AS NEEDED
Status: DISCONTINUED | OUTPATIENT
Start: 2022-03-08 | End: 2022-03-08 | Stop reason: SURG

## 2022-03-08 RX ORDER — IPRATROPIUM BROMIDE AND ALBUTEROL SULFATE 2.5; .5 MG/3ML; MG/3ML
3 SOLUTION RESPIRATORY (INHALATION) ONCE AS NEEDED
Status: DISCONTINUED | OUTPATIENT
Start: 2022-03-08 | End: 2022-03-08 | Stop reason: HOSPADM

## 2022-03-08 RX ADMIN — PROPOFOL 50 MG: 10 INJECTION, EMULSION INTRAVENOUS at 09:15

## 2022-03-08 RX ADMIN — FENTANYL CITRATE 100 MCG: 50 INJECTION INTRAMUSCULAR; INTRAVENOUS at 09:08

## 2022-03-08 RX ADMIN — PROPOFOL 150 MG: 10 INJECTION, EMULSION INTRAVENOUS at 09:13

## 2022-03-08 RX ADMIN — ROCURONIUM BROMIDE 30 MG: 10 SOLUTION INTRAVENOUS at 09:13

## 2022-03-08 RX ADMIN — KETOROLAC TROMETHAMINE 30 MG: 30 INJECTION, SOLUTION INTRAMUSCULAR; INTRAVENOUS at 10:50

## 2022-03-08 RX ADMIN — LIDOCAINE HYDROCHLORIDE 60 MG: 20 INJECTION, SOLUTION INFILTRATION; PERINEURAL at 09:13

## 2022-03-08 RX ADMIN — FAMOTIDINE 20 MG: 10 INJECTION INTRAVENOUS at 09:13

## 2022-03-08 RX ADMIN — HEPARIN SODIUM 5000 UNITS: 5000 INJECTION INTRAVENOUS; SUBCUTANEOUS at 05:21

## 2022-03-08 RX ADMIN — GLYCOPYRROLATE 0.4 MG: 0.2 INJECTION, SOLUTION INTRAMUSCULAR; INTRAVENOUS at 10:13

## 2022-03-08 RX ADMIN — NEOSTIGMINE 3 MG: 1 INJECTION INTRAVENOUS at 10:13

## 2022-03-08 RX ADMIN — FENTANYL CITRATE 50 MCG: 50 INJECTION INTRAMUSCULAR; INTRAVENOUS at 10:43

## 2022-03-08 RX ADMIN — MIDAZOLAM 2 MG: 1 INJECTION INTRAMUSCULAR; INTRAVENOUS at 09:08

## 2022-03-08 RX ADMIN — SODIUM CHLORIDE, POTASSIUM CHLORIDE, SODIUM LACTATE AND CALCIUM CHLORIDE: 600; 310; 30; 20 INJECTION, SOLUTION INTRAVENOUS at 09:08

## 2022-03-08 RX ADMIN — ACETAMINOPHEN 1000 MG: 500 TABLET ORAL at 03:55

## 2022-03-08 RX ADMIN — KETOROLAC TROMETHAMINE 15 MG: 30 INJECTION, SOLUTION INTRAMUSCULAR; INTRAVENOUS at 05:20

## 2022-03-08 RX ADMIN — ONDANSETRON 4 MG: 2 INJECTION INTRAMUSCULAR; INTRAVENOUS at 09:13

## 2022-03-08 RX ADMIN — PIPERACILLIN SODIUM AND TAZOBACTAM SODIUM 3.38 G: 3; .375 INJECTION, POWDER, LYOPHILIZED, FOR SOLUTION INTRAVENOUS at 03:56

## 2022-03-08 RX ADMIN — FENTANYL CITRATE 50 MCG: 50 INJECTION INTRAMUSCULAR; INTRAVENOUS at 10:38

## 2022-03-08 NOTE — PLAN OF CARE
Goal Outcome Evaluation:  Plan of Care Reviewed With: patient        Progress: improving   Vital signs stable. Patient ambulating to bathroom with no issues. Has been NPO since midnight. D5 and LR infusing at 100ml/hr. Sequential Compression Device is in place. No current needs or concerns.

## 2022-03-08 NOTE — PROGRESS NOTES
"Meghan Rivas     VITALS: Blood pressure 116/78, pulse 70, temperature 97.8 °F (36.6 °C), height 170.2 cm (67.01\"), weight 86.9 kg (191 lb 9.6 oz), last menstrual period 02/20/2022, SpO2 99 %, not currently breastfeeding.    Subjective  Chief Complaint  Flank Pain (R kidney pain)    Subjective          History of Present Illness:  Patient is a 46 y.o.  female with medical conditions significant for depression, MVP, and chronic hepatitis C who presents to clinic secondary to an acute concern. She states that she has been having right lower quadrant pain. UA is negative. Pregnancy test negative. No history of diverticulitis. She does have a history of constipation. She is accompanied by adult male ().    The patient states that her pain is different than it has been in the past. She reports that she has pain on the right side of her abdomen, but yesterday it was the whole abdomen. She reports that she was vomiting and was cramping. She reports that today it is just in the right side. She localizes where it hurts and states that it is tender to the touch. She reports that she had a fever on yesterday, 03/06/2022 and she was vomiting constantly. She denies taking her temperature today, but she does not feel like she did yesterday. She reports that she is having a little bit of pain in her back.    The patient states that she is unable to afford being off work today. She adds that this is the first time in 1 year she has had to call in. She adds that she felt like she was not going to make it yesterday and that all she could do was lay on one side.     No complaints about any of the medications.    The following portions of the patient's history were reviewed and updated as appropriate: allergies, current medications, past family history, past medical history, past social history, past surgical history and problem list.    Past Medical History  Past Medical History:   Diagnosis Date   • Anxiety    • Depression  " "  • Disease of thyroid gland    • GERD (gastroesophageal reflux disease)    • Hepatitis C    • History of drug abuse (HCC)    • Seizures (HCC)        Surgical History  Past Surgical History:   Procedure Laterality Date   • OVARIAN CYST SURGERY      x7 surgeries       Family History  Family History   Adopted: Yes       Social History  Social History     Socioeconomic History   • Marital status:    Tobacco Use   • Smoking status: Current Every Day Smoker     Packs/day: 0.25     Years: 3.50     Pack years: 0.87     Types: Cigarettes   • Smokeless tobacco: Never Used   Vaping Use   • Vaping Use: Former   • Substances: Nicotine   • Devices: RefActiveReplayble tank   Substance and Sexual Activity   • Alcohol use: No   • Drug use: Not Currently     Types: IV, Methamphetamines     Comment: quit 19 months ago   • Sexual activity: Defer       Objective   Vital Signs:   /78 (BP Location: Right arm, Patient Position: Sitting, Cuff Size: Adult)   Pulse 70   Temp 97.8 °F (36.6 °C)   Ht 170.2 cm (67.01\")   Wt 86.9 kg (191 lb 9.6 oz)   SpO2 99%   BMI 30.00 kg/m²     Physical Exam     Gen: Patient in some distress. Pleasant and answers appropriately. A&Ox3.    Skin: Warm and dry with normal turgor. No purpura, rashes, or unusual pigmentation noted. Hair is normal in appearance and distribution.    HEENT: NC/AT. No lesions noted. Conjunctiva clear, sclera nonicteric. PERRL. EOMI without nystagmus or strabismus. Fundi appear benign. No hemorrhages or exudates of eyes. Auditory canals are patent bilaterally without lesions. TMs intact,  nonerythematous, nonbulging without lesions. Nasal mucosa pink, nonerythematous, and nonedematous. Frontal and maxillary sinuses are nontender. O/P nonerythematous and moist without exudate.    Neck: Supple without lymph nodes palpated. FROM.     Lungs: CTA B/L without rales, rhonchi, crackles, or wheezes.    Heart: RRR. S1 and S2 normal. No S3 or S4. No MRGT.    Abd: Soft, tender in right " lower quadrant, slightly distended. (+)BSx4 quadrants.  No HSM or masses.  Positive McBurney's point.    Extrem: No CCE. Radial pulses 2+/4 and equal B/L. FROMx4.     Neuro: No focal motor/sensory deficits.    Procedures       Assessment and Plan    Meghan Rivas is a 46 y.o. here for medical followup.    Problem List Items Addressed This Visit    None     Visit Diagnoses     Kidney pain    -  Primary  - Discussed possible appendicitis.   - Advised to go to the emergency room.  - Discussed that she needs a CT scan of the abdomen and pelvis with contrast.  - Negative pregnancy test.  - Patient does not have a kidney stone.  - Will write note for work    Relevant Orders    POCT urinalysis dipstick, automated (Completed)    POCT pregnancy, urine (Completed)            Patient's Body mass index is 30 kg/m². indicating that she is obese (BMI >30). Obesity-related health conditions include the following: GERD. Obesity is unchanged. BMI is is above average; BMI management plan is completed. We discussed portion control and increasing exercise..       Meghan Rivas  reports that she has been smoking cigarettes. She has a 0.88 pack-year smoking history. She has never used smokeless tobacco.. I have educated her on the risk of diseases from using tobacco products such as cancer, COPD and heart disease.     I advised her to quit and she is not willing to quit.    I spent 3  minutes counseling the patient.              I spent 32 minutes caring for Meghan on this date of service. This time includes time spent by me in the following activities:obtaining and/or reviewing a separately obtained history, performing a medically appropriate examination and/or evaluation , counseling and educating the patient/family/caregiver, ordering medications, tests, or procedures and referring and communicating with other health care professionals   Follow Up   Return (already has appt).  Findings and plans discussed with patient who verbalizes  understanding and agreement. Will followup with patient once results are in. Patient was given instructions and counseling regarding her condition or for health maintenance advice. Please see specific information pulled into the AVS if appropriate.       Salome Li MD     Transcribed from ambient dictation for Salome Li MD by Jennifer Phipps.  03/08/22   12:16 EST    Patient verbalized consent to the visit recording.  I have personally performed the services described in this document as transcribed by the above individual, and it is both accurate and complete.  Salome Li MD  3/28/2022  06:16 EDT

## 2022-03-08 NOTE — ANESTHESIA PREPROCEDURE EVALUATION
Anesthesia Evaluation     Patient summary reviewed and Nursing notes reviewed   NPO Solid Status: > 8 hours  NPO Liquid Status: > 8 hours           Airway   Mallampati: II  TM distance: >3 FB  Neck ROM: full  Dental - normal exam     Pulmonary - negative pulmonary ROS    breath sounds clear to auscultation  Cardiovascular   Exercise tolerance: good (4-7 METS)    Rhythm: regular  Rate: normal    (-) valvular problems/murmurs      Neuro/Psych  (+) psychiatric history,    (-) seizures, syncope  GI/Hepatic/Renal/Endo    (+)   hepatitis C, liver disease,     Musculoskeletal (-) negative ROS    Abdominal     Abdomen: soft and tender.   Substance History - negative use     OB/GYN negative ob/gyn ROS         Other - negative ROS                       Anesthesia Plan    ASA 3 - emergent     general with block     intravenous induction     Anesthetic plan, all risks, benefits, and alternatives have been provided, discussed and informed consent has been obtained with: patient.  Use of blood products discussed with consented to blood products.   Plan discussed with CRNA.        CODE STATUS:    Level Of Support Discussed With: Patient  Code Status (Patient has no pulse and is not breathing): CPR (Attempt to Resuscitate)  Medical Interventions (Patient has pulse or is breathing): Full Support

## 2022-03-08 NOTE — ED PROVIDER NOTES
Subjective     History provided by:  Patient  Abdominal Pain  Pain location:  RLQ  Pain quality: stabbing    Pain radiates to:  Does not radiate  Pain severity:  Moderate  Onset quality:  Sudden  Timing:  Constant  Progression:  Waxing and waning  Context: not alcohol use, not awakening from sleep, not diet changes, not eating, not laxative use, not medication withdrawal, not previous surgeries, not recent illness, not recent travel, not retching, not sick contacts and not trauma    Relieved by:  Nothing  Ineffective treatments:  None tried  Associated symptoms: fatigue, fever and nausea    Associated symptoms: no anorexia, no belching, no chest pain, no constipation, no cough, no diarrhea, no dysuria, no hematemesis, no hematochezia, no hematuria, no melena, no shortness of breath and no vomiting        Review of Systems   Constitutional: Positive for fatigue and fever.   HENT: Negative.    Eyes: Negative.    Respiratory: Negative.  Negative for cough and shortness of breath.    Cardiovascular: Negative.  Negative for chest pain.   Gastrointestinal: Positive for abdominal pain and nausea. Negative for anorexia, constipation, diarrhea, hematemesis, hematochezia, melena and vomiting.   Endocrine: Negative.    Genitourinary: Negative.  Negative for dysuria and hematuria.   Musculoskeletal: Negative.    Skin: Negative.    Allergic/Immunologic: Negative.    Neurological: Negative.    Hematological: Negative.    Psychiatric/Behavioral: Negative.    All other systems reviewed and are negative.      Past Medical History:   Diagnosis Date   • Anxiety    • Depression    • Hepatitis C    • History of drug abuse (HCC)    • Seizures (HCC)        Allergies   Allergen Reactions   • Contrast Dye Anaphylaxis     Tolerated IV contrast CT 3/7/2022       Past Surgical History:   Procedure Laterality Date   • OVARIAN CYST SURGERY      x7 surgeries       Family History   Adopted: Yes       Social History     Socioeconomic History   •  Marital status:    Tobacco Use   • Smoking status: Current Every Day Smoker     Packs/day: 0.25     Years: 3.50     Pack years: 0.87     Types: Cigarettes   • Smokeless tobacco: Never Used   Vaping Use   • Vaping Use: Former   • Substances: Nicotine   • Devices: Refillable tank   Substance and Sexual Activity   • Alcohol use: No   • Drug use: Not Currently     Types: IV, Methamphetamines     Comment: quit 19 months ago   • Sexual activity: Defer           Objective   Physical Exam  Vitals and nursing note reviewed.   Constitutional:       General: She is not in acute distress.     Appearance: She is well-developed. She is not diaphoretic.   HENT:      Head: Normocephalic and atraumatic.      Right Ear: External ear normal.      Left Ear: External ear normal.      Nose: Nose normal.      Mouth/Throat:      Mouth: Mucous membranes are moist.      Pharynx: Oropharynx is clear.   Eyes:      Conjunctiva/sclera: Conjunctivae normal.      Pupils: Pupils are equal, round, and reactive to light.   Neck:      Vascular: No JVD.      Trachea: No tracheal deviation.   Cardiovascular:      Rate and Rhythm: Normal rate and regular rhythm.      Heart sounds: Normal heart sounds. No murmur heard.  Pulmonary:      Effort: Pulmonary effort is normal. No respiratory distress.      Breath sounds: Normal breath sounds. No wheezing.   Abdominal:      General: Bowel sounds are normal.      Palpations: Abdomen is soft.      Tenderness: There is abdominal tenderness in the right lower quadrant.   Musculoskeletal:         General: No deformity. Normal range of motion.      Cervical back: Normal range of motion and neck supple.   Skin:     General: Skin is warm and dry.      Capillary Refill: Capillary refill takes less than 2 seconds.      Coloration: Skin is not pale.      Findings: No erythema or rash.   Neurological:      General: No focal deficit present.      Mental Status: She is alert and oriented to person, place, and time.       Cranial Nerves: No cranial nerve deficit.   Psychiatric:         Mood and Affect: Mood normal.         Behavior: Behavior normal.         Thought Content: Thought content normal.         Procedures       Results for orders placed or performed during the hospital encounter of 03/07/22   COVID-19 and FLU A/B PCR - Swab, Nasopharynx    Specimen: Nasopharynx; Swab   Result Value Ref Range    COVID19 Not Detected Not Detected - Ref. Range    Influenza A PCR Not Detected Not Detected    Influenza B PCR Not Detected Not Detected   Comprehensive Metabolic Panel    Specimen: Arm, Left; Blood   Result Value Ref Range    Glucose 103 (H) 65 - 99 mg/dL    BUN 12 6 - 20 mg/dL    Creatinine 0.70 0.57 - 1.00 mg/dL    Sodium 139 136 - 145 mmol/L    Potassium 3.8 3.5 - 5.2 mmol/L    Chloride 105 98 - 107 mmol/L    CO2 22.0 22.0 - 29.0 mmol/L    Calcium 9.4 8.6 - 10.5 mg/dL    Total Protein 8.1 6.0 - 8.5 g/dL    Albumin 4.16 3.50 - 5.20 g/dL    ALT (SGPT) 10 1 - 33 U/L    AST (SGOT) 25 1 - 32 U/L    Alkaline Phosphatase 51 39 - 117 U/L    Total Bilirubin 0.4 0.0 - 1.2 mg/dL    Globulin 3.9 gm/dL    A/G Ratio 1.1 g/dL    BUN/Creatinine Ratio 17.1 7.0 - 25.0    Anion Gap 12.0 5.0 - 15.0 mmol/L    eGFR 108.2 >60.0 mL/min/1.73   Lipase    Specimen: Arm, Left; Blood   Result Value Ref Range    Lipase 15 13 - 60 U/L   Lactic Acid, Plasma    Specimen: Arm, Left; Blood   Result Value Ref Range    Lactate 0.8 0.5 - 2.0 mmol/L   Procalcitonin    Specimen: Arm, Left; Blood   Result Value Ref Range    Procalcitonin 0.03 0.00 - 0.25 ng/mL   C-reactive Protein    Specimen: Arm, Left; Blood   Result Value Ref Range    C-Reactive Protein 3.30 (H) 0.00 - 0.50 mg/dL   Magnesium    Specimen: Arm, Left; Blood   Result Value Ref Range    Magnesium 1.9 1.6 - 2.6 mg/dL   CBC Auto Differential    Specimen: Arm, Left; Blood   Result Value Ref Range    WBC 7.03 3.40 - 10.80 10*3/mm3    RBC 4.48 3.77 - 5.28 10*6/mm3    Hemoglobin 13.2 12.0 - 15.9 g/dL     Hematocrit 39.2 34.0 - 46.6 %    MCV 87.5 79.0 - 97.0 fL    MCH 29.5 26.6 - 33.0 pg    MCHC 33.7 31.5 - 35.7 g/dL    RDW 13.9 12.3 - 15.4 %    RDW-SD 44.4 37.0 - 54.0 fl    MPV 9.5 6.0 - 12.0 fL    Platelets 221 140 - 450 10*3/mm3    Neutrophil % 44.4 42.7 - 76.0 %    Lymphocyte % 45.1 19.6 - 45.3 %    Monocyte % 7.3 5.0 - 12.0 %    Eosinophil % 2.4 0.3 - 6.2 %    Basophil % 0.7 0.0 - 1.5 %    Immature Grans % 0.1 0.0 - 0.5 %    Neutrophils, Absolute 3.12 1.70 - 7.00 10*3/mm3    Lymphocytes, Absolute 3.17 (H) 0.70 - 3.10 10*3/mm3    Monocytes, Absolute 0.51 0.10 - 0.90 10*3/mm3    Eosinophils, Absolute 0.17 0.00 - 0.40 10*3/mm3    Basophils, Absolute 0.05 0.00 - 0.20 10*3/mm3    Immature Grans, Absolute 0.01 0.00 - 0.05 10*3/mm3    nRBC 0.0 0.0 - 0.2 /100 WBC   Green Top (Gel)   Result Value Ref Range    Extra Tube Hold for add-ons.    Lavender Top   Result Value Ref Range    Extra Tube hold for add-on    Gold Top - SST   Result Value Ref Range    Extra Tube Hold for add-ons.    Light Blue Top   Result Value Ref Range    Extra Tube hold for add-on        ED Course  ED Course as of 03/07/22 2148   Mon Mar 07, 2022   2059 CT Abdomen Pelvis With Contrast  IMPRESSION:  Mild diffuse enlargement of the appendix measuring up to 9 mm with subtle periappendiceal inflammatory change highly suggestive of early acute appendicitis. Appendix occupies a normal position within the right lower quadrant.     Small amount of free fluid posterior cul-de-sac nonspecific.     Foci of cortical scarring within both kidneys may represent the sequela of previous infection.    [MB]   2102 Case d/w Dr. Perkins. Will admit to his service and plan to do appendectomy in the AM. [MB]   2141 EKG normal sinus rhythm 74 bpm no ST segment elevation or depression no QTC prolongation no signs of WPW no STEMI. [JM]      ED Course User Index  [JM] Jeff Rea MD  [MB] Shayla Martínez, APRN                                                 [facility]    Final  diagnoses:   Acute appendicitis with localized peritonitis, unspecified whether abscess present, unspecified whether gangrene present, unspecified whether perforation present       ED Disposition  ED Disposition     ED Disposition   Decision to Admit    Condition   --    Comment   Level of Care: Med/Surg [1]   Diagnosis: Acute appendicitis [970571]   Admitting Physician: ZAINAB REAVES [[id number]]   Attending Physician: ZAINAB REAVES [[id number]]               No follow-up provider specified.       Medication List      No changes were made to your prescriptions during this visit.          Shayla Martínez, APRN  03/07/22 2142

## 2022-03-08 NOTE — ANESTHESIA PROCEDURE NOTES
Airway  Urgency: elective    Date/Time: 3/8/2022 9:14 AM  Airway not difficult    General Information and Staff    Patient location during procedure: OR  Anesthesiologist: Isai Harrington MD  CRNA: Gil Puga CRNA    Indications and Patient Condition  Indications for airway management: airway protection    Preoxygenated: yes  MILS maintained throughout  Mask difficulty assessment: 0 - not attempted    Final Airway Details  Final airway type: endotracheal airway      Successful airway: ETT  Cuffed: yes   Successful intubation technique: direct laryngoscopy  Facilitating devices/methods: intubating stylet  Endotracheal tube insertion site: oral  Blade: Glidescope  Blade size: 3  ETT size (mm): 7.0  Cormack-Lehane Classification: grade IIb - view of arytenoids or posterior of glottis only  Placement verified by: chest auscultation, capnometry and palpation of cuff   Cuff volume (mL): 10  Measured from: lips  ETT/EBT  to lips (cm): 21  Number of attempts at approach: 2  Assessment: lips, teeth, and gum same as pre-op and atraumatic intubation    Additional Comments  Dentition and lips same as preop

## 2022-03-08 NOTE — ANESTHESIA POSTPROCEDURE EVALUATION
Patient: Meghan Rivas    Procedure Summary     Date: 03/08/22 Room / Location:  [facility] OR 02 /  [facility] OR    Anesthesia Start: 0908 Anesthesia Stop: 1022    Procedure: APPENDECTOMY LAPAROSCOPIC (N/A Abdomen) Diagnosis:       Acute appendicitis with localized peritonitis, unspecified whether abscess present, unspecified whether gangrene present, unspecified whether perforation present      (Acute appendicitis with localized peritonitis, unspecified whether abscess present, unspecified whether gangrene present, unspecified whether perforation present [K35.30])    Surgeons: Babar Perkins MD Provider: Isai Harrington MD    Anesthesia Type: general with block ASA Status: 3 - Emergent          Anesthesia Type: general with block    Vitals  Vitals Value Taken Time   /80 03/08/22 1028   Temp 97 °F (36.1 °C) 03/08/22 1023   Pulse 68 03/08/22 1028   Resp 19 03/08/22 1028   SpO2 92 % 03/08/22 1028           Post Anesthesia Care and Evaluation    Patient location during evaluation: PACU  Patient participation: complete - patient participated  Level of consciousness: responsive to verbal stimuli  Pain score: 0  Pain management: adequate  Airway patency: patent  Anesthetic complications: No anesthetic complications  PONV Status: none  Cardiovascular status: hemodynamically stable  Respiratory status: nasal cannula  Hydration status: acceptable

## 2022-03-08 NOTE — DISCHARGE SUMMARY
Discharge Summary    Patient Name:  Meghan Rivas  YOB: 1975  [id number]      DATE OF ADMISSION: 3/7/2022    DATE OF DISCHARGE: 3/8/2022       FINAL DIAGNOSES:   Patient Active Problem List   Diagnosis   • Chronic hepatitis C without hepatic coma (HCC)   • MVP (mitral valve prolapse)   • Hx of drug abuse (HCC)   • Leg neuralgia, right   • Anxiety and depression   • Gastroesophageal reflux disease   • Syncope   • Acute appendicitis       CONSULTING SERVICES: None      PROCEDURES PERFORMED:   1.  Laparoscopic appendectomy on 3/8    HISTORY: The patient is a very pleasant 46 y.o. female smoker who presented to [facility] with abdominal pain and vomiting since 3/5.  ER work-up was concerning for appendicitis.      HOSPITAL COURSE: Patient was placed on IV antibiotics and admitted to the general surgery service.  On 3/8, the patient was taken to the operating room for laparoscopic appendectomy which he tolerated well.  She was advanced to regular diet and discharged postoperatively     CONDITION: At the time of discharge, the patient is tolerating a regular diet without difficulty. she is having normal  bladder function. her incisions are clean, dry and intact.      DISCHARGE MEDICATIONS:   1. All previous home medications.   2. Tramadol 50 mg PO q4h PRN severe pain  3.  Acetaminophen 1000 mg q6h and ibuprofen 600 mg q6h alternating for baseline pain control       DISCHARGE INSTRUCTIONS:  1. The patient is instructed to follow up with Dr. Perkins in 2 weeks.  2. she is instructed to refrain from lifting more than 15 or 20 pounds for 6 weeks to avoid development of incisional hernia.  3. If the patient has worsening problems with fever >101.5, nausea or vomiting affecting oral intake or causing dehydration, she is to contact Dr. Perkins's office   4. Shower daily. Allow warm, soapy water to run over incisions. Pat dry.        -Do not submerge wounds in a tub bath or swimming for 2 weeks  5.  Narcotics can cause constipation so it is important to wean off of the medications as soon as possible.        Babar Perkins MD  3/8/2022  10:48 EST

## 2022-03-08 NOTE — DISCHARGE INSTR - APPOINTMENTS
Please return to [facility] for a 2 week follow up appointment with Dr. Perkins on Thursday March 24 at 2 pm.

## 2022-03-08 NOTE — PLAN OF CARE
Problem: Adult Inpatient Plan of Care  Goal: Plan of Care Review  Outcome: Adequate for Care Transition  Flowsheets (Taken 3/8/2022 1053 by Anabell Lozano RN)  Plan of Care Reviewed With: patient  Goal: Patient-Specific Goal (Individualized)  Outcome: Adequate for Care Transition  Goal: Absence of Hospital-Acquired Illness or Injury  Outcome: Adequate for Care Transition  Intervention: Identify and Manage Fall Risk  Recent Flowsheet Documentation  Taken 3/8/2022 0815 by Mariam Pierson, RN  Safety Promotion/Fall Prevention: safety round/check completed  Intervention: Prevent and Manage VTE (Venous Thromboembolism) Risk  Recent Flowsheet Documentation  Taken 3/8/2022 0815 by Mariam Pierson, RN  Activity Management: up ad sd  Taken 3/8/2022 0800 by Mariam Pierson RN  VTE Prevention/Management:   bilateral   sequential compression devices on  Intervention: Prevent Infection  Recent Flowsheet Documentation  Taken 3/8/2022 0815 by Mariam Pierson, RN  Infection Prevention:   single patient room provided   visitors restricted/screened  Goal: Optimal Comfort and Wellbeing  Outcome: Adequate for Care Transition  Goal: Readiness for Transition of Care  Outcome: Adequate for Care Transition   Goal Outcome Evaluation:

## 2022-03-08 NOTE — H&P
Patient Name:  Meghan Rivas  YOB: 1975  [id number]       Patient Care Team:  Salome Li MD as PCP - General (Family Medicine)      General Surgery Consult Note     Date of Consultation: 03/07/22    Consulting Physician - Jayden Irby MD    Reason for Consult - Abdominal pain    Subjective     I have been asked to see  Meghan Rivas , a 46 y.o. female smoker, history of chronic HCV and drug abuse, in consultation for abdominal pain.  The patient states she developed lower abdominal/right lower quadrant abdominal pain on Saturday associated with vomiting and nausea.  Recently she is also had some constipation.  She had never had the symptoms before.  As the symptoms progressively worsened she was seen by her primary care physician who then told her to present to the emergency department.      Allergy:   Allergies   Allergen Reactions   • Contrast Dye Anaphylaxis     Tolerated IV contrast CT 3/7/2022       Medications:  acetaminophen, 1,000 mg, Oral, Q6H  famotidine, 20 mg, Intravenous, BID  heparin (porcine), 5,000 Units, Subcutaneous, Q8H  ketorolac, 15 mg, Intravenous, Q6H  piperacillin-tazobactam, 3.375 g, Intravenous, Q6H      dextrose 5 % and lactated Ringer's, 100 mL/hr      No current facility-administered medications on file prior to encounter.     Current Outpatient Medications on File Prior to Encounter   Medication Sig   • albuterol sulfate  (90 Base) MCG/ACT inhaler Inhale 2 puffs Every 6 (Six) Hours As Needed for Wheezing.   • ARIPiprazole (ABILIFY) 5 MG tablet Take 1 tablet by mouth Daily.   • levothyroxine (Synthroid) 50 MCG tablet Take 1 tablet by mouth Every Morning.   • LORazepam (Ativan) 0.5 MG tablet Take 1 tablet by mouth Daily As Needed for Anxiety.   • nitrofurantoin, macrocrystal-monohydrate, (Macrobid) 100 MG capsule Take 1 capsule by mouth 2 (Two) Times a Day.   • omeprazole (PrilOSEC) 40 MG capsule Take 1 capsule by mouth Daily.   • prazosin (MINIPRESS)  "1 MG capsule Take 1 capsule by mouth Every Night.   • propranolol (INDERAL) 10 MG tablet Take 1 to 2 tablets PO daily as needed 30 minutes to an hour before events for situational anxiety   • psyllium (Metamucil Smooth Texture) 58.6 % powder Take  by mouth 2 (two) times a day.   • venlafaxine XR (Effexor XR) 75 MG 24 hr capsule Take 1 capsule by mouth Daily.       PMHx:   Past Medical History:   Diagnosis Date   • Anxiety    • Depression    • Hepatitis C    • History of drug abuse (HCC)    • Seizures (HCC)          Past Surgical History:  Ovarian cyst surgery    Family History: Denies    Social History: Half pack per day smoker.  Last methamphetamine or illicit drug use 5 years ago     Review of Systems        Constitutional: No fevers, chills or malaise. No unintentional weight loss   Eyes: Denies visual changes    Cardiovascular: Denies chest pain, palpitations   Respiratory: Denies cough or shortness of breath   Abdominal/Gastrointestinal: Reports abdominal pain, nausea, vomiting and constipation   Genitourinary: Denies dysuria or hematuria   Musculoskeletal: Denies any but chronic joint aches, pains or deformities              Skin: No lesions or rashes   Psychiatric: No recent mood changes   Neurologic: No paresthesias or loss of function             Objective     Physical Exam:      Vital Signs  /69 (BP Location: Right arm, Patient Position: Lying)   Pulse 73   Temp 98.3 °F (36.8 °C)   Resp 16   Ht 170.2 cm (67\")   Wt 86.2 kg (190 lb)   LMP 02/20/2022   SpO2 96%   BMI 29.76 kg/m²   No intake or output data in the 24 hours ending 03/07/22 2120      Physical Exam:      03/07/22  1430   Weight: 86.2 kg (190 lb)    Body mass index is 29.76 kg/m².  Constitution: No acute distress  Head: Normocephalic, atraumatic.   Eyes: Aligned without strabismus. Conjunctiva noninjected   Ears, Nose, Mouth: , No lesions appreciated   Respiratory: Symmetric chest expansion. No respiratory distress. "   Gastrointestinal:  soft, nondistended, tender to palpation in the right lower quadrant  Skin:  No cyanosis, clubbing or edema bilaterally    Lymphatics: No abnormal cervical or supraclavicular adenopathy  Neurologic: No gross deficits.  Alert and oriented x3  Psychiatric: Normal mood        Results Review: I have personally reviewed all of the recent lab and imaging results available at this time.     Lab Results (last 24 hours)     Procedure Component Value Units Date/Time    POCT pregnancy, urine [[id number]] Collected: 03/07/22 1322    Specimen: Urine Updated: 03/07/22 1323     HCG, Urine, QL Negative     Lot Number [id number]     Internal Positive Control Passed     Internal Negative Control Passed     Expiration Date 2023-04-03    POCT urinalysis dipstick, automated [[id number]]  (Abnormal) Collected: 03/07/22 1325    Specimen: Urine Updated: 03/07/22 1327     Color Yellow     Clarity, UA Clear     Specific Gravity  1.025     pH, Urine 6.0     Leukocytes Negative     Nitrite, UA Negative     Protein, POC Trace mg/dL      Glucose, UA Negative mg/dL      Ketones, UA Negative     Urobilinogen, UA Normal     Bilirubin Negative     Blood, UA Negative     Lot Number [id number]     Expiration Date 09/16/2022    CBC & Differential [[id number]]  (Abnormal) Collected: 03/07/22 1713    Specimen: Blood from Arm, Left Updated: 03/07/22 1724    Narrative:      The following orders were created for panel order CBC & Differential.  Procedure                               Abnormality         Status                     ---------                               -----------         ------                     CBC Auto Differential[[id number]]        Abnormal            Final result                 Please view results for these tests on the individual orders.    Comprehensive Metabolic Panel [[id number]]  (Abnormal) Collected: 03/07/22 1713    Specimen: Blood from Arm, Left Updated: 03/07/22 1810     Glucose 103 mg/dL      BUN 12  mg/dL      Creatinine 0.70 mg/dL      Sodium 139 mmol/L      Potassium 3.8 mmol/L      Chloride 105 mmol/L      CO2 22.0 mmol/L      Calcium 9.4 mg/dL      Total Protein 8.1 g/dL      Albumin 4.16 g/dL      ALT (SGPT) 10 U/L      AST (SGOT) 25 U/L      Alkaline Phosphatase 51 U/L      Total Bilirubin 0.4 mg/dL      Globulin 3.9 gm/dL      A/G Ratio 1.1 g/dL      BUN/Creatinine Ratio 17.1     Anion Gap 12.0 mmol/L      eGFR 108.2 mL/min/1.73      Comment: National Kidney Foundation and American Society of Nephrology (ASN) Task Force recommended calculation based on the Chronic Kidney Disease Epidemiology Collaboration (CKD-EPI) equation refit without adjustment for race.       Narrative:      GFR Normal >60  Chronic Kidney Disease <60  Kidney Failure <15      Lipase [[id number]]  (Normal) Collected: 03/07/22 1713    Specimen: Blood from Arm, Left Updated: 03/07/22 1810     Lipase 15 U/L     Blood Culture - Blood, Arm, Left [[id number]] Collected: 03/07/22 1713    Specimen: Blood from Arm, Left Updated: 03/07/22 1721    Blood Culture - Blood, Arm, Right [[id number]] Collected: 03/07/22 1713    Specimen: Blood from Arm, Right Updated: 03/07/22 1721    Lactic Acid, Plasma [[id number]]  (Normal) Collected: 03/07/22 1713    Specimen: Blood from Arm, Left Updated: 03/07/22 1746     Lactate 0.8 mmol/L     Procalcitonin [[id number]]  (Normal) Collected: 03/07/22 1713    Specimen: Blood from Arm, Left Updated: 03/07/22 1757     Procalcitonin 0.03 ng/mL     Narrative:      As a Marker for Sepsis (Non-Neonates):    1. <0.5 ng/mL represents a low risk of severe sepsis and/or septic shock.  2. >2 ng/mL represents a high risk of severe sepsis and/or septic shock.    As a Marker for Lower Respiratory Tract Infections that require antibiotic therapy:    PCT on Admission    Antibiotic Therapy       6-12 Hrs later    >0.5                Strongly Recommended  >0.25 - <0.5        Recommended   0.1 - 0.25          Discouraged               "Remeasure/reassess PCT  <0.1                Strongly Discouraged     Remeasure/reassess PCT    As 28 day mortality risk marker: \"Change in Procalcitonin Result\" (>80% or <=80%) if Day 0 (or Day 1) and Day 4 values are available. Refer to http://www.[facility]-pct-calculator.com    Change in PCT <=80%  A decrease of PCT levels below or equal to 80% defines a positive change in PCT test result representing a higher risk for 28-day all-cause mortality of patients diagnosed with severe sepsis for septic shock.    Change in PCT >80%  A decrease of PCT levels of more than 80% defines a negative change in PCT result representing a lower risk for 28-day all-cause mortality of patients diagnosed with severe sepsis or septic shock.       C-reactive Protein [[id number]]  (Abnormal) Collected: 03/07/22 1713    Specimen: Blood from Arm, Left Updated: 03/07/22 1810     C-Reactive Protein 3.30 mg/dL     Magnesium [[id number]]  (Normal) Collected: 03/07/22 1713    Specimen: Blood from Arm, Left Updated: 03/07/22 1810     Magnesium 1.9 mg/dL     CBC Auto Differential [[id number]]  (Abnormal) Collected: 03/07/22 1713    Specimen: Blood from Arm, Left Updated: 03/07/22 1724     WBC 7.03 10*3/mm3      RBC 4.48 10*6/mm3      Hemoglobin 13.2 g/dL      Hematocrit 39.2 %      MCV 87.5 fL      MCH 29.5 pg      MCHC 33.7 g/dL      RDW 13.9 %      RDW-SD 44.4 fl      MPV 9.5 fL      Platelets 221 10*3/mm3      Neutrophil % 44.4 %      Lymphocyte % 45.1 %      Monocyte % 7.3 %      Eosinophil % 2.4 %      Basophil % 0.7 %      Immature Grans % 0.1 %      Neutrophils, Absolute 3.12 10*3/mm3      Lymphocytes, Absolute 3.17 10*3/mm3      Monocytes, Absolute 0.51 10*3/mm3      Eosinophils, Absolute 0.17 10*3/mm3      Basophils, Absolute 0.05 10*3/mm3      Immature Grans, Absolute 0.01 10*3/mm3      nRBC 0.0 /100 WBC     COVID PRE-OP / PRE-PROCEDURE SCREENING ORDER (NO ISOLATION) - Swab, Nasopharynx [[id number]]  (Normal) Collected: 03/07/22 1714    " Specimen: Swab from Nasopharynx Updated: 03/07/22 1811    Narrative:      The following orders were created for panel order COVID PRE-OP / PRE-PROCEDURE SCREENING ORDER (NO ISOLATION) - Swab, Nasopharynx.  Procedure                               Abnormality         Status                     ---------                               -----------         ------                     COVID-19 and FLU A/B PCR...[[id number]]  Normal              Final result                 Please view results for these tests on the individual orders.    COVID-19 and FLU A/B PCR - Swab, Nasopharynx [[id number]]  (Normal) Collected: 03/07/22 1714    Specimen: Swab from Nasopharynx Updated: 03/07/22 1811     COVID19 Not Detected     Influenza A PCR Not Detected     Influenza B PCR Not Detected    Narrative:      Fact sheet for providers: https://www.fda.gov/media/573382/download    Fact sheet for patients: https://www.fda.gov/media/233595/download    Test performed by PCR.         I have personally reviewed the patient's CT abd/pelvis with IV contrast. There appears to be dilation of the fluid filled appendix in the RLQ with some mild stranding     Assessment/Plan   46 year old female with acute appendicitis    -NPO at [facility]  -To the OR tomorrow for laparoscopic appendectomy  -PO and IV pain meds  -Zofran PRN  -IVF resuscitation    Disp: Obs. Discharge postop      Babar Perkins MD  [facility]  03/07/22  21:20 EST

## 2022-03-08 NOTE — OP NOTE
Operative Report    Patient Name:  Meghan Rivas  YOB: 1975  [id number]    3/8/2022      PREOPERATIVE DIAGNOSIS: Acute appendicitis       POSTOPERATIVE DIAGNOSIS: Same        PROCEDURE PERFORMED: Laparoscopic Appendectomy        SURGEON: Babar Perkins MD         SPECIMENS: Appendix and contents        ANESTHESIA: General.  Local    Grafts/Implants: None     FINDINGS:  1. Acutely inflamed appendix without  perforation   2. Based on hyperemia and dilation of the appendix, infection was present without  spillage of enteric contents       INDICATIONS:      The patient is a 46 y.o. female with a history of right lower quadrant abdominal pain, concerning for acute appendicitis . Pre-operative imaging including CT scan  confirmed the diagnosis. The risks and benefits of laparoscopic appendectomy, possible open were discussed with the patient and their family and they agree to proceed.            DESCRIPTION OF PROCEDURE:      After obtaining informed consent, the patient was taken to the operating room and placed in the supine position. After appropriate DVT and antibiotic prophylaxis, general anesthesia was induced. The abdomen was prepped and draped in standard sterile fashion.      After administration of local anesthetic, a stab incision was made at Ivey's point in the left upper quadrant.  Abdomen was entered in the left upper quadrant with 5 mm applied first entry trocar gasless.  The abdomen was insufflated to 15 mmHg. The abdomen was then entered with a 5 mm Optiview trocar at umbilicus.   Left lower quadrant 5mm trocar placed under direct laparoscopic visualization. Umbilical trocar exchanged for 12 mm, directed to the right paramedian to avoid the patient's large diastases. Additional suprapubic 5 mm trocar was placed under direct laparoscopic visualization.      The right lower quadrant was inspected. There was an acutely inflamed appendix identified that was hyperemic and dilated.  There  appeared to be a small dilation at the base which may have been a fecalith but unclear at this time.  I was able to get circumferential control around the base of the appendix utilizing combination of blunt and sharp dissection.  I transected the base with laparoscopic stapler, taking a wedge of cecum to incorporate the area of dilation at the base of the appendix in case this was a mass.  An additional firing of the stapler was taken across the appendiceal mesentery, the appendix placed in an Endo Catch bag, and removed through the infraumbilical trocar site. The specimen was examined on the back table, was complete, and passed off as specimen.     The mesenteric and cecal staple lines were inspected, and free of bleeding or leak.  There had been a small amount of oozing initially but this stopped on its own.  Ricarda was placed in the right lower quadrant to minimize risk of perioperative fluid collection.    The fascia at the periumbilical incision  was closed with a 0 Vicryl in a figure of 8 manner utilizing the Alen Keli device.  Right lower quadrant was examined and there were no signs of bleeding. Trocars removed. skin  closed  using absorbable subcuticular suture. Skin Affix placed on incisions. The patient recovered from anesthesia, was extubated in the operating room, and transferred to the PACU in stable condition.  All sponge and needle counts were correct times two at the completion of the procedure. There were no immediate complications.     Estimated blood loss 10 cc        Babar Perkins MD  3/8/2022  10:23 EST

## 2022-03-09 ENCOUNTER — TRANSITIONAL CARE MANAGEMENT TELEPHONE ENCOUNTER (OUTPATIENT)
Dept: CALL CENTER | Facility: HOSPITAL | Age: 47
End: 2022-03-09

## 2022-03-09 ENCOUNTER — DOCUMENTATION (OUTPATIENT)
Dept: SURGERY | Facility: CLINIC | Age: 47
End: 2022-03-09

## 2022-03-09 NOTE — OUTREACH NOTE
Call Center TCM Note    Flowsheet Row Responses   [facility] patient discharged from? [place]   Does the patient have one of the following disease processes/diagnoses(primary or secondary)? General Surgery   TCM attempt successful? Yes   Discharge diagnosis Laparoscopic appendectomy    Meds reviewed with patient/caregiver? Yes   Is the patient having any side effects they believe may be caused by any medication additions or changes? No   Does the patient have all medications related to this admission filled (includes all antibiotics, pain medications, etc.) Yes   Is the patient taking all medications as directed (includes completed medication regime)? Yes   Does the patient have a follow up appointment scheduled with their surgeon? Yes   Has the patient kept scheduled appointments due by today? N/A   Has home health visited the patient within 72 hours of discharge? N/A   Psychosocial issues? No   Did the patient receive a copy of their discharge instructions? Yes   Nursing interventions Reviewed instructions with patient   What is the patient's perception of their health status since discharge? Improving   Nursing interventions Nurse provided patient education   Is the patient /caregiver able to teach back basic post-op care? Continue use of incentive spirometry at least 1 week post discharge, Take showers only when approved by MD-sponge bathe until then, Do not remove steri-strips, Lifting as instructed by MD in discharge instructions, No tub bath, swimming, or hot tub until instructed by MD, Practice 'cough and deep breath', Drive as instructed by MD in discharge instructions, Keep incision areas clean,dry and protected   Is the patient/caregiver able to teach back signs and symptoms of incisional infection? Incisional warmth, Increased drainage or bleeding, Fever, Pus or odor from incision, Increased redness, swelling or pain at the incisonal site   Is the patient/caregiver able to teach back steps to  recovery at home? Set small, achievable goals for return to baseline health, Practice good oral hygiene, Rest and rebuild strength, gradually increase activity, Eat a well-balance diet, Make a list of questions for surgeon's appointment, Weigh daily   Is the patient/caregiver able to teach back the hierarchy of who to call/visit for symptoms/problems? PCP, Specialist, Home health nurse, Urgent Care, ED, 911 Yes   TCM call completed? Yes   Wrap up additional comments Pt doing fair, Tramadol not controlling post op pain from appendectomy. Pt is going to call surgeon. Little appetite but no issues with eating/drinking. No fever, chills. POST OP is 0324/2022. Pt wishes to keep sched fwp with PCP on 04/05/2022.          Liza Keita MA    3/9/2022, 14:42 EST

## 2022-03-09 NOTE — OUTREACH NOTE
Prep Survey    Flowsheet Row Responses   [facility] patient discharged from? [place]   Is LACE score < 7 ? Yes   Emergency Room discharge w/ pulse ox? No   Eligibility [facility]   Date of Admission 03/07/22   Date of Discharge 03/08/22   Discharge Disposition Home or Self Care   Discharge diagnosis Laparoscopic appendectomy    Does the patient have one of the following disease processes/diagnoses(primary or secondary)? General Surgery   Does the patient have Home health ordered? No   Is there a DME ordered? No   Prep survey completed? Yes          DESIREE [employer] - Registered Nurse

## 2022-03-09 NOTE — PROGRESS NOTES
Patient called in complaining of pain.  She stated she had been lying all day and not up walking around.  I told her to get up and move around and see if that helps.  She is to go to ER if pain worsens, fever, or nausea and vomiting.  Patient voiced understanding.

## 2022-03-11 LAB
LAB AP CASE REPORT: NORMAL
PATH REPORT.FINAL DX SPEC: NORMAL

## 2022-03-12 LAB
BACTERIA SPEC AEROBE CULT: NORMAL
BACTERIA SPEC AEROBE CULT: NORMAL

## 2022-03-23 ENCOUNTER — DISEASE STATE MANAGEMENT VISIT (OUTPATIENT)
Dept: PHARMACY | Facility: HOSPITAL | Age: 47
End: 2022-03-23

## 2022-03-23 VITALS
SYSTOLIC BLOOD PRESSURE: 116 MMHG | WEIGHT: 193 LBS | HEART RATE: 86 BPM | BODY MASS INDEX: 30.29 KG/M2 | HEIGHT: 67 IN | DIASTOLIC BLOOD PRESSURE: 76 MMHG

## 2022-03-23 DIAGNOSIS — B18.2 CHRONIC HEPATITIS C WITHOUT HEPATIC COMA: Primary | ICD-10-CM

## 2022-03-23 LAB
AMPHET+METHAMPHET UR QL: NEGATIVE
AMPHETAMINES UR QL: NEGATIVE
BARBITURATES UR QL SCN: NEGATIVE
BENZODIAZ UR QL SCN: NEGATIVE
BUPRENORPHINE SERPL-MCNC: POSITIVE NG/ML
CANNABINOIDS SERPL QL: POSITIVE
COCAINE UR QL: NEGATIVE
METHADONE UR QL SCN: NEGATIVE
OPIATES UR QL: NEGATIVE
OXYCODONE UR QL SCN: NEGATIVE
PCP UR QL SCN: NEGATIVE
PROPOXYPH UR QL: NEGATIVE
TRICYCLICS UR QL SCN: NEGATIVE

## 2022-03-23 PROCEDURE — 87522 HEPATITIS C REVRS TRNSCRPJ: CPT

## 2022-03-23 PROCEDURE — 86708 HEPATITIS A ANTIBODY: CPT

## 2022-03-23 PROCEDURE — 84703 CHORIONIC GONADOTROPIN ASSAY: CPT

## 2022-03-23 PROCEDURE — 81596 NFCT DS CHRNC HCV 6 ASSAYS: CPT

## 2022-03-23 PROCEDURE — 86706 HEP B SURFACE ANTIBODY: CPT

## 2022-03-23 PROCEDURE — 87902 NFCT AGT GNTYP ALYS HEP C: CPT

## 2022-03-23 PROCEDURE — 80306 DRUG TEST PRSMV INSTRMNT: CPT

## 2022-03-23 PROCEDURE — 86704 HEP B CORE ANTIBODY TOTAL: CPT

## 2022-03-23 PROCEDURE — 82105 ALPHA-FETOPROTEIN SERUM: CPT

## 2022-03-23 PROCEDURE — 87340 HEPATITIS B SURFACE AG IA: CPT

## 2022-03-23 PROCEDURE — G0432 EIA HIV-1/HIV-2 SCREEN: HCPCS

## 2022-03-23 PROCEDURE — 80053 COMPREHEN METABOLIC PANEL: CPT

## 2022-03-23 PROCEDURE — 85025 COMPLETE CBC W/AUTO DIFF WBC: CPT

## 2022-03-24 LAB
ALBUMIN SERPL-MCNC: 4.5 G/DL (ref 3.5–5.2)
ALBUMIN/GLOB SERPL: 1.3 G/DL
ALP SERPL-CCNC: 54 U/L (ref 39–117)
ALPHA-FETOPROTEIN: 2.17 NG/ML (ref 0–8.3)
ALT SERPL W P-5'-P-CCNC: 10 U/L (ref 1–33)
ANION GAP SERPL CALCULATED.3IONS-SCNC: 11.4 MMOL/L (ref 5–15)
AST SERPL-CCNC: 29 U/L (ref 1–32)
BASOPHILS # BLD AUTO: 0.05 10*3/MM3 (ref 0–0.2)
BASOPHILS NFR BLD AUTO: 0.9 % (ref 0–1.5)
BILIRUB SERPL-MCNC: 0.3 MG/DL (ref 0–1.2)
BUN SERPL-MCNC: 11 MG/DL (ref 6–20)
BUN/CREAT SERPL: 15.5 (ref 7–25)
CALCIUM SPEC-SCNC: 9.7 MG/DL (ref 8.6–10.5)
CHLORIDE SERPL-SCNC: 103 MMOL/L (ref 98–107)
CO2 SERPL-SCNC: 22.6 MMOL/L (ref 22–29)
CREAT SERPL-MCNC: 0.71 MG/DL (ref 0.57–1)
DEPRECATED RDW RBC AUTO: 40.8 FL (ref 37–54)
EGFRCR SERPLBLD CKD-EPI 2021: 106.3 ML/MIN/1.73
EOSINOPHIL # BLD AUTO: 0.28 10*3/MM3 (ref 0–0.4)
EOSINOPHIL NFR BLD AUTO: 5.1 % (ref 0.3–6.2)
ERYTHROCYTE [DISTWIDTH] IN BLOOD BY AUTOMATED COUNT: 13.4 % (ref 12.3–15.4)
GLOBULIN UR ELPH-MCNC: 3.6 GM/DL
GLUCOSE SERPL-MCNC: 114 MG/DL (ref 65–99)
HBV SURFACE AB SER RIA-ACNC: REACTIVE
HBV SURFACE AG SERPL QL IA: NORMAL
HCG SERPL QL: NEGATIVE
HCT VFR BLD AUTO: 38.6 % (ref 34–46.6)
HGB BLD-MCNC: 13 G/DL (ref 12–15.9)
HIV1+2 AB SER QL: NORMAL
IMM GRANULOCYTES # BLD AUTO: 0.01 10*3/MM3 (ref 0–0.05)
IMM GRANULOCYTES NFR BLD AUTO: 0.2 % (ref 0–0.5)
LYMPHOCYTES # BLD AUTO: 2.42 10*3/MM3 (ref 0.7–3.1)
LYMPHOCYTES NFR BLD AUTO: 44.2 % (ref 19.6–45.3)
MCH RBC QN AUTO: 28.8 PG (ref 26.6–33)
MCHC RBC AUTO-ENTMCNC: 33.7 G/DL (ref 31.5–35.7)
MCV RBC AUTO: 85.4 FL (ref 79–97)
MONOCYTES # BLD AUTO: 0.48 10*3/MM3 (ref 0.1–0.9)
MONOCYTES NFR BLD AUTO: 8.8 % (ref 5–12)
NEUTROPHILS NFR BLD AUTO: 2.23 10*3/MM3 (ref 1.7–7)
NEUTROPHILS NFR BLD AUTO: 40.8 % (ref 42.7–76)
NRBC BLD AUTO-RTO: 0 /100 WBC (ref 0–0.2)
PLATELET # BLD AUTO: 279 10*3/MM3 (ref 140–450)
PMV BLD AUTO: 10.2 FL (ref 6–12)
POTASSIUM SERPL-SCNC: 4 MMOL/L (ref 3.5–5.2)
PROT SERPL-MCNC: 8.1 G/DL (ref 6–8.5)
RBC # BLD AUTO: 4.52 10*6/MM3 (ref 3.77–5.28)
SODIUM SERPL-SCNC: 137 MMOL/L (ref 136–145)
WBC NRBC COR # BLD: 5.47 10*3/MM3 (ref 3.4–10.8)

## 2022-03-25 DIAGNOSIS — F41.1 GAD (GENERALIZED ANXIETY DISORDER): ICD-10-CM

## 2022-03-25 DIAGNOSIS — F43.12 CHRONIC POST-TRAUMATIC STRESS DISORDER (PTSD): ICD-10-CM

## 2022-03-25 DIAGNOSIS — F40.10 SOCIAL ANXIETY DISORDER: ICD-10-CM

## 2022-03-25 LAB
HAV AB SER QL IA: NEGATIVE
HBV CORE AB SERPL QL IA: POSITIVE

## 2022-03-25 RX ORDER — LORAZEPAM 0.5 MG/1
0.5 TABLET ORAL DAILY PRN
Qty: 28 TABLET | Refills: 0 | Status: SHIPPED | OUTPATIENT
Start: 2022-03-25 | End: 2022-05-24 | Stop reason: SDUPTHER

## 2022-03-25 RX ORDER — ARIPIPRAZOLE 5 MG/1
5 TABLET ORAL NIGHTLY
Qty: 30 TABLET | Refills: 2 | Status: SHIPPED | OUTPATIENT
Start: 2022-03-25 | End: 2022-05-24 | Stop reason: SDUPTHER

## 2022-03-25 RX ORDER — VENLAFAXINE HYDROCHLORIDE 75 MG/1
75 CAPSULE, EXTENDED RELEASE ORAL NIGHTLY
Qty: 30 CAPSULE | Refills: 2 | Status: SHIPPED | OUTPATIENT
Start: 2022-03-25 | End: 2022-05-24 | Stop reason: SDUPTHER

## 2022-03-26 LAB
HCV GENTYP SERPL NAA+PROBE: NORMAL
HCV GENTYP SERPL NAA+PROBE: NORMAL
HCV RNA SERPL NAA+PROBE-ACNC: NORMAL IU/ML
HCV RNA SERPL NAA+PROBE-LOG IU: 5.25 LOG10 IU/ML
LABORATORY COMMENT REPORT: NORMAL
REF LAB TEST REF RANGE: NORMAL

## 2022-03-29 LAB
A2 MACROGLOB SERPL-MCNC: 272 MG/DL (ref 110–276)
ALT SERPL W P-5'-P-CCNC: 9 IU/L (ref 0–40)
APO A-I SERPL-MCNC: 115 MG/DL (ref 116–209)
BILIRUB SERPL-MCNC: 0.3 MG/DL (ref 0–1.2)
FIBROSIS SCORING:: ABNORMAL
FIBROSIS STAGE SERPL QL: ABNORMAL
GGT SERPL-CCNC: 15 IU/L (ref 0–60)
HAPTOGLOB SERPL-MCNC: 153 MG/DL (ref 42–296)
HCV AB SER QL: ABNORMAL
LABORATORY COMMENT REPORT: ABNORMAL
LIVER FIBR SCORE SERPL CALC.FIBROSURE: 0.19 (ref 0–0.21)
NECROINFLAMM ACTIVITY SCORING:: ABNORMAL
NECROINFLAMMATORY ACT GRADE SERPL QL: ABNORMAL
NECROINFLAMMATORY ACT SCORE SERPL: 0.02 (ref 0–0.17)
SERVICE CMNT-IMP: ABNORMAL

## 2022-04-13 ENCOUNTER — APPOINTMENT (OUTPATIENT)
Dept: PHARMACY | Facility: HOSPITAL | Age: 47
End: 2022-04-13

## 2022-04-18 ENCOUNTER — OFFICE VISIT (OUTPATIENT)
Dept: FAMILY MEDICINE CLINIC | Facility: CLINIC | Age: 47
End: 2022-04-18

## 2022-04-18 VITALS
OXYGEN SATURATION: 99 % | SYSTOLIC BLOOD PRESSURE: 116 MMHG | TEMPERATURE: 97.1 F | DIASTOLIC BLOOD PRESSURE: 78 MMHG | BODY MASS INDEX: 30.13 KG/M2 | WEIGHT: 192 LBS | HEART RATE: 68 BPM | HEIGHT: 67 IN

## 2022-04-18 DIAGNOSIS — Z12.31 ENCOUNTER FOR SCREENING MAMMOGRAM FOR BREAST CANCER: ICD-10-CM

## 2022-04-18 DIAGNOSIS — B18.2 CHRONIC HEPATITIS C WITHOUT HEPATIC COMA: ICD-10-CM

## 2022-04-18 DIAGNOSIS — R91.8 MASS OF LUNG: Primary | ICD-10-CM

## 2022-04-18 PROCEDURE — 99214 OFFICE O/P EST MOD 30 MIN: CPT | Performed by: FAMILY MEDICINE

## 2022-05-24 ENCOUNTER — TELEPHONE (OUTPATIENT)
Dept: PSYCHIATRY | Facility: CLINIC | Age: 47
End: 2022-05-24

## 2022-05-24 DIAGNOSIS — F40.10 SOCIAL ANXIETY DISORDER: ICD-10-CM

## 2022-05-24 DIAGNOSIS — F41.1 GAD (GENERALIZED ANXIETY DISORDER): ICD-10-CM

## 2022-05-24 DIAGNOSIS — F43.12 CHRONIC POST-TRAUMATIC STRESS DISORDER (PTSD): ICD-10-CM

## 2022-05-24 RX ORDER — LORAZEPAM 0.5 MG/1
0.5 TABLET ORAL DAILY PRN
Qty: 28 TABLET | Refills: 0 | Status: SHIPPED | OUTPATIENT
Start: 2022-05-24 | End: 2022-08-08 | Stop reason: SDUPTHER

## 2022-05-24 RX ORDER — VENLAFAXINE HYDROCHLORIDE 75 MG/1
75 CAPSULE, EXTENDED RELEASE ORAL NIGHTLY
Qty: 30 CAPSULE | Refills: 2 | Status: SHIPPED | OUTPATIENT
Start: 2022-05-24 | End: 2022-08-08 | Stop reason: SDUPTHER

## 2022-05-24 RX ORDER — ARIPIPRAZOLE 5 MG/1
5 TABLET ORAL NIGHTLY
Qty: 30 TABLET | Refills: 2 | Status: SHIPPED | OUTPATIENT
Start: 2022-05-24 | End: 2022-08-08 | Stop reason: SDUPTHER

## 2022-06-01 ENCOUNTER — PRIOR AUTHORIZATION (OUTPATIENT)
Dept: PSYCHIATRY | Facility: CLINIC | Age: 47
End: 2022-06-01

## 2022-06-02 NOTE — TELEPHONE ENCOUNTER
Approved on June 1  The request has been approved. The authorization is effective for a maximum of 6 fills from 06/01/2022 to 11/30/2022, as long as the member is enrolled in their current health plan. A written notification letter will follow with additional details.

## 2022-07-18 ENCOUNTER — OFFICE VISIT (OUTPATIENT)
Dept: FAMILY MEDICINE CLINIC | Facility: CLINIC | Age: 47
End: 2022-07-18

## 2022-07-18 VITALS
OXYGEN SATURATION: 99 % | HEART RATE: 63 BPM | RESPIRATION RATE: 17 BRPM | HEIGHT: 67 IN | SYSTOLIC BLOOD PRESSURE: 110 MMHG | DIASTOLIC BLOOD PRESSURE: 68 MMHG | WEIGHT: 182.4 LBS | BODY MASS INDEX: 28.63 KG/M2 | TEMPERATURE: 96.9 F

## 2022-07-18 DIAGNOSIS — R30.0 DYSURIA: ICD-10-CM

## 2022-07-18 DIAGNOSIS — R50.9 FEVER, UNSPECIFIED FEVER CAUSE: ICD-10-CM

## 2022-07-18 DIAGNOSIS — R15.9 INCONTINENCE OF FECES, UNSPECIFIED FECAL INCONTINENCE TYPE: Primary | ICD-10-CM

## 2022-07-18 DIAGNOSIS — K21.9 GASTROESOPHAGEAL REFLUX DISEASE: ICD-10-CM

## 2022-07-18 DIAGNOSIS — E03.9 ACQUIRED HYPOTHYROIDISM: ICD-10-CM

## 2022-07-18 LAB
BILIRUB BLD-MCNC: NEGATIVE MG/DL
CLARITY, POC: CLEAR
COLOR UR: YELLOW
EXPIRATION DATE: ABNORMAL
GLUCOSE UR STRIP-MCNC: NEGATIVE MG/DL
KETONES UR QL: NEGATIVE
LEUKOCYTE EST, POC: ABNORMAL
Lab: ABNORMAL
NITRITE UR-MCNC: NEGATIVE MG/ML
PH UR: 6 [PH] (ref 5–8)
PROT UR STRIP-MCNC: ABNORMAL MG/DL
RBC # UR STRIP: NEGATIVE /UL
SP GR UR: 1.02 (ref 1–1.03)
UROBILINOGEN UR QL: ABNORMAL

## 2022-07-18 PROCEDURE — 99214 OFFICE O/P EST MOD 30 MIN: CPT | Performed by: FAMILY MEDICINE

## 2022-07-18 PROCEDURE — 87088 URINE BACTERIA CULTURE: CPT | Performed by: FAMILY MEDICINE

## 2022-07-18 PROCEDURE — 87086 URINE CULTURE/COLONY COUNT: CPT | Performed by: FAMILY MEDICINE

## 2022-07-18 PROCEDURE — 87186 SC STD MICRODIL/AGAR DIL: CPT | Performed by: FAMILY MEDICINE

## 2022-07-18 RX ORDER — OMEPRAZOLE 40 MG/1
40 CAPSULE, DELAYED RELEASE ORAL DAILY
Qty: 30 CAPSULE | Refills: 2 | Status: SHIPPED | OUTPATIENT
Start: 2022-07-18

## 2022-07-18 RX ORDER — LEVOTHYROXINE SODIUM 0.05 MG/1
50 TABLET ORAL
Qty: 60 TABLET | Refills: 2 | Status: SHIPPED | OUTPATIENT
Start: 2022-07-18 | End: 2023-01-17

## 2022-07-18 RX ORDER — ALBUTEROL SULFATE 90 UG/1
2 AEROSOL, METERED RESPIRATORY (INHALATION) EVERY 6 HOURS PRN
Qty: 6.7 G | Refills: 2 | Status: SHIPPED | OUTPATIENT
Start: 2022-07-18

## 2022-07-19 ENCOUNTER — TELEPHONE (OUTPATIENT)
Dept: FAMILY MEDICINE CLINIC | Facility: CLINIC | Age: 47
End: 2022-07-19

## 2022-07-19 NOTE — TELEPHONE ENCOUNTER
GEN was okay. We need the labs. I would have her drink lots of water and then maybe go to the hospital and get them done. They are probably more experienced up there.

## 2022-07-19 NOTE — TELEPHONE ENCOUNTER
Caller: Meghan Rivas    Relationship: Self    Best call back number:[phone]    Caller requesting test results: MEGHAN     What test was performed:  XRAY OF STOMACH/CHEST    When was the test performed: 07/18/22    Where was the test performed: OFFICE     Additional notes: MEGHAN IS CALLING FOR THE RESULTS.    MEGHAN WAS NOT ABLE TO GIVE BLOOD YESTERDAY BECAUSE THEY COULDN'T FIND A VEIN.

## 2022-07-19 NOTE — TELEPHONE ENCOUNTER
GEN was okay. We need the labs. I would have her drink lots of water and then maybe go to the hospital and get them done. They are probably more experienced up there.    Spoke with patient & she verbalized understanding.

## 2022-07-20 ENCOUNTER — TELEPHONE (OUTPATIENT)
Dept: FAMILY MEDICINE CLINIC | Facility: CLINIC | Age: 47
End: 2022-07-20

## 2022-07-20 LAB — BACTERIA SPEC AEROBE CULT: ABNORMAL

## 2022-07-20 NOTE — TELEPHONE ENCOUNTER
Caller: Meghan Rivas    Relationship: Self    Best call back number: [phone]    What is the best time to reach you: ANY    Who are you requesting to speak with (clinical staff, provider,  specific staff member): CLINICAL    What was the call regarding: PATIENTS PARTNER CALLED TO STATE THAT PATIENT WAS SEEN 2 DAYS AGO FOR A UTI AND THERE WAS NO MEDICINE PRESCRIBED. PLEASE SENT AN ANTIBIOTIC, PATIENT IS IN A LOT PAIN.    Do you require a callback: YES WHEN MEDICINE IS READY

## 2022-07-20 NOTE — TELEPHONE ENCOUNTER
She didn't do her labs. How come she didn't do her labs? I need a kidney function to prescribe an appropriate antibiotic.      Spoke with patient she stated she cannot come in fir labs until Monday.

## 2022-07-20 NOTE — TELEPHONE ENCOUNTER
She didn't do her labs. How come she didn't do her labs? I need a kidney function to prescribe an appropriate antibiotic.

## 2022-07-21 RX ORDER — CIPROFLOXACIN 500 MG/1
500 TABLET, FILM COATED ORAL 2 TIMES DAILY
Qty: 14 TABLET | Refills: 0 | Status: SHIPPED | OUTPATIENT
Start: 2022-07-21 | End: 2022-10-04

## 2022-07-21 NOTE — TELEPHONE ENCOUNTER
Please let her know that I sent in cipro for her UTI, but I absolutely need her to complete the rest of her labs as I need to know her kidney function in order to completely treat her.

## 2022-07-25 ENCOUNTER — TELEPHONE (OUTPATIENT)
Dept: FAMILY MEDICINE CLINIC | Facility: CLINIC | Age: 47
End: 2022-07-25

## 2022-07-25 NOTE — TELEPHONE ENCOUNTER
Contacted the pt in regards to her Albuterol Inhaler and if it needed a PA. The pt stated that she had already picked it up with no problem. I informed the pt to contact us back if she had any problems in the future.

## 2022-08-01 ENCOUNTER — LAB (OUTPATIENT)
Dept: LAB | Facility: HOSPITAL | Age: 47
End: 2022-08-01

## 2022-08-01 ENCOUNTER — TELEPHONE (OUTPATIENT)
Dept: FAMILY MEDICINE CLINIC | Facility: CLINIC | Age: 47
End: 2022-08-01

## 2022-08-01 DIAGNOSIS — E03.9 ACQUIRED HYPOTHYROIDISM: ICD-10-CM

## 2022-08-01 DIAGNOSIS — R15.9 INCONTINENCE OF FECES, UNSPECIFIED FECAL INCONTINENCE TYPE: ICD-10-CM

## 2022-08-01 DIAGNOSIS — R30.0 DYSURIA: ICD-10-CM

## 2022-08-01 DIAGNOSIS — K21.9 GASTROESOPHAGEAL REFLUX DISEASE: ICD-10-CM

## 2022-08-01 DIAGNOSIS — R50.9 FEVER, UNSPECIFIED FEVER CAUSE: ICD-10-CM

## 2022-08-01 PROCEDURE — 36415 COLL VENOUS BLD VENIPUNCTURE: CPT

## 2022-08-01 PROCEDURE — 84439 ASSAY OF FREE THYROXINE: CPT

## 2022-08-01 PROCEDURE — 85025 COMPLETE CBC W/AUTO DIFF WBC: CPT

## 2022-08-01 PROCEDURE — 84443 ASSAY THYROID STIM HORMONE: CPT

## 2022-08-01 RX ORDER — DOCUSATE SODIUM 100 MG/1
100 CAPSULE, LIQUID FILLED ORAL 2 TIMES DAILY
Qty: 60 CAPSULE | Refills: 2 | Status: SHIPPED | OUTPATIENT
Start: 2022-08-01 | End: 2022-10-05 | Stop reason: HOSPADM

## 2022-08-01 RX ORDER — POLYETHYLENE GLYCOL 3350 17 G/17G
17 POWDER, FOR SOLUTION ORAL 2 TIMES DAILY
Qty: 100 EACH | Refills: 2 | Status: SHIPPED | OUTPATIENT
Start: 2022-08-01 | End: 2023-01-30

## 2022-08-01 NOTE — TELEPHONE ENCOUNTER
----- Message from Salome Li MD sent at 7/31/2022 11:41 PM EDT -----  Please call. Her xray shows a lot of poop. That may be why she is having leakage. What is she doing to help her poop? We may have to do a giant cleanout.

## 2022-08-01 NOTE — TELEPHONE ENCOUNTER
Patient notified and is agreeable to all she wants to know if you will send in any of the medications that you can to the pharmacy.

## 2022-08-01 NOTE — TELEPHONE ENCOUNTER
I think she will have to have more bowel movements than usual. She should be losing some pounds with the amount of stool she has in her. I would have her do BID metamucil, BID miralax, BID stool softener, daily linzess, and maybe daily MOM/prune juice until she really has a couple of good BMs. Also, yes, she should go ahead and schedule the colonoscopy then.

## 2022-08-01 NOTE — TELEPHONE ENCOUNTER
Using Metamucil when she can stomach it, also using sample that Dr. Li gave her and her . Wants to know if she should still have the colonoscopy done? She hasn't schedule it yet.

## 2022-08-01 NOTE — TELEPHONE ENCOUNTER
Is she having better BMs? It might be better for her; will definitely clean her out.    She is at present but is agreeable to having a Colonoscopy to see what is going on with her.

## 2022-08-02 LAB
BASOPHILS # BLD AUTO: 0.05 10*3/MM3 (ref 0–0.2)
BASOPHILS NFR BLD AUTO: 0.8 % (ref 0–1.5)
DEPRECATED RDW RBC AUTO: 48.5 FL (ref 37–54)
EOSINOPHIL # BLD AUTO: 0.34 10*3/MM3 (ref 0–0.4)
EOSINOPHIL NFR BLD AUTO: 5.1 % (ref 0.3–6.2)
ERYTHROCYTE [DISTWIDTH] IN BLOOD BY AUTOMATED COUNT: 14.9 % (ref 12.3–15.4)
HCT VFR BLD AUTO: 43.6 % (ref 34–46.6)
HGB BLD-MCNC: 14.3 G/DL (ref 12–15.9)
IMM GRANULOCYTES # BLD AUTO: 0.02 10*3/MM3 (ref 0–0.05)
IMM GRANULOCYTES NFR BLD AUTO: 0.3 % (ref 0–0.5)
LYMPHOCYTES # BLD AUTO: 2.62 10*3/MM3 (ref 0.7–3.1)
LYMPHOCYTES NFR BLD AUTO: 39.4 % (ref 19.6–45.3)
MCH RBC QN AUTO: 29 PG (ref 26.6–33)
MCHC RBC AUTO-ENTMCNC: 32.8 G/DL (ref 31.5–35.7)
MCV RBC AUTO: 88.4 FL (ref 79–97)
MONOCYTES # BLD AUTO: 0.77 10*3/MM3 (ref 0.1–0.9)
MONOCYTES NFR BLD AUTO: 11.6 % (ref 5–12)
NEUTROPHILS NFR BLD AUTO: 2.85 10*3/MM3 (ref 1.7–7)
NEUTROPHILS NFR BLD AUTO: 42.8 % (ref 42.7–76)
NRBC BLD AUTO-RTO: 0 /100 WBC (ref 0–0.2)
PLATELET # BLD AUTO: 220 10*3/MM3 (ref 140–450)
PMV BLD AUTO: 11 FL (ref 6–12)
RBC # BLD AUTO: 4.93 10*6/MM3 (ref 3.77–5.28)
T4 FREE SERPL-MCNC: 0.79 NG/DL (ref 0.93–1.7)
TSH SERPL DL<=0.05 MIU/L-ACNC: 6.19 UIU/ML (ref 0.27–4.2)
WBC NRBC COR # BLD: 6.65 10*3/MM3 (ref 3.4–10.8)

## 2022-08-08 ENCOUNTER — OFFICE VISIT (OUTPATIENT)
Dept: PSYCHIATRY | Facility: CLINIC | Age: 47
End: 2022-08-08

## 2022-08-08 ENCOUNTER — TELEPHONE (OUTPATIENT)
Dept: FAMILY MEDICINE CLINIC | Facility: CLINIC | Age: 47
End: 2022-08-08

## 2022-08-08 VITALS
SYSTOLIC BLOOD PRESSURE: 112 MMHG | BODY MASS INDEX: 28.56 KG/M2 | DIASTOLIC BLOOD PRESSURE: 76 MMHG | HEART RATE: 74 BPM | WEIGHT: 182 LBS | HEIGHT: 67 IN

## 2022-08-08 DIAGNOSIS — F40.10 SOCIAL ANXIETY DISORDER: ICD-10-CM

## 2022-08-08 DIAGNOSIS — F51.5 NIGHTMARES ASSOCIATED WITH CHRONIC POST-TRAUMATIC STRESS DISORDER: ICD-10-CM

## 2022-08-08 DIAGNOSIS — F12.10 CANNABIS ABUSE, EPISODIC: ICD-10-CM

## 2022-08-08 DIAGNOSIS — F43.12 CHRONIC POST-TRAUMATIC STRESS DISORDER (PTSD): Primary | ICD-10-CM

## 2022-08-08 DIAGNOSIS — F43.12 NIGHTMARES ASSOCIATED WITH CHRONIC POST-TRAUMATIC STRESS DISORDER: ICD-10-CM

## 2022-08-08 DIAGNOSIS — F41.1 GAD (GENERALIZED ANXIETY DISORDER): ICD-10-CM

## 2022-08-08 DIAGNOSIS — F17.200 NICOTINE USE DISORDER: ICD-10-CM

## 2022-08-08 PROCEDURE — 99214 OFFICE O/P EST MOD 30 MIN: CPT | Performed by: PSYCHIATRY & NEUROLOGY

## 2022-08-08 RX ORDER — ARIPIPRAZOLE 5 MG/1
5 TABLET ORAL NIGHTLY
Qty: 30 TABLET | Refills: 2 | Status: SHIPPED | OUTPATIENT
Start: 2022-08-08 | End: 2022-09-16 | Stop reason: SDUPTHER

## 2022-08-08 RX ORDER — VENLAFAXINE HYDROCHLORIDE 150 MG/1
150 CAPSULE, EXTENDED RELEASE ORAL DAILY
Qty: 30 CAPSULE | Refills: 2 | Status: SHIPPED | OUTPATIENT
Start: 2022-08-08 | End: 2022-09-16 | Stop reason: SDUPTHER

## 2022-08-08 RX ORDER — PRAZOSIN HYDROCHLORIDE 2 MG/1
2 CAPSULE ORAL NIGHTLY
Qty: 30 CAPSULE | Refills: 1 | Status: SHIPPED | OUTPATIENT
Start: 2022-08-08 | End: 2022-10-31

## 2022-08-08 RX ORDER — LORAZEPAM 0.5 MG/1
0.5 TABLET ORAL DAILY PRN
Qty: 28 TABLET | Refills: 0 | Status: SHIPPED | OUTPATIENT
Start: 2022-08-08 | End: 2022-12-16 | Stop reason: SDUPTHER

## 2022-08-08 RX ORDER — PROPRANOLOL HYDROCHLORIDE 20 MG/1
TABLET ORAL
Qty: 60 TABLET | Refills: 1 | Status: SHIPPED | OUTPATIENT
Start: 2022-08-08 | End: 2022-10-31 | Stop reason: SDUPTHER

## 2022-08-08 NOTE — TELEPHONE ENCOUNTER
Caller: Meghan Rivas    Relationship to patient: Self    Best call back number: [phone]    Patient is needing: PATIENT STATED THAT SHE WAS CALLING TO CHECK ON STATUS OF COLONOSCOPY AND TO SEE IF RESULTS FROM 08/01/22 WAS BACK     PLEASE ADVISE

## 2022-08-08 NOTE — PROGRESS NOTES
Subjective   Meghan Rivas is a 46 y.o. female who presents today for follow up    Chief Complaint: History of bipolar disorder, PTSD, substance abuse, depression, anxiety    History of Present Illness: Patient presenting today for follow-up.  Since last visit, she reports that she has not had any major changes.  She did move from [place] to [place] and does miss living in [place].  She continues to struggle with mood and anxiety symptoms intermittently.  She has some good days but then has some days in which she struggles significantly.  She also continues to struggle with nightmares.  Prazosin helped initially but she is having recurrent nightmares related to PTSD and trauma which cause difficulty with sleep for herself and for her  as she is acting out, screaming, moving, and being restless in her sleep.  She is not having any medication side effects.  She denies any issues with appetite.  She denies SI/HI/AVH.    The following portions of the patient's history were reviewed and updated as appropriate: allergies, current medications, past family history, past medical history, past social history, past surgical history and problem list.      Past Medical History:  Past Medical History:   Diagnosis Date   • Anxiety    • Depression    • Disease of thyroid gland    • GERD (gastroesophageal reflux disease)    • Hepatitis C    • History of drug abuse (HCC)    • Seizures (HCC)        Social History:  Social History     Socioeconomic History   • Marital status:    Tobacco Use   • Smoking status: Current Every Day Smoker     Packs/day: 0.25     Years: 3.50     Pack years: 0.87     Types: Cigarettes   • Smokeless tobacco: Never Used   Vaping Use   • Vaping Use: Never used   Substance and Sexual Activity   • Alcohol use: Not Currently     Comment: sober 4 years   • Drug use: Not Currently     Types: IV, Heroin, Methamphetamines     Comment: clean 4 years   • Sexual activity: Yes     Partners: Male       Family  History:  Family History   Adopted: Yes       Past Surgical History:  Past Surgical History:   Procedure Laterality Date   • APPENDECTOMY N/A 3/8/2022    Procedure: APPENDECTOMY LAPAROSCOPIC;  Surgeon: Babar Perkins MD;  Location: [facility];  Service: General;  Laterality: N/A;   • OVARIAN CYST SURGERY      x7 surgeries       Problem List:  Patient Active Problem List   Diagnosis   • Chronic hepatitis C without hepatic coma (HCC)   • MVP (mitral valve prolapse)   • Hx of drug abuse (HCC)   • Leg neuralgia, right   • Anxiety and depression   • Gastroesophageal reflux disease   • Syncope   • Acute appendicitis       Allergy:   Allergies   Allergen Reactions   • Contrast Dye Anaphylaxis     Tolerated IV contrast CT 3/7/2022        Current Medications:   Current Outpatient Medications   Medication Sig Dispense Refill   • albuterol sulfate  (90 Base) MCG/ACT inhaler Inhale 2 puffs Every 6 (Six) Hours As Needed for Wheezing. 6.7 g 2   • ARIPiprazole (ABILIFY) 5 MG tablet Take 1 tablet by mouth Every Night. 30 tablet 2   • ciprofloxacin (Cipro) 500 MG tablet Take 1 tablet by mouth 2 (Two) Times a Day. 14 tablet 0   • docusate sodium (Colace) 100 MG capsule Take 1 capsule by mouth 2 (Two) Times a Day. 60 capsule 2   • ibuprofen (ADVIL,MOTRIN) 600 MG tablet Take 1 tablet by mouth Every 6 (Six) Hours As Needed for Mild Pain . 20 tablet 0   • levothyroxine (Synthroid) 50 MCG tablet Take 1 tablet by mouth Every Morning. 60 tablet 2   • linaclotide (LINZESS) 145 MCG capsule capsule Take 1 capsule by mouth Every Morning Before Breakfast. 30 capsule 2   • LORazepam (Ativan) 0.5 MG tablet Take 1 tablet by mouth Daily As Needed for Anxiety. 28 tablet 0   • omeprazole (PrilOSEC) 40 MG capsule Take 1 capsule by mouth Daily. 30 capsule 2   • polyethylene glycol (MIRALAX) 17 g packet Take 17 g by mouth 2 (Two) Times a Day. 100 each 2   • venlafaxine XR (EFFEXOR-XR) 75 MG 24 hr capsule Take 1 capsule by mouth Every Night. 30  "capsule 2     No current facility-administered medications for this visit.       Review of Symptoms:    Review of Systems   Constitutional: Negative for activity change and fatigue.   HENT: Negative for congestion and rhinorrhea.    Eyes: Negative for blurred vision and visual disturbance.   Respiratory: Negative for cough and shortness of breath.    Cardiovascular: Negative for chest pain and palpitations.   Gastrointestinal: Negative for nausea and vomiting.   Endocrine: Negative for cold intolerance and heat intolerance.   Genitourinary: Negative for dysuria and frequency.   Musculoskeletal: Negative for arthralgias and myalgias.   Skin: Negative for rash and wound.   Allergic/Immunologic: Negative for environmental allergies and food allergies.   Neurological: Negative for weakness and confusion.   Hematological: Negative for adenopathy. Does not bruise/bleed easily.   Psychiatric/Behavioral: Positive for dysphoric mood, sleep disturbance and stress. Negative for agitation, decreased concentration, suicidal ideas and depressed mood. The patient is nervous/anxious.          Physical Exam:   Blood pressure 112/76, pulse 74, height 170.2 cm (67.01\"), weight 82.6 kg (182 lb), not currently breastfeeding.    Appearance: CF of stated age, NAD   Gait, Station, Strength: WNL    Mental Status Exam:   Hygiene:   good  Cooperation:  Cooperative  Eye Contact:  Good  Psychomotor Behavior:  Appropriate  Affect:  Full range  Mood: normal-intermittent mood and anxiety exacerbations   Hopelessness: Optimistic  Speech:  Normal  Thought Process:  Goal directed and Linear  Thought Content:  Normal and Mood congruent  Suicidal:  None, resolved  Homicidal:  None  Hallucinations:  None  Delusion:  None  Memory:  Intact  Orientation:  Person, Place, Time and Situation  Reliability:  good  Insight:  Fair  Judgement:  Good  Impulse Control:  Good      Lab Results:   Lab on 08/01/2022   Component Date Value Ref Range Status   • TSH " 08/01/2022 6.190 (A) 0.270 - 4.200 uIU/mL Final   • Free T4 08/01/2022 0.79 (A) 0.93 - 1.70 ng/dL Final   • WBC 08/01/2022 6.65  3.40 - 10.80 10*3/mm3 Final   • RBC 08/01/2022 4.93  3.77 - 5.28 10*6/mm3 Final   • Hemoglobin 08/01/2022 14.3  12.0 - 15.9 g/dL Final   • Hematocrit 08/01/2022 43.6  34.0 - 46.6 % Final   • MCV 08/01/2022 88.4  79.0 - 97.0 fL Final   • MCH 08/01/2022 29.0  26.6 - 33.0 pg Final   • MCHC 08/01/2022 32.8  31.5 - 35.7 g/dL Final   • RDW 08/01/2022 14.9  12.3 - 15.4 % Final   • RDW-SD 08/01/2022 48.5  37.0 - 54.0 fl Final   • MPV 08/01/2022 11.0  6.0 - 12.0 fL Final   • Platelets 08/01/2022 220  140 - 450 10*3/mm3 Final   • Neutrophil % 08/01/2022 42.8  42.7 - 76.0 % Final   • Lymphocyte % 08/01/2022 39.4  19.6 - 45.3 % Final   • Monocyte % 08/01/2022 11.6  5.0 - 12.0 % Final   • Eosinophil % 08/01/2022 5.1  0.3 - 6.2 % Final   • Basophil % 08/01/2022 0.8  0.0 - 1.5 % Final   • Immature Grans % 08/01/2022 0.3  0.0 - 0.5 % Final   • Neutrophils, Absolute 08/01/2022 2.85  1.70 - 7.00 10*3/mm3 Final   • Lymphocytes, Absolute 08/01/2022 2.62  0.70 - 3.10 10*3/mm3 Final   • Monocytes, Absolute 08/01/2022 0.77  0.10 - 0.90 10*3/mm3 Final   • Eosinophils, Absolute 08/01/2022 0.34  0.00 - 0.40 10*3/mm3 Final   • Basophils, Absolute 08/01/2022 0.05  0.00 - 0.20 10*3/mm3 Final   • Immature Grans, Absolute 08/01/2022 0.02  0.00 - 0.05 10*3/mm3 Final   • nRBC 08/01/2022 0.0  0.0 - 0.2 /100 WBC Final   Office Visit on 07/18/2022   Component Date Value Ref Range Status   • Color 07/18/2022 Yellow  Yellow, Straw, Dark Yellow, Jessi Final   • Clarity, UA 07/18/2022 Clear  Clear Final   • Specific Gravity  07/18/2022 1.025  1.005 - 1.030 Final   • pH, Urine 07/18/2022 6.0  5.0 - 8.0 Final   • Leukocytes 07/18/2022 Trace (A) Negative Final   • Nitrite, UA 07/18/2022 Negative  Negative Final   • Protein, POC 07/18/2022 1+ (A) Negative mg/dL Final   • Glucose, UA 07/18/2022 Negative  Negative mg/dL Final   •  Ketones, UA 07/18/2022 Negative  Negative Final   • Urobilinogen, UA 07/18/2022 1 E.U./dL  (A) Normal Final   • Bilirubin 07/18/2022 Negative  Negative Final   • Blood, UA 07/18/2022 Negative  Negative Final   • Lot Number 07/18/2022 [id number]   Final   • Expiration Date 07/18/2022 12-   Final   • Urine Culture 07/18/2022 50,000 CFU/mL Escherichia coli (A)  Final       Assessment & Plan   Diagnoses and all orders for this visit:    1. Chronic post-traumatic stress disorder (PTSD) (Primary)  -     LORazepam (Ativan) 0.5 MG tablet; Take 1 tablet by mouth Daily As Needed for Anxiety.  Dispense: 28 tablet; Refill: 0  -     ARIPiprazole (ABILIFY) 5 MG tablet; Take 1 tablet by mouth Every Night.  Dispense: 30 tablet; Refill: 2  -     venlafaxine XR (EFFEXOR-XR) 150 MG 24 hr capsule; Take 1 capsule by mouth Daily.  Dispense: 30 capsule; Refill: 2    2. AMARIS (generalized anxiety disorder)  -     LORazepam (Ativan) 0.5 MG tablet; Take 1 tablet by mouth Daily As Needed for Anxiety.  Dispense: 28 tablet; Refill: 0  -     ARIPiprazole (ABILIFY) 5 MG tablet; Take 1 tablet by mouth Every Night.  Dispense: 30 tablet; Refill: 2  -     propranolol (INDERAL) 20 MG tablet; Take 0.5 to 1 tablet PO up to twice daily as needed for anxiety.  Dispense: 60 tablet; Refill: 1  -     venlafaxine XR (EFFEXOR-XR) 150 MG 24 hr capsule; Take 1 capsule by mouth Daily.  Dispense: 30 capsule; Refill: 2    3. Social anxiety disorder  -     LORazepam (Ativan) 0.5 MG tablet; Take 1 tablet by mouth Daily As Needed for Anxiety.  Dispense: 28 tablet; Refill: 0  -     propranolol (INDERAL) 20 MG tablet; Take 0.5 to 1 tablet PO up to twice daily as needed for anxiety.  Dispense: 60 tablet; Refill: 1  -     venlafaxine XR (EFFEXOR-XR) 150 MG 24 hr capsule; Take 1 capsule by mouth Daily.  Dispense: 30 capsule; Refill: 2    4. Nightmares associated with chronic post-traumatic stress disorder  -     prazosin (MINIPRESS) 2 MG capsule; Take 1 capsule by  mouth Every Night.  Dispense: 30 capsule; Refill: 1    5. Nicotine use disorder    6. Cannabis abuse, episodic    -Patient overall coping well but is having some intermittent exacerbations of mood and anxiety symptoms with some difficult days and some worsening nightmares.  Prazosin was initially helpful but her nightmares have recurred and are causing significant difficulty with sleep.  -Reviewed previous available documentation  -Reviewed most recent available labs   -NALINI reviewed and appropriate. Patient counseled on use of controlled substances.   -Continue Abilify 5 mg p.o. nightly for mood stabilization and anxiety  -Increase Effexor XR 75 mg to 150 mg p.o. daily for mood and anxiety  -Continue propranolol 10 to 20 mg p.o. as needed 30 minutes to an hour prior to anxiety provoking event for situational and social anxiety  -Increase prazosin 1 mg to 2 mg p.o. nightly for nightmares related to PTSD  -Continue Ativan 0.5 mg p.o. daily as needed for anxiety.  Advised patient to only take the medication as needed in times of crisis  -Encourage cessation of nicotine  -Encourage cessation of cannabis  -Encouraged continued cessation of methamphetamine  -Patient interested in therapy and will refer for therapist today      Visit Diagnoses:    ICD-10-CM ICD-9-CM   1. Chronic post-traumatic stress disorder (PTSD)  F43.12 309.81   2. AMARIS (generalized anxiety disorder)  F41.1 300.02   3. Social anxiety disorder  F40.10 300.23   4. Nightmares associated with chronic post-traumatic stress disorder  F51.5 307.47    F43.12 309.81   5. Nicotine use disorder  F17.200 305.1   6. Cannabis abuse, episodic  F12.10 305.22       TREATMENT PLAN/GOALS: Continue supportive psychotherapy efforts and medications as indicated. Treatment and medication options discussed during today's visit. Patient acknowledged and verbally consented to continue with current treatment plan and was educated on the importance of compliance with treatment  and follow-up appointments.    MEDICATION ISSUES:    Discussed medication options and treatment plan of prescribed medication as well as the risks, benefits, and side effects including potential falls, possible impaired driving and metabolic adversities among others. Patient is agreeable to call the office with any worsening of symptoms or onset of side effects. Patient is agreeable to call 911 or go to the nearest ER should he/she begin having SI/HI.     MEDS ORDERED DURING VISIT:  New Medications Ordered This Visit   Medications   • prazosin (MINIPRESS) 2 MG capsule     Sig: Take 1 capsule by mouth Every Night.     Dispense:  30 capsule     Refill:  1   • LORazepam (Ativan) 0.5 MG tablet     Sig: Take 1 tablet by mouth Daily As Needed for Anxiety.     Dispense:  28 tablet     Refill:  0   • ARIPiprazole (ABILIFY) 5 MG tablet     Sig: Take 1 tablet by mouth Every Night.     Dispense:  30 tablet     Refill:  2   • propranolol (INDERAL) 20 MG tablet     Sig: Take 0.5 to 1 tablet PO up to twice daily as needed for anxiety.     Dispense:  60 tablet     Refill:  1   • venlafaxine XR (EFFEXOR-XR) 150 MG 24 hr capsule     Sig: Take 1 capsule by mouth Daily.     Dispense:  30 capsule     Refill:  2       Return in about 3 months (around 11/8/2022).             This document has been electronically signed by Giacomo Phipps MD  August 8, 2022 11:41 EDT    Dictated using Dragon Dictation.

## 2022-08-08 NOTE — TELEPHONE ENCOUNTER
Notified pt to contact GI office as it was indicated a letter was mailed to her from their office requesting her to call for an appointment.  She also called in regards to her most recent labs.  Please advise.

## 2022-08-09 NOTE — TELEPHONE ENCOUNTER
Has she been taking her synthroid 50 mcg? The labs are indicating that she needs an increase in her medication. If she is okay with it, please send synthroid 75 mcg orally daily, #90 with no refills to her pharmacy. Thanks.

## 2022-08-16 NOTE — TELEPHONE ENCOUNTER
Contacted patient, she says that she has not been been taking her medication. Do you want her back on the 50 mcg?

## 2022-08-16 NOTE — TELEPHONE ENCOUNTER
Yes. Restart at 50 mcg daily then. If she needs it sent, can sent to pharmacy - synthroid 50 mcg orally daily, #90, with 1 refill.

## 2022-08-22 ENCOUNTER — OFFICE VISIT (OUTPATIENT)
Dept: GASTROENTEROLOGY | Facility: CLINIC | Age: 47
End: 2022-08-22

## 2022-08-22 VITALS — BODY MASS INDEX: 27.97 KG/M2 | HEIGHT: 67 IN | WEIGHT: 178.2 LBS

## 2022-08-22 DIAGNOSIS — Z12.11 ENCOUNTER FOR SCREENING FOR MALIGNANT NEOPLASM OF COLON: ICD-10-CM

## 2022-08-22 DIAGNOSIS — K59.04 CHRONIC IDIOPATHIC CONSTIPATION: Primary | ICD-10-CM

## 2022-08-22 PROCEDURE — 99214 OFFICE O/P EST MOD 30 MIN: CPT | Performed by: PHYSICIAN ASSISTANT

## 2022-08-22 RX ORDER — POLYETHYLENE GLYCOL 3350 17 G/17G
510 POWDER, FOR SOLUTION ORAL ONCE
Qty: 510 G | Refills: 0 | Status: SHIPPED | OUTPATIENT
Start: 2022-08-22 | End: 2022-08-22

## 2022-08-22 RX ORDER — BISACODYL 5 MG/1
20 TABLET, DELAYED RELEASE ORAL ONCE
Qty: 4 TABLET | Refills: 0 | Status: SHIPPED | OUTPATIENT
Start: 2022-08-22 | End: 2022-08-22

## 2022-08-22 RX ORDER — PLECANATIDE 3 MG/1
3 TABLET ORAL DAILY
Qty: 30 TABLET | Refills: 11 | Status: SHIPPED | OUTPATIENT
Start: 2022-08-22 | End: 2022-10-31

## 2022-08-22 NOTE — PROGRESS NOTES
Chief Complaint   Patient presents with   • Abdominal Pain       Meghan Rivas is a 46 y.o. female who presents to the office today for evaluation of Abdominal Pain  .    HPI  Patient presents the clinic today for evaluation of abdominal pain, constipation and fecal incontinence.  She was referred to us today by Dr. Mccarty who is her PCP.  Patient is currently taking Linzess 145 mcg once daily for constipation which is making no significant difference.  She states that she has been suffering from fecal incontinence that she is unable to help for roughly 6 months however within the last 2 months things have significantly worsened.  Patient states without medication she typically goes several days in between bowel movements and when she does go it does not feel like her colon empties out well.  She experiences pressure within the rectum.  Patient has never underwent a colonoscopy in the past-she is adopted therefore has unknown family history.  Review of Systems   Constitutional: Negative.    HENT: Negative for trouble swallowing.    Eyes: Negative.    Respiratory: Negative for chest tightness.    Cardiovascular: Negative for chest pain.   Gastrointestinal: Positive for abdominal distention, abdominal pain, anal bleeding and blood in stool. Negative for constipation, diarrhea, nausea and vomiting.   Endocrine: Negative.    Genitourinary: Positive for dysuria.   Musculoskeletal: Negative.    Skin: Negative.    Allergic/Immunologic: Negative.    Neurological: Negative.    Hematological: Negative.    Psychiatric/Behavioral: Negative.        ACTIVE PROBLEMS:   Specialty Problems        Gastroenterology Problems    Chronic hepatitis C without hepatic coma (HCC)        Gastroesophageal reflux disease        Acute appendicitis              PAST MEDICAL HISTORY:  Past Medical History:   Diagnosis Date   • Anxiety    • Depression    • Disease of thyroid gland    • GERD (gastroesophageal reflux disease)    • Hepatitis C    •  History of drug abuse (HCC)    • Seizures (HCC)        SURGICAL HISTORY:  Past Surgical History:   Procedure Laterality Date   • APPENDECTOMY N/A 3/8/2022    Procedure: APPENDECTOMY LAPAROSCOPIC;  Surgeon: Babar Perkins MD;  Location: [facility];  Service: General;  Laterality: N/A;   • OVARIAN CYST SURGERY      x7 surgeries       FAMILY HISTORY:  Family History   Adopted: Yes       SOCIAL HISTORY:  Social History     Tobacco Use   • Smoking status: Current Every Day Smoker     Packs/day: 0.25     Years: 3.50     Pack years: 0.87     Types: Cigarettes   • Smokeless tobacco: Never Used   Substance Use Topics   • Alcohol use: Not Currently     Comment: sober 4 years       CURRENT MEDICATION:    Current Outpatient Medications:   •  albuterol sulfate  (90 Base) MCG/ACT inhaler, Inhale 2 puffs Every 6 (Six) Hours As Needed for Wheezing., Disp: 6.7 g, Rfl: 2  •  ARIPiprazole (ABILIFY) 5 MG tablet, Take 1 tablet by mouth Every Night., Disp: 30 tablet, Rfl: 2  •  ciprofloxacin (Cipro) 500 MG tablet, Take 1 tablet by mouth 2 (Two) Times a Day., Disp: 14 tablet, Rfl: 0  •  docusate sodium (Colace) 100 MG capsule, Take 1 capsule by mouth 2 (Two) Times a Day., Disp: 60 capsule, Rfl: 2  •  ibuprofen (ADVIL,MOTRIN) 600 MG tablet, Take 1 tablet by mouth Every 6 (Six) Hours As Needed for Mild Pain ., Disp: 20 tablet, Rfl: 0  •  levothyroxine (Synthroid) 50 MCG tablet, Take 1 tablet by mouth Every Morning., Disp: 60 tablet, Rfl: 2  •  LORazepam (Ativan) 0.5 MG tablet, Take 1 tablet by mouth Daily As Needed for Anxiety., Disp: 28 tablet, Rfl: 0  •  omeprazole (PrilOSEC) 40 MG capsule, Take 1 capsule by mouth Daily., Disp: 30 capsule, Rfl: 2  •  polyethylene glycol (MIRALAX) 17 g packet, Take 17 g by mouth 2 (Two) Times a Day., Disp: 100 each, Rfl: 2  •  prazosin (MINIPRESS) 2 MG capsule, Take 1 capsule by mouth Every Night., Disp: 30 capsule, Rfl: 1  •  propranolol (INDERAL) 20 MG tablet, Take 0.5 to 1 tablet PO up to  "twice daily as needed for anxiety., Disp: 60 tablet, Rfl: 1  •  venlafaxine XR (EFFEXOR-XR) 150 MG 24 hr capsule, Take 1 capsule by mouth Daily., Disp: 30 capsule, Rfl: 2  •  Plecanatide (Trulance) 3 MG tablet, Take 1 tablet by mouth Daily., Disp: 30 tablet, Rfl: 11    ALLERGIES:  Contrast dye    VISIT VITALS:  Ht 170.2 cm (67\")   Wt 80.8 kg (178 lb 3.2 oz)   BMI 27.91 kg/m²   Physical Exam  Constitutional:       General: She is not in acute distress.     Appearance: Normal appearance. She is well-developed.   HENT:      Head: Normocephalic and atraumatic.   Eyes:      Pupils: Pupils are equal, round, and reactive to light.   Cardiovascular:      Rate and Rhythm: Normal rate and regular rhythm.      Heart sounds: Normal heart sounds.   Pulmonary:      Effort: Pulmonary effort is normal. No respiratory distress.      Breath sounds: Normal breath sounds. No wheezing, rhonchi or rales.   Abdominal:      General: Abdomen is flat. Bowel sounds are normal. There is no distension.      Palpations: Abdomen is soft. There is no mass.      Tenderness: There is no abdominal tenderness. There is no guarding or rebound.      Hernia: No hernia is present.   Musculoskeletal:         General: No swelling. Normal range of motion.      Cervical back: Normal range of motion and neck supple.      Right lower leg: No edema.      Left lower leg: No edema.   Skin:     General: Skin is warm and dry.   Neurological:      Mental Status: She is alert and oriented to person, place, and time.   Psychiatric:         Attention and Perception: Attention normal.         Mood and Affect: Mood normal.         Speech: Speech normal.         Behavior: Behavior normal. Behavior is cooperative.         Thought Content: Thought content normal.         Assessment    Due to patient's symptoms-I will go ahead and discontinue her Linzess and get her started on Trulance 3 mg once daily for chronic constipation.  She will also be scheduled a screening " colonoscopy to be performed by Dr. Chappell.  Procedure was thoroughly explained to the patient and she voiced understanding and agreement to the treatment plan.   Diagnosis Plan   1. Chronic idiopathic constipation  Case Request    Case Request    Plecanatide (Trulance) 3 MG tablet    polyethylene glycol (MIRALAX) 17 GM/SCOOP powder    bisacodyl (DULCOLAX) 5 MG EC tablet   2. Encounter for screening for malignant neoplasm of colon  polyethylene glycol (MIRALAX) 17 GM/SCOOP powder    bisacodyl (DULCOLAX) 5 MG EC tablet       Return After procedure.               This document has been electronically signed by Cristiano Baez PA-C  August 23, 2022 15:31 EDT    Part of this note may be an electronic transcription/translation of spoken language to printed text using the Dragon Dictation System.

## 2022-08-26 ENCOUNTER — PATIENT ROUNDING (BHMG ONLY) (OUTPATIENT)
Dept: GASTROENTEROLOGY | Facility: CLINIC | Age: 47
End: 2022-08-26

## 2022-08-26 NOTE — PROGRESS NOTES
August 26, 2022    Hello, may I speak with Meghan Rivas?    My name is Micki Arrieta       I am  with [facility]  [address] [place] [address] [phone].    Before we get started may I verify your date of birth? 1975    I am calling to officially welcome you to our practice and ask about your recent visit. Is this a good time to talk? yes    Tell me about your visit with us. What things went well?  My visit went well. Provider has good bedside manner.        We're always looking for ways to make our patients' experiences even better. Do you have recommendations on ways we may improve?  no    Overall were you satisfied with your first visit to our practice? yes       I appreciate you taking the time to speak with me today. Is there anything else I can do for you? no      Thank you, and have a great day.

## 2022-09-01 DIAGNOSIS — K59.04 CHRONIC IDIOPATHIC CONSTIPATION: ICD-10-CM

## 2022-09-01 RX ORDER — PLECANATIDE 3 MG/1
3 TABLET ORAL DAILY
Qty: 30 TABLET | Refills: 11 | OUTPATIENT
Start: 2022-09-01

## 2022-09-16 DIAGNOSIS — F40.10 SOCIAL ANXIETY DISORDER: ICD-10-CM

## 2022-09-16 DIAGNOSIS — F43.12 CHRONIC POST-TRAUMATIC STRESS DISORDER (PTSD): ICD-10-CM

## 2022-09-16 DIAGNOSIS — F41.1 GAD (GENERALIZED ANXIETY DISORDER): ICD-10-CM

## 2022-09-16 RX ORDER — ARIPIPRAZOLE 5 MG/1
5 TABLET ORAL NIGHTLY
Qty: 30 TABLET | Refills: 2 | Status: SHIPPED | OUTPATIENT
Start: 2022-09-16 | End: 2022-12-13 | Stop reason: SDUPTHER

## 2022-09-16 RX ORDER — VENLAFAXINE HYDROCHLORIDE 150 MG/1
150 CAPSULE, EXTENDED RELEASE ORAL DAILY
Qty: 30 CAPSULE | Refills: 2 | Status: SHIPPED | OUTPATIENT
Start: 2022-09-16 | End: 2022-12-13 | Stop reason: SDUPTHER

## 2022-09-28 PROBLEM — K59.04 CHRONIC IDIOPATHIC CONSTIPATION: Status: ACTIVE | Noted: 2022-09-28

## 2022-10-05 ENCOUNTER — ANESTHESIA EVENT (OUTPATIENT)
Dept: PERIOP | Facility: HOSPITAL | Age: 47
End: 2022-10-05

## 2022-10-05 ENCOUNTER — ANESTHESIA (OUTPATIENT)
Dept: PERIOP | Facility: HOSPITAL | Age: 47
End: 2022-10-05

## 2022-10-05 ENCOUNTER — HOSPITAL ENCOUNTER (OUTPATIENT)
Facility: HOSPITAL | Age: 47
Setting detail: HOSPITAL OUTPATIENT SURGERY
Discharge: HOME | End: 2022-10-05
Attending: INTERNAL MEDICINE | Admitting: INTERNAL MEDICINE

## 2022-10-05 VITALS
BODY MASS INDEX: 27.15 KG/M2 | WEIGHT: 173 LBS | SYSTOLIC BLOOD PRESSURE: 105 MMHG | DIASTOLIC BLOOD PRESSURE: 86 MMHG | RESPIRATION RATE: 18 BRPM | OXYGEN SATURATION: 97 % | HEART RATE: 71 BPM | HEIGHT: 67 IN | TEMPERATURE: 98.1 F

## 2022-10-05 DIAGNOSIS — K59.04 CHRONIC IDIOPATHIC CONSTIPATION: ICD-10-CM

## 2022-10-05 LAB
B-HCG UR QL: NEGATIVE
EXPIRATION DATE: NORMAL
INTERNAL NEGATIVE CONTROL: NEGATIVE
INTERNAL POSITIVE CONTROL: POSITIVE
Lab: NORMAL

## 2022-10-05 PROCEDURE — 45378 DIAGNOSTIC COLONOSCOPY: CPT | Performed by: INTERNAL MEDICINE

## 2022-10-05 PROCEDURE — 25010000002 PROPOFOL 200 MG/20ML EMULSION: Performed by: NURSE ANESTHETIST, CERTIFIED REGISTERED

## 2022-10-05 PROCEDURE — 25010000002 MIDAZOLAM PER 1 MG: Performed by: ANESTHESIOLOGY

## 2022-10-05 PROCEDURE — 25010000002 MIDAZOLAM PER 1MG: Performed by: NURSE ANESTHETIST, CERTIFIED REGISTERED

## 2022-10-05 PROCEDURE — 81025 URINE PREGNANCY TEST: CPT | Performed by: ANESTHESIOLOGY

## 2022-10-05 RX ORDER — SODIUM CHLORIDE 0.9 % (FLUSH) 0.9 %
10 SYRINGE (ML) INJECTION EVERY 12 HOURS SCHEDULED
Status: DISCONTINUED | OUTPATIENT
Start: 2022-10-05 | End: 2022-10-05 | Stop reason: HOSPADM

## 2022-10-05 RX ORDER — IPRATROPIUM BROMIDE AND ALBUTEROL SULFATE 2.5; .5 MG/3ML; MG/3ML
3 SOLUTION RESPIRATORY (INHALATION) ONCE AS NEEDED
Status: DISCONTINUED | OUTPATIENT
Start: 2022-10-05 | End: 2022-10-05 | Stop reason: HOSPADM

## 2022-10-05 RX ORDER — KETOROLAC TROMETHAMINE 30 MG/ML
30 INJECTION, SOLUTION INTRAMUSCULAR; INTRAVENOUS EVERY 6 HOURS PRN
Status: DISCONTINUED | OUTPATIENT
Start: 2022-10-05 | End: 2022-10-05 | Stop reason: HOSPADM

## 2022-10-05 RX ORDER — SODIUM CHLORIDE, SODIUM LACTATE, POTASSIUM CHLORIDE, CALCIUM CHLORIDE 600; 310; 30; 20 MG/100ML; MG/100ML; MG/100ML; MG/100ML
100 INJECTION, SOLUTION INTRAVENOUS ONCE AS NEEDED
Status: DISCONTINUED | OUTPATIENT
Start: 2022-10-05 | End: 2022-10-05 | Stop reason: HOSPADM

## 2022-10-05 RX ORDER — SODIUM CHLORIDE, SODIUM LACTATE, POTASSIUM CHLORIDE, CALCIUM CHLORIDE 600; 310; 30; 20 MG/100ML; MG/100ML; MG/100ML; MG/100ML
125 INJECTION, SOLUTION INTRAVENOUS ONCE
Status: COMPLETED | OUTPATIENT
Start: 2022-10-05 | End: 2022-10-05

## 2022-10-05 RX ORDER — ONDANSETRON 2 MG/ML
4 INJECTION INTRAMUSCULAR; INTRAVENOUS AS NEEDED
Status: DISCONTINUED | OUTPATIENT
Start: 2022-10-05 | End: 2022-10-05 | Stop reason: HOSPADM

## 2022-10-05 RX ORDER — FENTANYL CITRATE 50 UG/ML
50 INJECTION, SOLUTION INTRAMUSCULAR; INTRAVENOUS
Status: DISCONTINUED | OUTPATIENT
Start: 2022-10-05 | End: 2022-10-05 | Stop reason: HOSPADM

## 2022-10-05 RX ORDER — SODIUM CHLORIDE 0.9 % (FLUSH) 0.9 %
10 SYRINGE (ML) INJECTION AS NEEDED
Status: DISCONTINUED | OUTPATIENT
Start: 2022-10-05 | End: 2022-10-05 | Stop reason: HOSPADM

## 2022-10-05 RX ORDER — OXYCODONE HYDROCHLORIDE AND ACETAMINOPHEN 5; 325 MG/1; MG/1
1 TABLET ORAL ONCE AS NEEDED
Status: DISCONTINUED | OUTPATIENT
Start: 2022-10-05 | End: 2022-10-05 | Stop reason: HOSPADM

## 2022-10-05 RX ORDER — PROPOFOL 10 MG/ML
INJECTION, EMULSION INTRAVENOUS AS NEEDED
Status: DISCONTINUED | OUTPATIENT
Start: 2022-10-05 | End: 2022-10-05 | Stop reason: SURG

## 2022-10-05 RX ORDER — MIDAZOLAM HYDROCHLORIDE 2 MG/2ML
INJECTION, SOLUTION INTRAMUSCULAR; INTRAVENOUS AS NEEDED
Status: DISCONTINUED | OUTPATIENT
Start: 2022-10-05 | End: 2022-10-05 | Stop reason: SURG

## 2022-10-05 RX ORDER — LIDOCAINE HYDROCHLORIDE 20 MG/ML
INJECTION, SOLUTION EPIDURAL; INFILTRATION; INTRACAUDAL; PERINEURAL AS NEEDED
Status: DISCONTINUED | OUTPATIENT
Start: 2022-10-05 | End: 2022-10-05 | Stop reason: SURG

## 2022-10-05 RX ORDER — MIDAZOLAM HYDROCHLORIDE 1 MG/ML
1 INJECTION INTRAMUSCULAR; INTRAVENOUS
Status: COMPLETED | OUTPATIENT
Start: 2022-10-05 | End: 2022-10-05

## 2022-10-05 RX ORDER — MEPERIDINE HYDROCHLORIDE 25 MG/ML
12.5 INJECTION INTRAMUSCULAR; INTRAVENOUS; SUBCUTANEOUS
Status: DISCONTINUED | OUTPATIENT
Start: 2022-10-05 | End: 2022-10-05 | Stop reason: HOSPADM

## 2022-10-05 RX ADMIN — MIDAZOLAM HYDROCHLORIDE 1 MG: 1 INJECTION, SOLUTION INTRAMUSCULAR; INTRAVENOUS at 11:07

## 2022-10-05 RX ADMIN — PROPOFOL 50 MG: 10 INJECTION, EMULSION INTRAVENOUS at 12:17

## 2022-10-05 RX ADMIN — MIDAZOLAM HYDROCHLORIDE 1 MG: 1 INJECTION, SOLUTION INTRAMUSCULAR; INTRAVENOUS at 10:57

## 2022-10-05 RX ADMIN — MIDAZOLAM HYDROCHLORIDE 2 MG: 1 INJECTION, SOLUTION INTRAMUSCULAR; INTRAVENOUS at 12:04

## 2022-10-05 RX ADMIN — PROPOFOL 50 MG: 10 INJECTION, EMULSION INTRAVENOUS at 12:11

## 2022-10-05 RX ADMIN — LIDOCAINE HYDROCHLORIDE 60 MG: 20 INJECTION, SOLUTION EPIDURAL; INFILTRATION; INTRACAUDAL; PERINEURAL at 12:05

## 2022-10-05 RX ADMIN — PROPOFOL 50 MG: 10 INJECTION, EMULSION INTRAVENOUS at 12:23

## 2022-10-05 RX ADMIN — PROPOFOL 100 MG: 10 INJECTION, EMULSION INTRAVENOUS at 12:05

## 2022-10-05 RX ADMIN — SODIUM CHLORIDE, POTASSIUM CHLORIDE, SODIUM LACTATE AND CALCIUM CHLORIDE: 600; 310; 30; 20 INJECTION, SOLUTION INTRAVENOUS at 12:04

## 2022-10-05 NOTE — H&P
[facility]    Patient Identification:  Name:  Meghan Rivas  Age:  46 y.o.  Sex:  female  :  1975  MRN:  [id number]   Visit Number:  [id number]  Primary Care Physician:  Salome Li MD       Chief complaint: Fecal incontinence, bright red blood per rectum and constipation  History of presenting illness:  46 y.o. female presents for colonoscopy today secondary to fecal incontinence, constipation and abdominal pain.  The patient has been on Linzess for constipation did not help her constipation.  She has been experiencing fecal incontinence for about 6 months or more.  She states that her symptoms have worsened recently.  She complains of rectal pressure.  She does not know her family history as she is adopted.  She has lost about 19 pounds since March of this year intentionally.  She has been dieting.  She also complains of bright red blood per rectum with bowel movements.  Trulance has helped the constipation but has not helped her fecal incontinence.       Review of Systems   HENT: Negative for trouble swallowing.    Eyes: Negative.    Respiratory: Negative for chest tightness.    Cardiovascular: Negative for chest pain.   Gastrointestinal: Positive for abdominal distention, abdominal pain, anal bleeding and blood in stool. Negative for constipation, diarrhea, nausea and vomiting.   Endocrine: Negative.    Genitourinary: Positive for dysuria.   Musculoskeletal: Negative.    Skin: Negative.    Allergic/Immunologic: Negative.    Neurological: Negative.    Hematological: Negative.    Psychiatric/Behavioral: Negative.    ---------------------------------------------------------------------------------------------------------------------   Past Medical History:   Diagnosis Date   • Anxiety    • Depression    • Disease of thyroid gland    • GERD (gastroesophageal reflux disease)    • Hepatitis C    • History of drug abuse (HCC)    • Hypertension    •  Seizures (HCC)     LAST SEIZURE 2019     Past Surgical History:   Procedure Laterality Date   • APPENDECTOMY N/A 3/8/2022    Procedure: APPENDECTOMY LAPAROSCOPIC;  Surgeon: Babar Perkins MD;  Location: [facility];  Service: General;  Laterality: N/A;   • OVARIAN CYST SURGERY      x7 surgeries     Family History   Adopted: Yes     Social History     Socioeconomic History   • Marital status:    Tobacco Use   • Smoking status: Current Every Day Smoker     Packs/day: 0.25     Years: 3.50     Pack years: 0.87     Types: Cigarettes   • Smokeless tobacco: Never Used   Vaping Use   • Vaping Use: Former   Substance and Sexual Activity   • Alcohol use: Not Currently     Comment: sober 4 years   • Drug use: Not Currently     Types: IV, Heroin, Methamphetamines     Comment: clean 5 years   • Sexual activity: Yes     Partners: Male     ---------------------------------------------------------------------------------------------------------------------   Allergies:  Contrast dye  ---------------------------------------------------------------------------------------------------------------------   Prior to Admission Medications     Prescriptions Last Dose Informant Patient Reported? Taking?    albuterol sulfate  (90 Base) MCG/ACT inhaler Past Week  No Yes    Inhale 2 puffs Every 6 (Six) Hours As Needed for Wheezing.    docusate sodium (Colace) 100 MG capsule 10/5/2022  No Yes    Take 1 capsule by mouth 2 (Two) Times a Day.    ibuprofen (ADVIL,MOTRIN) 600 MG tablet Past Week  No Yes    Take 1 tablet by mouth Every 6 (Six) Hours As Needed for Mild Pain .    levothyroxine (Synthroid) 50 MCG tablet 10/5/2022  No Yes    Take 1 tablet by mouth Every Morning.    LORazepam (Ativan) 0.5 MG tablet Past Week  No Yes    Take 1 tablet by mouth Daily As Needed for Anxiety.    omeprazole (PrilOSEC) 40 MG capsule Past Week  No Yes    Take 1 capsule by mouth Daily.    polyethylene glycol (MIRALAX) 17 g packet 10/4/2022  No Yes     Take 17 g by mouth 2 (Two) Times a Day.    prazosin (MINIPRESS) 2 MG capsule Past Week  No Yes    Take 1 capsule by mouth Every Night.    propranolol (INDERAL) 20 MG tablet 10/4/2022  No Yes    Take 0.5 to 1 tablet PO up to twice daily as needed for anxiety.    ARIPiprazole (ABILIFY) 5 MG tablet Past Week  No Yes    Take 1 tablet by mouth Every Night.    Plecanatide (Trulance) 3 MG tablet 10/4/2022  No Yes    Take 1 tablet by mouth Daily.    venlafaxine XR (EFFEXOR-XR) 150 MG 24 hr capsule 10/4/2022  No Yes    Take 1 capsule by mouth Daily.        Hospital Scheduled Meds:  lactated ringers, 125 mL/hr, Intravenous, Once  sodium chloride, 10 mL, Intravenous, Q12H         ---------------------------------------------------------------------------------------------------------------------   Vital Signs:  Temp:  [97.9 °F (36.6 °C)] 97.9 °F (36.6 °C)  Heart Rate:  [78] 78  Resp:  [20] 20  BP: (126)/(73) 126/73      10/05/22  1015   Weight: 78.5 kg (173 lb)     Body mass index is 27.1 kg/m².  ---------------------------------------------------------------------------------------------------------------------   Physical Exam:  Constitutional:  Well-developed and well-nourished.  No respiratory distress.      HENT:  Head: Normocephalic and atraumatic.  Mouth:  Moist mucous membranes.    Eyes:  Conjunctivae and EOM are normal.  Pupils are equal, round, and reactive to light.  No scleral icterus.  Neck:  Neck supple.  No JVD present.    Cardiovascular:  Normal rate, regular rhythm and normal heart sounds with no murmur.  Pulmonary/Chest:  No respiratory distress, no wheezes, no crackles, with normal breath sounds and good air movement.  Abdominal:  Soft.  Bowel sounds are normal.  No distension and no tenderness.   Musculoskeletal:  No edema, no tenderness, and no deformity.  No red or swollen joints anywhere.    Neurological:  Alert and oriented to person, place, and time.  No cranial nerve deficit.  No tongue deviation.   No facial droop.  No slurred speech.   Skin:  Skin is warm and dry.  No rash noted.  No pallor.   Psychiatric:  Normal mood and affect.  Behavior is normal.  Judgment and thought content normal.   Peripheral vascular:  No edema and strong pulses on all 4 extremities.  Genitourinary:  ---------------------------------------------------------------------------------------------------------------------            Invalid input(s): PROTCrCl cannot be calculated (Patient's most recent lab result is older than the maximum 30 days allowed.).  No results found for: AMMONIA          No results found for: HGBA1C  Lab Results   Component Value Date    TSH 6.190 (H) 08/01/2022    FREET4 0.79 (L) 08/01/2022     Lab Results   Component Value Date    PREGTESTUR Negative 03/07/2022     Pain Management Panel     Pain Management Panel Latest Ref Rng & Units 3/23/2022 3/19/2019    AMPHETAMINES SCREEN, URINE Negative Negative Positive(A)    BARBITURATES SCREEN Negative Negative Negative    BENZODIAZEPINE SCREEN, URINE Negative Negative Negative    BUPRENORPHINEUR Negative Positive(A) Positive(A)    COCAINE SCREEN, URINE Negative Negative Negative    METHADONE SCREEN, URINE Negative Negative Negative    METHAMPHETAMINEUR Negative Negative -        No results found for: BLOODCX  No results found for: URINECX  No results found for: WOUNDCX  No results found for: STOOLCX      ---------------------------------------------------------------------------------------------------------------------  Imaging Results (Last 7 Days)     ** No results found for the last 168 hours. **          I have personally reviewed the radiology images and read the final radiology report.  ---------------------------------------------------------------------------------------------------------------------  Assessment and Plan:  Proceed with colonoscopy secondary to fecal incontinence, bright red blood per rectum and constipation.    Neris Bear,  MD  10/05/22  11:11 EDT

## 2022-10-05 NOTE — ANESTHESIA POSTPROCEDURE EVALUATION
Patient: Meghan Rivas    Procedure Summary     Date: 10/05/22 Room / Location: [facility] OR  /  [facility] OR    Anesthesia Start: 1204 Anesthesia Stop: 1227    Procedure: COLONOSCOPY (N/A ) Diagnosis:       Chronic idiopathic constipation      (Chronic idiopathic constipation [K59.04])    Surgeons: Neris Bear MD Provider: Yovany Thomas MD    Anesthesia Type: general ASA Status: 3          Anesthesia Type: general    Vitals  Vitals Value Taken Time   /86 10/05/22 1258   Temp 98.1 °F (36.7 °C) 10/05/22 1228   Pulse 71 10/05/22 1258   Resp 18 10/05/22 1258   SpO2 97 % 10/05/22 1258           Post Anesthesia Care and Evaluation    Patient location during evaluation: PACU  Patient participation: complete - patient participated  Level of consciousness: awake  Pain score: 0  Pain management: adequate    Airway patency: patent  Anesthetic complications: No anesthetic complications  PONV Status: none  Cardiovascular status: acceptable  Respiratory status: acceptable  Hydration status: acceptable

## 2022-10-05 NOTE — ANESTHESIA PREPROCEDURE EVALUATION
Anesthesia Evaluation     Patient summary reviewed and Nursing notes reviewed   no history of anesthetic complications:  NPO Solid Status: > 8 hours  NPO Liquid Status: > 8 hours           Airway   Mallampati: II  TM distance: >3 FB  Neck ROM: full  Dental - normal exam         Pulmonary     breath sounds clear to auscultation  (+) a smoker Current,   Cardiovascular   Exercise tolerance: good (4-7 METS)    ECG reviewed  Rhythm: regular  Rate: normal    (+) hypertension,   (-) valvular problems/murmurs      Neuro/Psych  (+) psychiatric history,    (-) seizures, syncope  GI/Hepatic/Renal/Endo    (+)  GERD,  hepatitis C, liver disease, thyroid problem     Musculoskeletal     (+) radiculopathy  Abdominal     Abdomen: soft and tender.   Substance History   (+) drug use (IVDU history, sober 4+ years)     OB/GYN negative ob/gyn ROS         Other - negative ROS                       Anesthesia Plan    ASA 3     general     intravenous induction     Anesthetic plan, risks, benefits, and alternatives have been provided, discussed and informed consent has been obtained with: patient.    Use of blood products discussed with patient  Consented to blood products.       CODE STATUS:      FULL    Lab Results   Component Value Date    WBC 6.65 08/01/2022    HGB 14.3 08/01/2022    HCT 43.6 08/01/2022    MCV 88.4 08/01/2022     08/01/2022       Lab Results   Component Value Date    GLUCOSE 114 (H) 03/23/2022    BUN 11 03/23/2022    CREATININE 0.71 03/23/2022    EGFRIFNONA 100 02/15/2022    BCR 15.5 03/23/2022    K 4.0 03/23/2022    CO2 22.6 03/23/2022    CALCIUM 9.7 03/23/2022    ALBUMIN 4.50 03/23/2022    AST 29 03/23/2022    ALT 10 03/23/2022

## 2022-10-24 ENCOUNTER — OFFICE VISIT (OUTPATIENT)
Dept: PSYCHIATRY | Facility: CLINIC | Age: 47
End: 2022-10-24

## 2022-10-24 ENCOUNTER — OFFICE VISIT (OUTPATIENT)
Dept: FAMILY MEDICINE CLINIC | Facility: CLINIC | Age: 47
End: 2022-10-24

## 2022-10-24 VITALS
SYSTOLIC BLOOD PRESSURE: 120 MMHG | HEART RATE: 68 BPM | DIASTOLIC BLOOD PRESSURE: 60 MMHG | BODY MASS INDEX: 27.59 KG/M2 | HEIGHT: 67 IN | WEIGHT: 175.8 LBS | OXYGEN SATURATION: 96 % | TEMPERATURE: 97.5 F

## 2022-10-24 DIAGNOSIS — K59.2 CONSTIPATION DUE TO NEUROGENIC BOWEL: Primary | ICD-10-CM

## 2022-10-24 DIAGNOSIS — R91.8 MASS OF LUNG: ICD-10-CM

## 2022-10-24 DIAGNOSIS — E03.9 ACQUIRED HYPOTHYROIDISM: ICD-10-CM

## 2022-10-24 DIAGNOSIS — Z91.52 HISTORY OF NON-SUICIDAL SELF-HARM: ICD-10-CM

## 2022-10-24 DIAGNOSIS — F40.10 SOCIAL ANXIETY DISORDER: ICD-10-CM

## 2022-10-24 DIAGNOSIS — F43.12 NIGHTMARES ASSOCIATED WITH CHRONIC POST-TRAUMATIC STRESS DISORDER: ICD-10-CM

## 2022-10-24 DIAGNOSIS — F19.11 HISTORY OF SUBSTANCE ABUSE: ICD-10-CM

## 2022-10-24 DIAGNOSIS — F41.1 GAD (GENERALIZED ANXIETY DISORDER): ICD-10-CM

## 2022-10-24 DIAGNOSIS — F43.12 CHRONIC POST-TRAUMATIC STRESS DISORDER (PTSD): Primary | ICD-10-CM

## 2022-10-24 DIAGNOSIS — K59.00 CONSTIPATION DUE TO NEUROGENIC BOWEL: Primary | ICD-10-CM

## 2022-10-24 DIAGNOSIS — F51.5 NIGHTMARES ASSOCIATED WITH CHRONIC POST-TRAUMATIC STRESS DISORDER: ICD-10-CM

## 2022-10-24 PROCEDURE — 80053 COMPREHEN METABOLIC PANEL: CPT | Performed by: FAMILY MEDICINE

## 2022-10-24 PROCEDURE — 90791 PSYCH DIAGNOSTIC EVALUATION: CPT | Performed by: COUNSELOR

## 2022-10-24 PROCEDURE — 99214 OFFICE O/P EST MOD 30 MIN: CPT | Performed by: FAMILY MEDICINE

## 2022-10-24 PROCEDURE — 84443 ASSAY THYROID STIM HORMONE: CPT | Performed by: FAMILY MEDICINE

## 2022-10-24 PROCEDURE — 84439 ASSAY OF FREE THYROXINE: CPT | Performed by: FAMILY MEDICINE

## 2022-10-24 RX ORDER — DOCUSATE SODIUM 100 MG/1
100 CAPSULE, LIQUID FILLED ORAL 2 TIMES DAILY
Qty: 60 CAPSULE | Refills: 2 | Status: SHIPPED | OUTPATIENT
Start: 2022-10-24
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
## 2022-10-25 LAB
ALBUMIN SERPL-MCNC: 4.1 G/DL (ref 3.5–5.2)
ALBUMIN/GLOB SERPL: 1.3 G/DL
ALP SERPL-CCNC: 52 U/L (ref 39–117)
ALT SERPL W P-5'-P-CCNC: 6 U/L (ref 1–33)
ANION GAP SERPL CALCULATED.3IONS-SCNC: 7 MMOL/L (ref 5–15)
AST SERPL-CCNC: 26 U/L (ref 1–32)
BILIRUB SERPL-MCNC: <0.2 MG/DL (ref 0–1.2)
BUN SERPL-MCNC: 12 MG/DL (ref 6–20)
BUN/CREAT SERPL: 22.6 (ref 7–25)
CALCIUM SPEC-SCNC: 9.5 MG/DL (ref 8.6–10.5)
CHLORIDE SERPL-SCNC: 106 MMOL/L (ref 98–107)
CO2 SERPL-SCNC: 28 MMOL/L (ref 22–29)
CREAT SERPL-MCNC: 0.53 MG/DL (ref 0.57–1)
EGFRCR SERPLBLD CKD-EPI 2021: 115.7 ML/MIN/1.73
GLOBULIN UR ELPH-MCNC: 3.1 GM/DL
GLUCOSE SERPL-MCNC: 85 MG/DL (ref 65–99)
POTASSIUM SERPL-SCNC: 4.3 MMOL/L (ref 3.5–5.2)
PROT SERPL-MCNC: 7.2 G/DL (ref 6–8.5)
SODIUM SERPL-SCNC: 141 MMOL/L (ref 136–145)
T4 FREE SERPL-MCNC: 1.05 NG/DL (ref 0.93–1.7)
TSH SERPL DL<=0.05 MIU/L-ACNC: 3.34 UIU/ML (ref 0.27–4.2)

## 2022-10-31 ENCOUNTER — OFFICE VISIT (OUTPATIENT)
Dept: PSYCHIATRY | Facility: CLINIC | Age: 47
End: 2022-10-31

## 2022-10-31 ENCOUNTER — OFFICE VISIT (OUTPATIENT)
Dept: FAMILY MEDICINE CLINIC | Facility: CLINIC | Age: 47
End: 2022-10-31

## 2022-10-31 VITALS
SYSTOLIC BLOOD PRESSURE: 138 MMHG | BODY MASS INDEX: 27.59 KG/M2 | WEIGHT: 175.8 LBS | DIASTOLIC BLOOD PRESSURE: 70 MMHG | HEIGHT: 67 IN

## 2022-10-31 VITALS
SYSTOLIC BLOOD PRESSURE: 122 MMHG | TEMPERATURE: 96.1 F | OXYGEN SATURATION: 98 % | DIASTOLIC BLOOD PRESSURE: 80 MMHG | BODY MASS INDEX: 27.65 KG/M2 | HEIGHT: 67 IN | HEART RATE: 68 BPM | WEIGHT: 176.2 LBS

## 2022-10-31 DIAGNOSIS — F43.12 NIGHTMARES ASSOCIATED WITH CHRONIC POST-TRAUMATIC STRESS DISORDER: ICD-10-CM

## 2022-10-31 DIAGNOSIS — F12.10 CANNABIS ABUSE, EPISODIC: ICD-10-CM

## 2022-10-31 DIAGNOSIS — R30.0 DYSURIA: Primary | ICD-10-CM

## 2022-10-31 DIAGNOSIS — Z23 NEED FOR INFLUENZA VACCINATION: ICD-10-CM

## 2022-10-31 DIAGNOSIS — F40.10 SOCIAL ANXIETY DISORDER: ICD-10-CM

## 2022-10-31 DIAGNOSIS — F51.5 NIGHTMARES ASSOCIATED WITH CHRONIC POST-TRAUMATIC STRESS DISORDER: ICD-10-CM

## 2022-10-31 DIAGNOSIS — F41.1 GAD (GENERALIZED ANXIETY DISORDER): ICD-10-CM

## 2022-10-31 DIAGNOSIS — F43.12 CHRONIC POST-TRAUMATIC STRESS DISORDER (PTSD): Primary | ICD-10-CM

## 2022-10-31 DIAGNOSIS — F17.200 NICOTINE USE DISORDER: ICD-10-CM

## 2022-10-31 LAB
BILIRUB BLD-MCNC: ABNORMAL MG/DL
CLARITY, POC: ABNORMAL
COLOR UR: YELLOW
EXPIRATION DATE: ABNORMAL
GLUCOSE UR STRIP-MCNC: NEGATIVE MG/DL
KETONES UR QL: ABNORMAL
LEUKOCYTE EST, POC: ABNORMAL
Lab: ABNORMAL
NITRITE UR-MCNC: NEGATIVE MG/ML
PH UR: 6 [PH] (ref 5–8)
PROT UR STRIP-MCNC: ABNORMAL MG/DL
RBC # UR STRIP: NEGATIVE /UL
SP GR UR: 1.03 (ref 1–1.03)
UROBILINOGEN UR QL: NORMAL

## 2022-10-31 PROCEDURE — 99213 OFFICE O/P EST LOW 20 MIN: CPT | Performed by: FAMILY MEDICINE

## 2022-10-31 PROCEDURE — 99214 OFFICE O/P EST MOD 30 MIN: CPT | Performed by: PSYCHIATRY & NEUROLOGY

## 2022-10-31 RX ORDER — PHENAZOPYRIDINE HYDROCHLORIDE 200 MG/1
200 TABLET, FILM COATED ORAL 3 TIMES DAILY PRN
Qty: 6 TABLET | Refills: 0 | Status: SHIPPED | OUTPATIENT
Start: 2022-10-31 | End: 2022-11-02

## 2022-10-31 RX ORDER — CEPHALEXIN 500 MG/1
500 CAPSULE ORAL 2 TIMES DAILY
COMMUNITY
Start: 2022-10-26 | End: 2022-11-21

## 2022-10-31 RX ORDER — NITROFURANTOIN 25; 75 MG/1; MG/1
100 CAPSULE ORAL 2 TIMES DAILY
COMMUNITY
Start: 2022-08-19 | End: 2022-11-21

## 2022-10-31 RX ORDER — FLUCONAZOLE 150 MG/1
150 TABLET ORAL ONCE
Qty: 1 TABLET | Refills: 0 | Status: SHIPPED | OUTPATIENT
Start: 2022-10-31 | End: 2022-10-31

## 2022-10-31 RX ORDER — PHENAZOPYRIDINE HYDROCHLORIDE 100 MG/1
TABLET, FILM COATED ORAL
COMMUNITY
Start: 2022-10-25 | End: 2022-11-21 | Stop reason: SDUPTHER

## 2022-10-31 RX ORDER — PROPRANOLOL HYDROCHLORIDE 20 MG/1
TABLET ORAL
Qty: 60 TABLET | Refills: 1 | Status: SHIPPED | OUTPATIENT
Start: 2022-10-31 | End: 2023-01-30 | Stop reason: SDUPTHER

## 2022-10-31 RX ORDER — SULFAMETHOXAZOLE AND TRIMETHOPRIM 800; 160 MG/1; MG/1
1 TABLET ORAL 2 TIMES DAILY
Qty: 10 TABLET | Refills: 0 | Status: SHIPPED | OUTPATIENT
Start: 2022-10-31 | End: 2022-11-05

## 2022-10-31 NOTE — PROGRESS NOTES
Subjective   Meghan Rivas is a 46 y.o. female who presents today for follow up    Chief Complaint: History of bipolar disorder, PTSD, substance abuse, depression, anxiety    History of Present Illness: Patient presenting today for follow up. Since last visit, she reports she is doing relatively well. She reports no major mood or anxiety symptoms. She does have some anxiety at work and has some of her counting behaviors and OCD like symptoms due to stress. She has not had any nightmares recently and has not required her nightmare medication. She does take propranolol at night which does make a difference. She had one therapy appointment so far and is excited to get into targeted therapy for trauma. She is not having medication side effects. Sleep and appetite are appropriate. She is not having medication side effects. She denies SI/HI/AVH.     The following portions of the patient's history were reviewed and updated as appropriate: allergies, current medications, past family history, past medical history, past social history, past surgical history and problem list.      Past Medical History:  Past Medical History:   Diagnosis Date   • Anxiety    • Depression    • Disease of thyroid gland    • GERD (gastroesophageal reflux disease)    • Hepatitis C    • History of drug abuse (HCC)    • Hypertension    • Seizures (HCC)     LAST SEIZURE 2019       Social History:  Social History     Socioeconomic History   • Marital status:    Tobacco Use   • Smoking status: Every Day     Packs/day: 0.25     Years: 3.50     Pack years: 0.88     Types: Cigarettes   • Smokeless tobacco: Never   Vaping Use   • Vaping Use: Former   Substance and Sexual Activity   • Alcohol use: Not Currently     Comment: sober 4 years   • Drug use: Not Currently     Types: IV, Heroin, Methamphetamines     Comment: clean 5 years   • Sexual activity: Yes     Partners: Male       Family History:  Family History   Adopted: Yes   Problem Relation Age of  Onset   • No Known Problems Adoptive Father    • Behavior problems Adoptive Mother        Past Surgical History:  Past Surgical History:   Procedure Laterality Date   • APPENDECTOMY N/A 3/8/2022    Procedure: APPENDECTOMY LAPAROSCOPIC;  Surgeon: Babar Perkins MD;  Location: [facility] OR;  Service: General;  Laterality: N/A;   • COLONOSCOPY N/A 10/5/2022    Procedure: COLONOSCOPY;  Surgeon: Neris Bear MD;  Location: [facility] OR;  Service: Gastroenterology;  Laterality: N/A;   • OVARIAN CYST SURGERY      x7 surgeries       Problem List:  Patient Active Problem List   Diagnosis   • Chronic hepatitis C without hepatic coma (HCC)   • MVP (mitral valve prolapse)   • Hx of drug abuse (HCC)   • Leg neuralgia, right   • Anxiety and depression   • Gastroesophageal reflux disease   • Syncope   • Acute appendicitis   • Chronic idiopathic constipation       Allergy:   Allergies   Allergen Reactions   • Contrast Dye Anaphylaxis     Tolerated IV contrast CT 3/7/2022        Current Medications:   Current Outpatient Medications   Medication Sig Dispense Refill   • albuterol sulfate  (90 Base) MCG/ACT inhaler Inhale 2 puffs Every 6 (Six) Hours As Needed for Wheezing. 6.7 g 2   • ARIPiprazole (ABILIFY) 5 MG tablet Take 1 tablet by mouth Every Night. 30 tablet 2   • fluconazole (Diflucan) 150 MG tablet Take 1 tablet by mouth 1 (One) Time for 1 dose. 1 tablet 0   • ibuprofen (ADVIL,MOTRIN) 600 MG tablet Take 1 tablet by mouth Every 6 (Six) Hours As Needed for Mild Pain . 20 tablet 0   • levothyroxine (Synthroid) 50 MCG tablet Take 1 tablet by mouth Every Morning. 60 tablet 2   • LORazepam (Ativan) 0.5 MG tablet Take 1 tablet by mouth Daily As Needed for Anxiety. 28 tablet 0   • nitrofurantoin, macrocrystal-monohydrate, (MACROBID) 100 MG capsule Take 1 capsule by mouth 2 (Two) Times a Day.     • omeprazole (PrilOSEC) 40 MG capsule Take 1 capsule by mouth Daily. 30 capsule 2   • phenazopyridine (PYRIDIUM) 100 MG  tablet TAKE 1 TABLET BY MOUTH THREE TIMES DAILY AS NEEDED FOR PAIN FOR 2 DAYS     • phenazopyridine (Pyridium) 200 MG tablet Take 1 tablet by mouth 3 (Three) Times a Day As Needed for Bladder Spasms for up to 2 days. 6 tablet 0   • polyethylene glycol (MIRALAX) 17 g packet Take 17 g by mouth 2 (Two) Times a Day. 100 each 2   • venlafaxine XR (EFFEXOR-XR) 150 MG 24 hr capsule Take 1 capsule by mouth Daily. 30 capsule 2   • cephalexin (KEFLEX) 500 MG capsule Take 1 capsule by mouth 2 (Two) Times a Day.     • docusate sodium (COLACE) 100 MG capsule Take 1 capsule by mouth 2 (Two) Times a Day. 60 capsule 2   • propranolol (INDERAL) 20 MG tablet Take 0.5 to 1 tablet PO up to twice daily as needed for anxiety. 60 tablet 1   • sulfamethoxazole-trimethoprim (Bactrim DS) 800-160 MG per tablet Take 1 tablet by mouth 2 (Two) Times a Day for 5 days. 10 tablet 0     No current facility-administered medications for this visit.       Review of Symptoms:    Review of Systems   Constitutional: Negative for activity change and fatigue.   HENT: Negative for congestion and rhinorrhea.    Eyes: Negative for blurred vision and visual disturbance.   Respiratory: Negative for cough and shortness of breath.    Cardiovascular: Negative for chest pain and palpitations.   Gastrointestinal: Negative for nausea and vomiting.   Endocrine: Negative for cold intolerance and heat intolerance.   Genitourinary: Negative for dysuria and frequency.   Musculoskeletal: Negative for arthralgias and myalgias.   Skin: Negative for rash and wound.   Allergic/Immunologic: Negative for environmental allergies and food allergies.   Neurological: Negative for weakness and confusion.   Hematological: Negative for adenopathy. Does not bruise/bleed easily.   Psychiatric/Behavioral: Negative for agitation, decreased concentration, dysphoric mood, sleep disturbance, suicidal ideas, depressed mood and stress. The patient is nervous/anxious.          Physical Exam:  "  Blood pressure 138/70, height 170.2 cm (67.01\"), weight 79.7 kg (175 lb 12.8 oz), not currently breastfeeding.    Appearance: CF of stated age, NAD   Gait, Station, Strength: WNL    Mental Status Exam:   Hygiene:   good  Cooperation:  Cooperative  Eye Contact:  Good  Psychomotor Behavior:  Appropriate  Affect:  Full range  Mood: normal-with some anxiety OCD symptoms   Hopelessness: Optimistic  Speech:  Normal  Thought Process:  Goal directed and Linear  Thought Content:  Normal and Mood congruent  Suicidal:  None, resolved  Homicidal:  None  Hallucinations:  None  Delusion:  None  Memory:  Intact  Orientation:  Person, Place, Time and Situation  Reliability:  good  Insight:  Fair  Judgement:  Good  Impulse Control:  Good      Lab Results:   Office Visit on 10/31/2022   Component Date Value Ref Range Status   • Color 10/31/2022 Yellow  Yellow, Straw, Dark Yellow, Jessi Final   • Clarity, UA 10/31/2022 Hazy (A)  Clear Final   • Specific Gravity  10/31/2022 1.030  1.005 - 1.030 Final   • pH, Urine 10/31/2022 6.0  5.0 - 8.0 Final   • Leukocytes 10/31/2022 Trace (A)  Negative Final   • Nitrite, UA 10/31/2022 Negative  Negative Final   • Protein, POC 10/31/2022 1+ (A)  Negative mg/dL Final   • Glucose, UA 10/31/2022 Negative  Negative mg/dL Final   • Ketones, UA 10/31/2022 Trace (A)  Negative Final   • Urobilinogen, UA 10/31/2022 Normal  Normal, 0.2 E.U./dL Final   • Bilirubin 10/31/2022 Small (1+) (A)  Negative Final   • Blood, UA 10/31/2022 Negative  Negative Final   • Lot Number 10/31/2022 [id number]   Final   • Expiration Date 10/31/2022 [id number]   Final   Office Visit on 10/24/2022   Component Date Value Ref Range Status   • TSH 10/24/2022 3.340  0.270 - 4.200 uIU/mL Final   • Free T4 10/24/2022 1.05  0.93 - 1.70 ng/dL Final   • Glucose 10/24/2022 85  65 - 99 mg/dL Final   • BUN 10/24/2022 12  6 - 20 mg/dL Final   • Creatinine 10/24/2022 0.53 (L)  0.57 - 1.00 mg/dL Final   • Sodium 10/24/2022 141  136 - 145 " mmol/L Final   • Potassium 10/24/2022 4.3  3.5 - 5.2 mmol/L Final    Slight hemolysis detected by analyzer. Results may be affected.   • Chloride 10/24/2022 106  98 - 107 mmol/L Final   • CO2 10/24/2022 28.0  22.0 - 29.0 mmol/L Final   • Calcium 10/24/2022 9.5  8.6 - 10.5 mg/dL Final   • Total Protein 10/24/2022 7.2  6.0 - 8.5 g/dL Final   • Albumin 10/24/2022 4.10  3.50 - 5.20 g/dL Final   • ALT (SGPT) 10/24/2022 6  1 - 33 U/L Final   • AST (SGOT) 10/24/2022 26  1 - 32 U/L Final   • Alkaline Phosphatase 10/24/2022 52  39 - 117 U/L Final   • Total Bilirubin 10/24/2022 <0.2  0.0 - 1.2 mg/dL Final   • Globulin 10/24/2022 3.1  gm/dL Final   • A/G Ratio 10/24/2022 1.3  g/dL Final   • BUN/Creatinine Ratio 10/24/2022 22.6  7.0 - 25.0 Final   • Anion Gap 10/24/2022 7.0  5.0 - 15.0 mmol/L Final   • eGFR 10/24/2022 115.7  >60.0 mL/min/1.73 Final    National Kidney Foundation and American Society of Nephrology (ASN) Task Force recommended calculation based on the Chronic Kidney Disease Epidemiology Collaboration (CKD-EPI) equation refit without adjustment for race.   Admission on 10/05/2022, Discharged on 10/05/2022   Component Date Value Ref Range Status   • HCG, Urine, QL 10/05/2022 Negative  Negative Final   • Lot Number 10/05/2022 [id number]   Final   • Internal Positive Control 10/05/2022 Positive  Positive, Passed Final   • Internal Negative Control 10/05/2022 Negative  Negative, Passed Final   • Expiration Date 10/05/2022 02/29/2024   Final       Assessment & Plan   Diagnoses and all orders for this visit:    1. Chronic post-traumatic stress disorder (PTSD) (Primary)    2. AMARIS (generalized anxiety disorder)  -     propranolol (INDERAL) 20 MG tablet; Take 0.5 to 1 tablet PO up to twice daily as needed for anxiety.  Dispense: 60 tablet; Refill: 1    3. Social anxiety disorder  -     propranolol (INDERAL) 20 MG tablet; Take 0.5 to 1 tablet PO up to twice daily as needed for anxiety.  Dispense: 60 tablet; Refill: 1    4.  Nightmares associated with chronic post-traumatic stress disorder    5. Nicotine use disorder    6. Cannabis abuse, episodic    -Patient doing relatively well. Nightmares have improved. She is not having any mood or anxiety symptoms.   -Reviewed previous available documentation  -Reviewed most recent available labs   -NALINI reviewed and appropriate. Patient counseled on use of controlled substances.   -Continue Abilify 5 mg p.o. nightly for mood stabilization and anxiety  -Continue Effexor  mg p.o. daily for mood and anxiety  -Continue propranolol 10 to 20 mg p.o. as needed 30 minutes to an hour prior to anxiety provoking event for situational and social anxiety  -Continue prazosin 2 mg p.o. nightly for nightmares related to PTSD  -Continue Ativan 0.5 mg p.o. daily as needed for anxiety.  Advised patient to only take the medication as needed in times of crisis  -Encourage cessation of nicotine  -Encourage cessation of cannabis  -Encouraged continued cessation of methamphetamine  -Patient interested in therapy and will refer for therapist today      Visit Diagnoses:    ICD-10-CM ICD-9-CM   1. Chronic post-traumatic stress disorder (PTSD)  F43.12 309.81   2. AMARIS (generalized anxiety disorder)  F41.1 300.02   3. Social anxiety disorder  F40.10 300.23   4. Nightmares associated with chronic post-traumatic stress disorder  F51.5 307.47    F43.12 309.81   5. Nicotine use disorder  F17.200 305.1   6. Cannabis abuse, episodic  F12.10 305.22       TREATMENT PLAN/GOALS: Continue supportive psychotherapy efforts and medications as indicated. Treatment and medication options discussed during today's visit. Patient acknowledged and verbally consented to continue with current treatment plan and was educated on the importance of compliance with treatment and follow-up appointments.    MEDICATION ISSUES:    Discussed medication options and treatment plan of prescribed medication as well as the risks, benefits, and side  effects including potential falls, possible impaired driving and metabolic adversities among others. Patient is agreeable to call the office with any worsening of symptoms or onset of side effects. Patient is agreeable to call 911 or go to the nearest ER should he/she begin having SI/HI.     MEDS ORDERED DURING VISIT:  New Medications Ordered This Visit   Medications   • propranolol (INDERAL) 20 MG tablet     Sig: Take 0.5 to 1 tablet PO up to twice daily as needed for anxiety.     Dispense:  60 tablet     Refill:  1       Return in about 8 weeks (around 12/26/2022).             This document has been electronically signed by Giacomo Phipps MD  October 31, 2022 10:22 EDT    Dictated using Dragon Dictation.

## 2022-10-31 NOTE — PROGRESS NOTES
"Chief Complaint  Dysuria    Subjective          Meghan Rivas presents to [facility]  Urinary Tract Infection   This is a new problem. The current episode started in the past 7 days. The problem has been unchanged. The quality of the pain is described as burning and shooting. The pain is moderate. The maximum temperature recorded prior to her arrival was 100 - 100.9 F. The fever has been present for 1 - 2 days. Associated symptoms include flank pain, frequency, hesitancy and urgency. She has tried NSAIDs and increased fluids for the symptoms. The treatment provided mild relief. Her past medical history is significant for recurrent UTIs.       Review of Systems   Genitourinary: Positive for flank pain, frequency, hesitancy and urgency.         Objective   Vital Signs:   /80 (BP Location: Right arm, Patient Position: Sitting, Cuff Size: Adult)   Pulse 68   Temp 96.1 °F (35.6 °C) (Temporal)   Ht 170.2 cm (67\")   Wt 79.9 kg (176 lb 3.2 oz)   SpO2 98%   BMI 27.60 kg/m²     Physical Exam  Constitutional:       General: She is not in acute distress.     Appearance: Normal appearance. She is well-developed and well-groomed. She is not ill-appearing, toxic-appearing or diaphoretic.   HENT:      Head: Normocephalic.      Nose: Nose normal. No congestion or rhinorrhea.      Mouth/Throat:      Mouth: Mucous membranes are moist.      Pharynx: Oropharynx is clear. No oropharyngeal exudate or posterior oropharyngeal erythema.   Eyes:      General: Lids are normal.         Right eye: No discharge.         Left eye: No discharge.      Extraocular Movements: Extraocular movements intact.      Pupils: Pupils are equal, round, and reactive to light.   Neck:      Vascular: No carotid bruit.   Cardiovascular:      Rate and Rhythm: Normal rate and regular rhythm.      Pulses: Normal pulses.      Heart sounds: Normal heart sounds. No murmur heard.    No friction rub. No gallop.   Pulmonary:     "  Effort: Pulmonary effort is normal. No respiratory distress.      Breath sounds: Normal breath sounds. No stridor. No wheezing, rhonchi or rales.   Chest:      Chest wall: No tenderness.   Abdominal:      General: Bowel sounds are normal. There is no distension.      Palpations: Abdomen is soft. There is no mass.      Tenderness: There is no abdominal tenderness. There is no right CVA tenderness, left CVA tenderness, guarding or rebound.      Hernia: No hernia is present.   Musculoskeletal:         General: No swelling or tenderness. Normal range of motion.      Cervical back: Normal range of motion and neck supple. No rigidity or tenderness.      Right lower leg: No edema.      Left lower leg: No edema.   Lymphadenopathy:      Cervical: No cervical adenopathy.   Skin:     General: Skin is warm.      Capillary Refill: Capillary refill takes less than 2 seconds.      Coloration: Skin is not jaundiced.      Findings: No bruising, erythema or rash.   Neurological:      General: No focal deficit present.      Mental Status: She is alert and oriented to person, place, and time.      Motor: Motor function is intact. No weakness.      Coordination: Coordination is intact.      Gait: Gait is intact. Gait normal.   Psychiatric:         Attention and Perception: Attention normal.         Mood and Affect: Mood normal.         Speech: Speech normal.         Behavior: Behavior normal.         Cognition and Memory: Cognition normal.         Judgment: Judgment normal.        Result Review :                 Assessment and Plan    Diagnoses and all orders for this visit:    1. Dysuria (Primary)  -     POCT urinalysis dipstick, automated    2. Need for influenza vaccination  -     Cancel: FluLaval/Fluzone >6 mos ([id number])    Other orders  -     sulfamethoxazole-trimethoprim (Bactrim DS) 800-160 MG per tablet; Take 1 tablet by mouth 2 (Two) Times a Day for 5 days.  Dispense: 10 tablet; Refill: 0  -     phenazopyridine (Pyridium)  200 MG tablet; Take 1 tablet by mouth 3 (Three) Times a Day As Needed for Bladder Spasms for up to 2 days.  Dispense: 6 tablet; Refill: 0  -     fluconazole (Diflucan) 150 MG tablet; Take 1 tablet by mouth 1 (One) Time for 1 dose.  Dispense: 1 tablet; Refill: 0      Patient's Body mass index is 27.6 kg/m². indicating that she is overweight (BMI 25-29.9). Patient's (Body mass index is 27.6 kg/m².) indicates that they are overweight with health conditions that include GERD . Weight is unchanged. BMI is is above average; BMI management plan is completed. We discussed low calorie, low carb based diet program, portion control and increasing exercise. .    Follow Up   Return if symptoms worsen or fail to improve, for Next scheduled follow up.  Patient was given instructions and counseling regarding her condition or for health maintenance advice. Please see specific information pulled into the AVS if appropriate.     This document has been electronically signed by REBECCA Lopes  October 31, 2022 13:08 EDT

## 2022-11-14 ENCOUNTER — OFFICE VISIT (OUTPATIENT)
Dept: GASTROENTEROLOGY | Facility: CLINIC | Age: 47
End: 2022-11-14

## 2022-11-14 VITALS
DIASTOLIC BLOOD PRESSURE: 79 MMHG | WEIGHT: 177.6 LBS | HEART RATE: 72 BPM | SYSTOLIC BLOOD PRESSURE: 122 MMHG | BODY MASS INDEX: 27.88 KG/M2 | HEIGHT: 67 IN

## 2022-11-14 DIAGNOSIS — R15.1 FECAL SMEARING: Primary | ICD-10-CM

## 2022-11-14 PROCEDURE — 99213 OFFICE O/P EST LOW 20 MIN: CPT | Performed by: PHYSICIAN ASSISTANT

## 2022-11-14 RX ORDER — DIPHENOXYLATE HYDROCHLORIDE AND ATROPINE SULFATE 2.5; .025 MG/1; MG/1
1 TABLET ORAL DAILY
Qty: 30 TABLET | Refills: 1 | Status: SHIPPED | OUTPATIENT
Start: 2022-11-14 | End: 2022-11-21 | Stop reason: SDDI

## 2022-11-14 NOTE — PROGRESS NOTES
"Chief Complaint   Patient presents with   • Constipation       Meghan Rivas is a 46 y.o. female who presents to the office today for evaluation of Constipation  .    HPI    Mrs. Rivas is a 46-year-old female who presents for a follow-up for constipation. She was last seen in 08/22/2022 when she was started on Trulance 3 mg once daily for constipation. She underwent a colonoscopy procedure on 10/05/2022, which showed normal perianal; and digital rectal exams. Colon appeared normal. Grade I internal hemorrhoids. Repeat colonoscopy in 3 years for screening purposes. Dr. Chappell started her on FiberCon.    She has a unknown family history due to adoption.    The patient reports she is not doing good at all. She states she is experiencing a lot of constipation, and it is starting to affect her at work. She notes she wont feel it, but she will start to smell it or feel gross. She reports she will go to the bathroom and it \"looks like mud has been caked into her panties.\" The patient states it is mostly soft, but when it is hard \"it is like little rocks.\" She notes the fiber is not really making a difference. The patient reports she has had to leave work due to the leakage. She states MirLAX worked well for her in the past in regards to the constipation. She states she has a hard time taking in enough fluids.    Review of Systems   Constitutional: Negative.    HENT: Negative for trouble swallowing.    Eyes: Negative.    Respiratory: Negative for chest tightness.    Cardiovascular: Negative for chest pain.   Gastrointestinal: Positive for abdominal distention, abdominal pain, anal bleeding, blood in stool, constipation, diarrhea, nausea and vomiting.   Endocrine: Negative.    Genitourinary: Positive for dysuria.   Musculoskeletal: Negative.    Skin: Negative.    Allergic/Immunologic: Negative.    Neurological: Negative.    Hematological: Negative.    Psychiatric/Behavioral: Negative.        ACTIVE PROBLEMS:   Specialty " Problems        Gastroenterology Problems    Chronic hepatitis C without hepatic coma (HCC)        Gastroesophageal reflux disease        Acute appendicitis        Chronic idiopathic constipation           PAST MEDICAL HISTORY:  Past Medical History:   Diagnosis Date   • Anxiety    • Depression    • Disease of thyroid gland    • GERD (gastroesophageal reflux disease)    • Hepatitis C    • History of drug abuse (HCC)    • Hypertension    • Seizures (HCC)     LAST SEIZURE 2019       SURGICAL HISTORY:  Past Surgical History:   Procedure Laterality Date   • APPENDECTOMY N/A 3/8/2022    Procedure: APPENDECTOMY LAPAROSCOPIC;  Surgeon: Babar Perkins MD;  Location: [facility] OR;  Service: General;  Laterality: N/A;   • COLONOSCOPY N/A 10/5/2022    Procedure: COLONOSCOPY;  Surgeon: Neris Bear MD;  Location: [facility] OR;  Service: Gastroenterology;  Laterality: N/A;   • OVARIAN CYST SURGERY      x7 surgeries       FAMILY HISTORY:  Family History   Adopted: Yes   Problem Relation Age of Onset   • No Known Problems Adoptive Father    • Behavior problems Adoptive Mother        SOCIAL HISTORY:  Social History     Tobacco Use   • Smoking status: Every Day     Packs/day: 0.25     Years: 3.50     Pack years: 0.88     Types: Cigarettes   • Smokeless tobacco: Never   Substance Use Topics   • Alcohol use: Not Currently     Comment: sober 4 years       CURRENT MEDICATION:    Current Outpatient Medications:   •  albuterol sulfate  (90 Base) MCG/ACT inhaler, Inhale 2 puffs Every 6 (Six) Hours As Needed for Wheezing., Disp: 6.7 g, Rfl: 2  •  ARIPiprazole (ABILIFY) 5 MG tablet, Take 1 tablet by mouth Every Night., Disp: 30 tablet, Rfl: 2  •  cephalexin (KEFLEX) 500 MG capsule, Take 1 capsule by mouth 2 (Two) Times a Day., Disp: , Rfl:   •  docusate sodium (COLACE) 100 MG capsule, Take 1 capsule by mouth 2 (Two) Times a Day., Disp: 60 capsule, Rfl: 2  •  ibuprofen (ADVIL,MOTRIN) 600 MG tablet, Take 1 tablet by mouth  "Every 6 (Six) Hours As Needed for Mild Pain ., Disp: 20 tablet, Rfl: 0  •  levothyroxine (Synthroid) 50 MCG tablet, Take 1 tablet by mouth Every Morning., Disp: 60 tablet, Rfl: 2  •  LORazepam (Ativan) 0.5 MG tablet, Take 1 tablet by mouth Daily As Needed for Anxiety., Disp: 28 tablet, Rfl: 0  •  nitrofurantoin, macrocrystal-monohydrate, (MACROBID) 100 MG capsule, Take 1 capsule by mouth 2 (Two) Times a Day., Disp: , Rfl:   •  omeprazole (PrilOSEC) 40 MG capsule, Take 1 capsule by mouth Daily., Disp: 30 capsule, Rfl: 2  •  phenazopyridine (PYRIDIUM) 100 MG tablet, TAKE 1 TABLET BY MOUTH THREE TIMES DAILY AS NEEDED FOR PAIN FOR 2 DAYS, Disp: , Rfl:   •  polyethylene glycol (MIRALAX) 17 g packet, Take 17 g by mouth 2 (Two) Times a Day., Disp: 100 each, Rfl: 2  •  propranolol (INDERAL) 20 MG tablet, Take 0.5 to 1 tablet PO up to twice daily as needed for anxiety., Disp: 60 tablet, Rfl: 1  •  venlafaxine XR (EFFEXOR-XR) 150 MG 24 hr capsule, Take 1 capsule by mouth Daily., Disp: 30 capsule, Rfl: 2  •  diphenoxylate-atropine (Lomotil) 2.5-0.025 MG per tablet, Take 1 tablet by mouth Daily., Disp: 30 tablet, Rfl: 1    ALLERGIES:  Contrast dye    VISIT VITALS:  /79   Pulse 72   Ht 170.2 cm (67\")   Wt 80.6 kg (177 lb 9.6 oz)   BMI 27.82 kg/m²   Physical Exam  Constitutional:       General: She is not in acute distress.     Appearance: Normal appearance. She is well-developed.   HENT:      Head: Normocephalic and atraumatic.   Eyes:      Pupils: Pupils are equal, round, and reactive to light.   Cardiovascular:      Rate and Rhythm: Normal rate and regular rhythm.      Heart sounds: Normal heart sounds.   Pulmonary:      Effort: Pulmonary effort is normal. No respiratory distress.      Breath sounds: Normal breath sounds. No wheezing, rhonchi or rales.   Abdominal:      General: Abdomen is flat. Bowel sounds are normal. There is no distension.      Palpations: Abdomen is soft. There is no mass.      Tenderness: There " is no abdominal tenderness. There is no guarding or rebound.      Hernia: No hernia is present.   Musculoskeletal:         General: No swelling. Normal range of motion.      Cervical back: Normal range of motion and neck supple.      Right lower leg: No edema.      Left lower leg: No edema.   Skin:     General: Skin is warm and dry.   Neurological:      Mental Status: She is alert and oriented to person, place, and time.   Psychiatric:         Attention and Perception: Attention normal.         Mood and Affect: Mood normal.         Speech: Speech normal.         Behavior: Behavior normal. Behavior is cooperative.         Thought Content: Thought content normal.         Assessment       Diagnosis Plan   1. Fecal smearing  diphenoxylate-atropine (Lomotil) 2.5-0.025 MG per tablet          Return in about 4 weeks (around 12/12/2022).           1. Constipation  -The patient will start Lomotil once daily for fecal incontinence.  -She will use MiraLAX PRN for contipation.  -She will return to the clinic in 1 month.          This document has been electronically signed by Cristiano Baez PA-C  November 14, 2022 18:55 EST    Part of this note may be an electronic transcription/translation of spoken language to printed text using the Dragon Dictation System.    Transcribed from ambient dictation for Cristiano Baez PA-C by Martha Waldrop.  11/14/22   14:38 EST    Patient or patient representative verbalized consent to the visit recording.  I have personally performed the services described in this document as transcribed by the above individual, and it is both accurate and complete.

## 2022-11-18 ENCOUNTER — TELEPHONE (OUTPATIENT)
Dept: FAMILY MEDICINE CLINIC | Facility: CLINIC | Age: 47
End: 2022-11-18

## 2022-11-18 DIAGNOSIS — Z12.31 ENCOUNTER FOR SCREENING MAMMOGRAM FOR MALIGNANT NEOPLASM OF BREAST: Primary | ICD-10-CM

## 2022-11-18 NOTE — TELEPHONE ENCOUNTER
Caller: Meghan Rivas    Relationship: Self    Best call back number: [phone]    What orders are you requesting (i.e. lab or imaging): MAMMOGRAM    In what timeframe would the patient need to come in: ANYTIME    Where will you receive your lab/imaging services: [facility]

## 2022-11-21 ENCOUNTER — OFFICE VISIT (OUTPATIENT)
Dept: FAMILY MEDICINE CLINIC | Facility: CLINIC | Age: 47
End: 2022-11-21

## 2022-11-21 VITALS
TEMPERATURE: 94.6 F | BODY MASS INDEX: 28 KG/M2 | DIASTOLIC BLOOD PRESSURE: 70 MMHG | SYSTOLIC BLOOD PRESSURE: 120 MMHG | WEIGHT: 178.4 LBS | OXYGEN SATURATION: 96 % | HEIGHT: 67 IN | HEART RATE: 78 BPM

## 2022-11-21 DIAGNOSIS — R30.0 DYSURIA: Primary | ICD-10-CM

## 2022-11-21 LAB
BILIRUB BLD-MCNC: NEGATIVE MG/DL
CLARITY, POC: ABNORMAL
COLOR UR: ABNORMAL
EXPIRATION DATE: ABNORMAL
GLUCOSE UR STRIP-MCNC: NEGATIVE MG/DL
KETONES UR QL: NEGATIVE
LEUKOCYTE EST, POC: ABNORMAL
Lab: ABNORMAL
NITRITE UR-MCNC: NEGATIVE MG/ML
PH UR: 6 [PH] (ref 5–8)
PROT UR STRIP-MCNC: ABNORMAL MG/DL
RBC # UR STRIP: ABNORMAL /UL
SP GR UR: 1.03 (ref 1–1.03)
UROBILINOGEN UR QL: NORMAL

## 2022-11-21 PROCEDURE — 87086 URINE CULTURE/COLONY COUNT: CPT | Performed by: FAMILY MEDICINE

## 2022-11-21 PROCEDURE — 87186 SC STD MICRODIL/AGAR DIL: CPT | Performed by: FAMILY MEDICINE

## 2022-11-21 PROCEDURE — 99213 OFFICE O/P EST LOW 20 MIN: CPT | Performed by: FAMILY MEDICINE

## 2022-11-21 PROCEDURE — 87088 URINE BACTERIA CULTURE: CPT | Performed by: FAMILY MEDICINE

## 2022-11-21 RX ORDER — NITROFURANTOIN 25; 75 MG/1; MG/1
100 CAPSULE ORAL 2 TIMES DAILY
Qty: 10 CAPSULE | Refills: 0 | Status: SHIPPED | OUTPATIENT
Start: 2022-11-21 | End: 2023-01-09

## 2022-11-21 RX ORDER — PHENAZOPYRIDINE HYDROCHLORIDE 100 MG/1
100 TABLET, FILM COATED ORAL 3 TIMES DAILY PRN
Qty: 6 TABLET | Refills: 0 | Status: SHIPPED | OUTPATIENT
Start: 2022-11-21 | End: 2023-01-30

## 2022-11-21 NOTE — PROGRESS NOTES
"Chief Complaint  dusuria    Krystal Rivas presents to [facility]  Difficulty Urinating  This is a new problem. The current episode started in the past 7 days. The problem has been gradually worsening. Associated symptoms include urinary symptoms. She has tried drinking for the symptoms. The treatment provided mild relief.       Review of Systems   Genitourinary: Positive for difficulty urinating.         Objective   Vital Signs:   /70 (BP Location: Right arm, Patient Position: Sitting, Cuff Size: Adult)   Pulse 78   Temp 94.6 °F (34.8 °C) (Temporal)   Ht 170.2 cm (67\")   Wt 80.9 kg (178 lb 6.4 oz)   SpO2 96%   BMI 27.94 kg/m²     Physical Exam  Constitutional:       General: She is not in acute distress.     Appearance: Normal appearance. She is well-developed and well-groomed. She is not ill-appearing, toxic-appearing or diaphoretic.   HENT:      Head: Normocephalic.      Nose: Nose normal. No congestion or rhinorrhea.      Mouth/Throat:      Mouth: Mucous membranes are moist.      Pharynx: Oropharynx is clear. No oropharyngeal exudate or posterior oropharyngeal erythema.   Eyes:      General: Lids are normal.         Right eye: No discharge.         Left eye: No discharge.      Extraocular Movements: Extraocular movements intact.      Pupils: Pupils are equal, round, and reactive to light.   Neck:      Vascular: No carotid bruit.   Cardiovascular:      Rate and Rhythm: Normal rate and regular rhythm.      Pulses: Normal pulses.      Heart sounds: Normal heart sounds. No murmur heard.    No friction rub. No gallop.   Pulmonary:      Effort: Pulmonary effort is normal. No respiratory distress.      Breath sounds: Normal breath sounds. No stridor. No wheezing, rhonchi or rales.   Chest:      Chest wall: No tenderness.   Abdominal:      General: Bowel sounds are normal. There is no distension.      Palpations: Abdomen is soft. There is no mass.      " Tenderness: There is no abdominal tenderness. There is no right CVA tenderness, left CVA tenderness, guarding or rebound.      Hernia: No hernia is present.   Musculoskeletal:         General: No swelling or tenderness. Normal range of motion.      Cervical back: Normal range of motion and neck supple. No rigidity or tenderness.      Right lower leg: No edema.      Left lower leg: No edema.   Lymphadenopathy:      Cervical: No cervical adenopathy.   Skin:     General: Skin is warm.      Capillary Refill: Capillary refill takes less than 2 seconds.      Coloration: Skin is not jaundiced.      Findings: No bruising, erythema or rash.   Neurological:      General: No focal deficit present.      Mental Status: She is alert and oriented to person, place, and time.      Motor: Motor function is intact. No weakness.      Coordination: Coordination is intact.      Gait: Gait is intact. Gait normal.   Psychiatric:         Attention and Perception: Attention normal.         Mood and Affect: Mood normal.         Speech: Speech normal.         Behavior: Behavior normal.         Cognition and Memory: Cognition normal.         Judgment: Judgment normal.        Result Review :                 Assessment and Plan    Diagnoses and all orders for this visit:    1. Dysuria (Primary)  -     Urine Culture - Urine, Urine, Clean Catch; Future    Other orders  -     nitrofurantoin, macrocrystal-monohydrate, (Macrobid) 100 MG capsule; Take 1 capsule by mouth 2 (Two) Times a Day.  Dispense: 10 capsule; Refill: 0  -     phenazopyridine (PYRIDIUM) 100 MG tablet; Take 1 tablet by mouth 3 (Three) Times a Day As Needed for Bladder Spasms.  Dispense: 6 tablet; Refill: 0      Patient's Body mass index is 27.94 kg/m². indicating that she is overweight (BMI 25-29.9). Patient's (Body mass index is 27.94 kg/m².) indicates that they are overweight with health conditions that include none . Weight is unchanged. BMI is is above average; BMI management  plan is completed. We discussed low calorie, low carb based diet program, portion control and increasing exercise. .    Follow Up   No follow-ups on file.  Patient was given instructions and counseling regarding her condition or for health maintenance advice. Please see specific information pulled into the AVS if appropriate.     This document has been electronically signed by REBECCA Lopes  November 21, 2022 15:59 EST

## 2022-11-23 LAB — BACTERIA SPEC AEROBE CULT: ABNORMAL

## 2022-12-05 ENCOUNTER — APPOINTMENT (OUTPATIENT)
Dept: CT IMAGING | Facility: HOSPITAL | Age: 47
End: 2022-12-05

## 2022-12-05 ENCOUNTER — OFFICE VISIT (OUTPATIENT)
Dept: PSYCHIATRY | Facility: CLINIC | Age: 47
End: 2022-12-05

## 2022-12-05 DIAGNOSIS — F43.12 NIGHTMARES ASSOCIATED WITH CHRONIC POST-TRAUMATIC STRESS DISORDER: ICD-10-CM

## 2022-12-05 DIAGNOSIS — F51.5 NIGHTMARES ASSOCIATED WITH CHRONIC POST-TRAUMATIC STRESS DISORDER: ICD-10-CM

## 2022-12-05 DIAGNOSIS — F40.10 SOCIAL ANXIETY DISORDER: ICD-10-CM

## 2022-12-05 DIAGNOSIS — F41.1 GAD (GENERALIZED ANXIETY DISORDER): Primary | ICD-10-CM

## 2022-12-05 DIAGNOSIS — F41.0 PANIC ATTACKS: ICD-10-CM

## 2022-12-05 PROCEDURE — 90834 PSYTX W PT 45 MINUTES: CPT | Performed by: COUNSELOR

## 2022-12-05 NOTE — PROGRESS NOTES
Date: December 5, 2022  Time In: 12:40pm  Time Out: 1:22pm      PROGRESS NOTE  Data:  Meghan Rivas is a 46 y.o. female who presents today for individual therapy session at [facility]. Patient presents this date for anxiety, social anxiety, depression and panic attacks.  Patient discusses recent panic attacks that have occurred 3 times in the previous week.  She denies any previous incidents with panic attacks but discussed previous anxiety and social anxiety that she has had for several years.  She discusses the increase in depression and anxiety associated with these panic attacks and feelings of hopelessness associated with both.  Patient is very insightful and had previously implemented several coping skills in order to help reduce the frequency and severity of these events including but not limited to breathing techniques.  Patient was open and optimistic to further opportunities and skills in order to reduce symptoms and work towards a state of self maintenance of normalcy.      Clinical Maneuvering/Intervention:    (Scales based on 0 - 10 with 10 being the worst)  Depression: 8 Anxiety: 6       Assisted patient in processing above session content; acknowledged and normalized patient’s thoughts, feelings, and concerns. Rationalized patient thought process regarding recent panic attacks. Discussed triggers associated with patient's anxiety, social anxiety, depression and panic attacks. Also discussed coping skills for patient to implement such as breathing techniques.    Allowed patient to freely discuss issues without interruption or judgment. Provided safe, confidential environment to facilitate the development of positive therapeutic relationship and encourage open, honest communication. Assisted patient in identifying risk factors which would indicate the need for higher level of care including thoughts to harm self or others and/or self-harming behavior and encouraged patient to contact  this office, call 911, or present to the nearest emergency room should any of these events occur. Discussed crisis intervention services and means to access. Patient adamantly and convincingly denies current suicidal or homicidal ideation or perceptual disturbance.    Assessment   Patient appears to maintain relative stability as compared to their baseline. However, patient continues to struggle with anxiety, social anxiety, depression and panic attacks which continues to cause impairment in important areas of functioning. A result, they can be reasonably expected to continue to benefit from treatment and would likely be at increased risk for decompensation otherwise.    Mental Status Exam:   Hygiene:   good  Cooperation:  Cooperative  Eye Contact:  Good  Psychomotor Behavior:  Appropriate  Affect:  Appropriate  Mood: normal  Speech:  Normal  Thought Process:  Goal directed and Linear  Thought Content:  Mood congruent  Suicidal:  None  Homicidal:  None  Hallucinations:  None  Delusion:  None  Memory:  Intact  Orientation:  Person, Place, Time and Situation  Reliability:  good  Insight:  Fair  Judgement:  Fair  Impulse Control:  Fair  Physical/Medical Issues:  Yes thyroid       Patient's Support Network Includes:  significant other    Functional Status: Moderate impairment     Progress toward goal: Not at goal    Prognosis: Fair with Ongoing Treatment          Plan     Patient will continue in individual outpatient therapy with focus on improved functioning and coping skills, maintaining stability, and avoiding decompensation and the need for higher level of care.    Patient will adhere to any medication regimens as prescribed and report any side effects. Patient will contact this office, call 911 or present to the nearest emergency room should suicidal or homicidal ideations occur. Provide cognitive behavioral therapy and solution focused therapy to improve functioning, maintain stability and avoid decompensation  and the need for higher level of care.     Return in about 4 weeks, or earlier if symptoms worsen or fail to improve.           VISIT DIAGNOSIS:     ICD-10-CM ICD-9-CM   1. AMARIS (generalized anxiety disorder)  F41.1 300.02   2. Social anxiety disorder  F40.10 300.23   3. Nightmares associated with chronic post-traumatic stress disorder  F51.5 307.47    F43.12 309.81   4. Panic attacks  F41.0 300.01            This document has been electronically signed by Carmelita Thayer, [facility], [facility]  December 5, 2022 13:41 EST      Part of this note may be an electronic transcription/translation of spoken language to printed text using the Dragon Dictation System.

## 2022-12-12 ENCOUNTER — APPOINTMENT (OUTPATIENT)
Dept: CT IMAGING | Facility: HOSPITAL | Age: 47
End: 2022-12-12

## 2022-12-13 DIAGNOSIS — F43.12 CHRONIC POST-TRAUMATIC STRESS DISORDER (PTSD): ICD-10-CM

## 2022-12-13 DIAGNOSIS — F41.1 GAD (GENERALIZED ANXIETY DISORDER): ICD-10-CM

## 2022-12-13 DIAGNOSIS — F40.10 SOCIAL ANXIETY DISORDER: ICD-10-CM

## 2022-12-13 RX ORDER — LORAZEPAM 0.5 MG/1
0.5 TABLET ORAL DAILY PRN
Qty: 28 TABLET | Refills: 0 | OUTPATIENT
Start: 2022-12-13

## 2022-12-13 RX ORDER — ARIPIPRAZOLE 5 MG/1
5 TABLET ORAL NIGHTLY
Qty: 30 TABLET | Refills: 2 | Status: SHIPPED | OUTPATIENT
Start: 2022-12-13 | End: 2023-03-15

## 2022-12-13 RX ORDER — VENLAFAXINE HYDROCHLORIDE 150 MG/1
150 CAPSULE, EXTENDED RELEASE ORAL DAILY
Qty: 30 CAPSULE | Refills: 2 | Status: SHIPPED | OUTPATIENT
Start: 2022-12-13 | End: 2023-03-15

## 2022-12-16 ENCOUNTER — PRIOR AUTHORIZATION (OUTPATIENT)
Dept: PSYCHIATRY | Facility: CLINIC | Age: 47
End: 2022-12-16

## 2022-12-16 DIAGNOSIS — F41.1 GAD (GENERALIZED ANXIETY DISORDER): ICD-10-CM

## 2022-12-16 DIAGNOSIS — F43.12 CHRONIC POST-TRAUMATIC STRESS DISORDER (PTSD): ICD-10-CM

## 2022-12-16 DIAGNOSIS — F40.10 SOCIAL ANXIETY DISORDER: ICD-10-CM

## 2022-12-16 RX ORDER — LORAZEPAM 0.5 MG/1
0.5 TABLET ORAL DAILY PRN
Qty: 28 TABLET | Refills: 0 | Status: SHIPPED | OUTPATIENT
Start: 2022-12-16 | End: 2023-01-30 | Stop reason: SDUPTHER

## 2023-01-06 ENCOUNTER — APPOINTMENT (OUTPATIENT)
Dept: MAMMOGRAPHY | Facility: HOSPITAL | Age: 48
End: 2023-01-06
Payer: COMMERCIAL

## 2023-01-09 ENCOUNTER — OFFICE VISIT (OUTPATIENT)
Dept: FAMILY MEDICINE CLINIC | Facility: CLINIC | Age: 48
End: 2023-01-09
Payer: COMMERCIAL

## 2023-01-09 VITALS
TEMPERATURE: 98 F | DIASTOLIC BLOOD PRESSURE: 80 MMHG | SYSTOLIC BLOOD PRESSURE: 128 MMHG | BODY MASS INDEX: 28.44 KG/M2 | WEIGHT: 181.2 LBS | OXYGEN SATURATION: 97 % | HEIGHT: 67 IN

## 2023-01-09 DIAGNOSIS — R55 NEAR SYNCOPE: Primary | ICD-10-CM

## 2023-01-09 PROCEDURE — 93000 ELECTROCARDIOGRAM COMPLETE: CPT | Performed by: FAMILY MEDICINE

## 2023-01-09 PROCEDURE — 99214 OFFICE O/P EST MOD 30 MIN: CPT | Performed by: FAMILY MEDICINE

## 2023-01-09 NOTE — PROGRESS NOTES
Chief Complaint  Chest Pain    Subjective          Meghan Rivas presents to [facility]  History of Present Illness  Patient states she has been having episodes that start with some chest pressure then radiate into her arm. States she feels a little disoriented like she might pass out when they happen. States it feels as if her legs go numb and she urinates a little during the episode. States they only last about 20 seconds and they are over and she feels fine. States these have been happening for the past 6 weeks. States the happen randomly. States she does have a history of panic attacks and this does not feel like a panic attack. Denies any loss of consciousness.     Unable to get CT of head scheduled today. Patient states she is unable to get a scan done on any day other than Mondays. States she would like to  wait until next Monday. Educated patient if symptoms worsen she needs to go to ER before then, verbalized understanding.       Review of Systems      Objective   Vital Signs:   /80 (BP Location: Right arm, Patient Position: Sitting, Cuff Size: Adult)   Temp 98 °F (36.7 °C) (Temporal)   Ht 170.2 cm (67\")   Wt 82.2 kg (181 lb 3.2 oz)   SpO2 97%   BMI 28.38 kg/m²     Physical Exam  Constitutional:       General: She is not in acute distress.     Appearance: Normal appearance. She is well-developed and well-groomed. She is not ill-appearing, toxic-appearing or diaphoretic.   HENT:      Head: Normocephalic.      Nose: Nose normal. No congestion or rhinorrhea.      Mouth/Throat:      Mouth: Mucous membranes are moist.      Pharynx: Oropharynx is clear. No oropharyngeal exudate or posterior oropharyngeal erythema.   Eyes:      General: Lids are normal.         Right eye: No discharge.         Left eye: No discharge.      Extraocular Movements: Extraocular movements intact.      Pupils: Pupils are equal, round, and reactive to light.   Neck:      Vascular: No carotid  bruit.   Cardiovascular:      Rate and Rhythm: Normal rate and regular rhythm.      Pulses: Normal pulses.      Heart sounds: Normal heart sounds. No murmur heard.    No friction rub. No gallop.   Pulmonary:      Effort: Pulmonary effort is normal. No respiratory distress.      Breath sounds: Normal breath sounds. No stridor. No wheezing, rhonchi or rales.   Chest:      Chest wall: No tenderness.   Abdominal:      General: Bowel sounds are normal. There is no distension.      Palpations: Abdomen is soft. There is no mass.      Tenderness: There is no abdominal tenderness. There is no right CVA tenderness, left CVA tenderness, guarding or rebound.      Hernia: No hernia is present.   Musculoskeletal:         General: No swelling or tenderness. Normal range of motion.      Cervical back: Normal range of motion and neck supple. No rigidity or tenderness.      Right lower leg: No edema.      Left lower leg: No edema.   Lymphadenopathy:      Cervical: No cervical adenopathy.   Skin:     General: Skin is warm.      Capillary Refill: Capillary refill takes less than 2 seconds.      Coloration: Skin is not jaundiced.      Findings: No bruising, erythema or rash.   Neurological:      General: No focal deficit present.      Mental Status: She is alert and oriented to person, place, and time.      Motor: Motor function is intact. No weakness.      Coordination: Coordination is intact.      Gait: Gait is intact. Gait normal.   Psychiatric:         Attention and Perception: Attention normal.         Mood and Affect: Mood normal.         Speech: Speech normal.         Behavior: Behavior normal.         Cognition and Memory: Cognition normal.         Judgment: Judgment normal.        Result Review :            ECG 12 Lead    Date/Time: 1/9/2023 2:43 PM  Performed by: Selena Gonzalez APRN  Authorized by: Selena Gonzalez APRN   Comparison: compared with previous ECG   Rhythm: sinus rhythm  Rate: normal  Conduction: conduction  normal  Other: no other findings    Clinical impression: normal ECG              Assessment and Plan    Diagnoses and all orders for this visit:    1. Near syncope (Primary)  -     CT Head Without Contrast; Future  -     CBC Auto Differential; Future  -     Comprehensive Metabolic Panel; Future  -     Magnesium; Future  -     TSH; Future  -     T4, Free; Future  -     CBC Auto Differential  -     Comprehensive Metabolic Panel  -     Magnesium  -     TSH  -     T4, Free  -     ECG 12 Lead      Patient's Body mass index is 28.38 kg/m². indicating that she is overweight (BMI 25-29.9). Patient's (Body mass index is 28.38 kg/m².) indicates that they are overweight with health conditions that include GERD . Weight is unchanged. BMI is is above average; BMI management plan is completed. We discussed low calorie, low carb based diet program, portion control and increasing exercise. .    Follow Up   Return labs today, for Next scheduled follow up.  Patient was given instructions and counseling regarding her condition or for health maintenance advice. Please see specific information pulled into the AVS if appropriate.     This document has been electronically signed by REBECCA Lopes  January 9, 2023 14:44 EST

## 2023-01-13 ENCOUNTER — TELEPHONE (OUTPATIENT)
Dept: FAMILY MEDICINE CLINIC | Facility: CLINIC | Age: 48
End: 2023-01-13
Payer: COMMERCIAL

## 2023-01-16 ENCOUNTER — HOSPITAL ENCOUNTER (OUTPATIENT)
Dept: CT IMAGING | Facility: HOSPITAL | Age: 48
Discharge: HOME | End: 2023-01-16
Payer: COMMERCIAL

## 2023-01-16 ENCOUNTER — LAB (OUTPATIENT)
Dept: LAB | Facility: HOSPITAL | Age: 48
End: 2023-01-16
Payer: COMMERCIAL

## 2023-01-16 ENCOUNTER — TELEPHONE (OUTPATIENT)
Dept: FAMILY MEDICINE CLINIC | Facility: CLINIC | Age: 48
End: 2023-01-16
Payer: COMMERCIAL

## 2023-01-16 ENCOUNTER — APPOINTMENT (OUTPATIENT)
Dept: CT IMAGING | Facility: HOSPITAL | Age: 48
End: 2023-01-16
Payer: COMMERCIAL

## 2023-01-16 DIAGNOSIS — R55 NEAR SYNCOPE: ICD-10-CM

## 2023-01-16 DIAGNOSIS — R91.8 MASS OF LUNG: ICD-10-CM

## 2023-01-16 PROCEDURE — 83735 ASSAY OF MAGNESIUM: CPT | Performed by: FAMILY MEDICINE

## 2023-01-16 PROCEDURE — 85025 COMPLETE CBC W/AUTO DIFF WBC: CPT | Performed by: FAMILY MEDICINE

## 2023-01-16 PROCEDURE — 71250 CT THORAX DX C-: CPT

## 2023-01-16 PROCEDURE — 71250 CT THORAX DX C-: CPT | Performed by: RADIOLOGY

## 2023-01-16 PROCEDURE — 70450 CT HEAD/BRAIN W/O DYE: CPT

## 2023-01-16 PROCEDURE — 70450 CT HEAD/BRAIN W/O DYE: CPT | Performed by: RADIOLOGY

## 2023-01-16 PROCEDURE — 84443 ASSAY THYROID STIM HORMONE: CPT | Performed by: FAMILY MEDICINE

## 2023-01-16 PROCEDURE — 84439 ASSAY OF FREE THYROXINE: CPT | Performed by: FAMILY MEDICINE

## 2023-01-16 PROCEDURE — 80053 COMPREHEN METABOLIC PANEL: CPT | Performed by: FAMILY MEDICINE

## 2023-01-16 PROCEDURE — 36415 COLL VENOUS BLD VENIPUNCTURE: CPT | Performed by: FAMILY MEDICINE

## 2023-01-16 NOTE — TELEPHONE ENCOUNTER
----- Message from REBECCA Lopes sent at 1/16/2023  1:26 PM EST -----  Please let patient know results are normal. Thank you.         Patient notified.

## 2023-01-17 ENCOUNTER — TELEPHONE (OUTPATIENT)
Dept: FAMILY MEDICINE CLINIC | Facility: CLINIC | Age: 48
End: 2023-01-17
Payer: COMMERCIAL

## 2023-01-17 LAB
ALBUMIN SERPL-MCNC: 4.4 G/DL (ref 3.5–5.2)
ALBUMIN/GLOB SERPL: 1.4 G/DL
ALP SERPL-CCNC: 53 U/L (ref 39–117)
ALT SERPL W P-5'-P-CCNC: 9 U/L (ref 1–33)
ANION GAP SERPL CALCULATED.3IONS-SCNC: 8 MMOL/L (ref 5–15)
AST SERPL-CCNC: 25 U/L (ref 1–32)
BASOPHILS # BLD AUTO: 0.06 10*3/MM3 (ref 0–0.2)
BASOPHILS NFR BLD AUTO: 1.2 % (ref 0–1.5)
BILIRUB SERPL-MCNC: 0.4 MG/DL (ref 0–1.2)
BUN SERPL-MCNC: 11 MG/DL (ref 6–20)
BUN/CREAT SERPL: 18.6 (ref 7–25)
CALCIUM SPEC-SCNC: 9.3 MG/DL (ref 8.6–10.5)
CHLORIDE SERPL-SCNC: 105 MMOL/L (ref 98–107)
CO2 SERPL-SCNC: 25 MMOL/L (ref 22–29)
CREAT SERPL-MCNC: 0.59 MG/DL (ref 0.57–1)
DEPRECATED RDW RBC AUTO: 45.7 FL (ref 37–54)
EGFRCR SERPLBLD CKD-EPI 2021: 112 ML/MIN/1.73
EOSINOPHIL # BLD AUTO: 0.35 10*3/MM3 (ref 0–0.4)
EOSINOPHIL NFR BLD AUTO: 7 % (ref 0.3–6.2)
ERYTHROCYTE [DISTWIDTH] IN BLOOD BY AUTOMATED COUNT: 14.3 % (ref 12.3–15.4)
GLOBULIN UR ELPH-MCNC: 3.2 GM/DL
GLUCOSE SERPL-MCNC: 96 MG/DL (ref 65–99)
HCT VFR BLD AUTO: 39 % (ref 34–46.6)
HGB BLD-MCNC: 13 G/DL (ref 12–15.9)
IMM GRANULOCYTES # BLD AUTO: 0.01 10*3/MM3 (ref 0–0.05)
IMM GRANULOCYTES NFR BLD AUTO: 0.2 % (ref 0–0.5)
LYMPHOCYTES # BLD AUTO: 2.23 10*3/MM3 (ref 0.7–3.1)
LYMPHOCYTES NFR BLD AUTO: 44.9 % (ref 19.6–45.3)
MAGNESIUM SERPL-MCNC: 1.9 MG/DL (ref 1.6–2.6)
MCH RBC QN AUTO: 29.3 PG (ref 26.6–33)
MCHC RBC AUTO-ENTMCNC: 33.3 G/DL (ref 31.5–35.7)
MCV RBC AUTO: 87.8 FL (ref 79–97)
MONOCYTES # BLD AUTO: 0.48 10*3/MM3 (ref 0.1–0.9)
MONOCYTES NFR BLD AUTO: 9.7 % (ref 5–12)
NEUTROPHILS NFR BLD AUTO: 1.84 10*3/MM3 (ref 1.7–7)
NEUTROPHILS NFR BLD AUTO: 37 % (ref 42.7–76)
NRBC BLD AUTO-RTO: 0 /100 WBC (ref 0–0.2)
PLATELET # BLD AUTO: 245 10*3/MM3 (ref 140–450)
PMV BLD AUTO: 10.8 FL (ref 6–12)
POTASSIUM SERPL-SCNC: 4.1 MMOL/L (ref 3.5–5.2)
PROT SERPL-MCNC: 7.6 G/DL (ref 6–8.5)
RBC # BLD AUTO: 4.44 10*6/MM3 (ref 3.77–5.28)
SODIUM SERPL-SCNC: 138 MMOL/L (ref 136–145)
T4 FREE SERPL-MCNC: 0.96 NG/DL (ref 0.93–1.7)
TSH SERPL DL<=0.05 MIU/L-ACNC: 4.31 UIU/ML (ref 0.27–4.2)
WBC NRBC COR # BLD: 4.97 10*3/MM3 (ref 3.4–10.8)

## 2023-01-17 RX ORDER — LEVOTHYROXINE SODIUM 0.07 MG/1
75 TABLET ORAL DAILY
Qty: 30 TABLET | Refills: 2 | Status: SHIPPED | OUTPATIENT
Start: 2023-01-17

## 2023-01-17 NOTE — TELEPHONE ENCOUNTER
----- Message from REBECCA Lopes sent at 1/17/2023  8:35 AM EST -----  Please call the patient regarding her abnormal result. Is she still taking synthroid? It isn't on her medication list however it looks like in the past she has been taking, if not she needs to restart synthroid 50mcg. The rest of her labs are stable.     Spoke with patient & she verbalized understanding,reports she does take Synthroid 50 mcg every day.(It is listed on her medication list)

## 2023-01-17 NOTE — TELEPHONE ENCOUNTER
Im sorry I looked over it twice, I am going to increase to 75mcg.       Mail box is full at this time.    Mail box is still full.    Letter mailed.

## 2023-01-30 ENCOUNTER — OFFICE VISIT (OUTPATIENT)
Dept: PSYCHIATRY | Facility: CLINIC | Age: 48
End: 2023-01-30
Payer: COMMERCIAL

## 2023-01-30 VITALS
BODY MASS INDEX: 28.6 KG/M2 | HEIGHT: 67 IN | WEIGHT: 182.2 LBS | HEART RATE: 74 BPM | SYSTOLIC BLOOD PRESSURE: 122 MMHG | DIASTOLIC BLOOD PRESSURE: 84 MMHG

## 2023-01-30 DIAGNOSIS — F17.200 NICOTINE USE DISORDER: ICD-10-CM

## 2023-01-30 DIAGNOSIS — F41.1 GAD (GENERALIZED ANXIETY DISORDER): ICD-10-CM

## 2023-01-30 DIAGNOSIS — F12.10 CANNABIS ABUSE, EPISODIC: ICD-10-CM

## 2023-01-30 DIAGNOSIS — F51.5 NIGHTMARES ASSOCIATED WITH CHRONIC POST-TRAUMATIC STRESS DISORDER: ICD-10-CM

## 2023-01-30 DIAGNOSIS — F40.10 SOCIAL ANXIETY DISORDER: ICD-10-CM

## 2023-01-30 DIAGNOSIS — F41.0 PANIC ATTACKS: ICD-10-CM

## 2023-01-30 DIAGNOSIS — F43.12 CHRONIC POST-TRAUMATIC STRESS DISORDER (PTSD): Primary | ICD-10-CM

## 2023-01-30 DIAGNOSIS — F43.12 NIGHTMARES ASSOCIATED WITH CHRONIC POST-TRAUMATIC STRESS DISORDER: ICD-10-CM

## 2023-01-30 PROCEDURE — 99214 OFFICE O/P EST MOD 30 MIN: CPT | Performed by: PSYCHIATRY & NEUROLOGY

## 2023-01-30 RX ORDER — PROPRANOLOL HYDROCHLORIDE 20 MG/1
TABLET ORAL
Qty: 60 TABLET | Refills: 1 | Status: SHIPPED | OUTPATIENT
Start: 2023-01-30

## 2023-01-30 RX ORDER — LORAZEPAM 0.5 MG/1
0.5 TABLET ORAL DAILY PRN
Qty: 30 TABLET | Refills: 1 | Status: SHIPPED | OUTPATIENT
Start: 2023-01-30

## 2023-01-30 RX ORDER — PRAZOSIN HYDROCHLORIDE 5 MG/1
5 CAPSULE ORAL NIGHTLY
Qty: 30 CAPSULE | Refills: 2 | Status: SHIPPED | OUTPATIENT
Start: 2023-01-30

## 2023-01-30 NOTE — PROGRESS NOTES
Subjective   Meghan Rivas is a 47 y.o. female who presents today for follow up    Chief Complaint: History of bipolar disorder, PTSD, substance abuse, depression, anxiety    History of Present Illness: Patient presented today for follow-up.  She reports that since last visit, things have been relatively stable.  She reports her mood and anxiety symptoms are more appropriately controlled and she is doing better overall than she was when she initiated treatment.  She still has a good days and bad days but overall tends to have more good days and is able to cope with bad days more appropriately.  She does endorse that recently she has been more forgetful and if she does not write things down, she will forget it relatively quickly.  She states that she is having some increased stress.  Her father-in-law was diagnosed with throat cancer and the family has been helping out with him and his wife which has led to some dysphoria and stress related to remembering her father when he had cancer.  Her primary complaint today is worsening nightmares causing anxiety and stress and poor sleep with fatigue during the day.  She states that she has significant nightmares multiple times at night and she can usually go to bed the first few times that she has them but after the third nightmare, she tends to wake up for the rest of the night and cannot go back to sleep despite it being a long time before she has to get back to sleep.  We are previously on prazosin at 2 mg and it was not overly helpful and nightmares tended to improve and be manageable on their own.  We will retry this medicine at higher dosing.  Advised patient not to take propranolol near the time that she takes prazosin as the additive effect could cause lightheadedness, dizziness, and potential syncope due to drop in blood pressure or heart rate.  Patient voiced understanding.  She denies SI/HI/AVH.    The following portions of the patient's history were reviewed and  updated as appropriate: allergies, current medications, past family history, past medical history, past social history, past surgical history and problem list.      Past Medical History:  Past Medical History:   Diagnosis Date   • Anxiety    • Depression    • Disease of thyroid gland    • GERD (gastroesophageal reflux disease)    • Hepatitis C    • History of drug abuse (HCC)    • Hypertension    • Seizures (HCC)     LAST SEIZURE 2019       Social History:  Social History     Socioeconomic History   • Marital status:    Tobacco Use   • Smoking status: Every Day     Packs/day: 0.25     Years: 3.50     Pack years: 0.88     Types: Cigarettes   • Smokeless tobacco: Never   Vaping Use   • Vaping Use: Former   Substance and Sexual Activity   • Alcohol use: Not Currently     Comment: sober 4 years   • Drug use: Not Currently     Types: IV, Heroin, Methamphetamines     Comment: clean 5 years   • Sexual activity: Yes     Partners: Male       Family History:  Family History   Adopted: Yes   Problem Relation Age of Onset   • No Known Problems Adoptive Father    • Behavior problems Adoptive Mother        Past Surgical History:  Past Surgical History:   Procedure Laterality Date   • APPENDECTOMY N/A 3/8/2022    Procedure: APPENDECTOMY LAPAROSCOPIC;  Surgeon: Babar Perkins MD;  Location: [facility];  Service: General;  Laterality: N/A;   • COLONOSCOPY N/A 10/5/2022    Procedure: COLONOSCOPY;  Surgeon: Neris Bear MD;  Location: [facility];  Service: Gastroenterology;  Laterality: N/A;   • OVARIAN CYST SURGERY      x7 surgeries       Problem List:  Patient Active Problem List   Diagnosis   • Chronic hepatitis C without hepatic coma (HCC)   • MVP (mitral valve prolapse)   • Hx of drug abuse (HCC)   • Leg neuralgia, right   • Anxiety and depression   • Gastroesophageal reflux disease   • Syncope   • Acute appendicitis   • Chronic idiopathic constipation       Allergy:   Allergies   Allergen Reactions   •  Contrast Dye (Echo Or Unknown Ct/Mr) Anaphylaxis     Tolerated IV contrast CT 3/7/2022        Current Medications:   Current Outpatient Medications   Medication Sig Dispense Refill   • albuterol sulfate  (90 Base) MCG/ACT inhaler Inhale 2 puffs Every 6 (Six) Hours As Needed for Wheezing. 6.7 g 2   • ARIPiprazole (ABILIFY) 5 MG tablet Take 1 tablet by mouth Every Night. 30 tablet 2   • docusate sodium (COLACE) 100 MG capsule Take 1 capsule by mouth 2 (Two) Times a Day. 60 capsule 2   • ibuprofen (ADVIL,MOTRIN) 600 MG tablet Take 1 tablet by mouth Every 6 (Six) Hours As Needed for Mild Pain . 20 tablet 0   • levothyroxine (Synthroid) 75 MCG tablet Take 1 tablet by mouth Daily. 30 tablet 2   • LORazepam (Ativan) 0.5 MG tablet Take 1 tablet by mouth Daily As Needed for Anxiety. 28 tablet 0   • omeprazole (PrilOSEC) 40 MG capsule Take 1 capsule by mouth Daily. 30 capsule 2   • propranolol (INDERAL) 20 MG tablet Take 0.5 to 1 tablet PO up to twice daily as needed for anxiety. 60 tablet 1   • venlafaxine XR (EFFEXOR-XR) 150 MG 24 hr capsule Take 1 capsule by mouth Daily. 30 capsule 2     No current facility-administered medications for this visit.       Review of Symptoms:    Review of Systems   Constitutional: Positive for fatigue. Negative for activity change.   HENT: Negative for congestion and rhinorrhea.    Eyes: Negative for blurred vision and visual disturbance.   Respiratory: Negative for cough and shortness of breath.    Cardiovascular: Negative for chest pain and palpitations.   Gastrointestinal: Negative for nausea and vomiting.   Endocrine: Negative for cold intolerance and heat intolerance.   Genitourinary: Negative for dysuria and frequency.   Musculoskeletal: Negative for arthralgias and myalgias.   Skin: Negative for rash and wound.   Allergic/Immunologic: Negative for environmental allergies and food allergies.   Neurological: Negative for weakness and confusion.   Hematological: Negative for  "adenopathy. Does not bruise/bleed easily.   Psychiatric/Behavioral: Positive for sleep disturbance. Negative for agitation, decreased concentration, dysphoric mood, suicidal ideas, depressed mood and stress. The patient is nervous/anxious.          Physical Exam:   Blood pressure 122/84, pulse 74, height 170.2 cm (67\"), weight 82.6 kg (182 lb 3.2 oz), not currently breastfeeding.    Appearance: CF of stated age, NAD   Gait, Station, Strength: WNL    Mental Status Exam:   Hygiene:   good  Cooperation:  Cooperative  Eye Contact:  Good  Psychomotor Behavior:  Appropriate  Affect:  Full range  Mood: anxious and fluctates-overall continues to be improved  Hopelessness: Optimistic  Speech:  Normal  Thought Process:  Goal directed and Linear  Thought Content:  Normal and Mood congruent  Suicidal:  None, resolved  Homicidal:  None  Hallucinations:  None  Delusion:  None  Memory:  Intact  Orientation:  Person, Place, Time and Situation  Reliability:  good  Insight:  Fair  Judgement:  Good  Impulse Control:  Good      Lab Results:   Office Visit on 01/09/2023   Component Date Value Ref Range Status   • WBC 01/16/2023 4.97  3.40 - 10.80 10*3/mm3 Final   • RBC 01/16/2023 4.44  3.77 - 5.28 10*6/mm3 Final   • Hemoglobin 01/16/2023 13.0  12.0 - 15.9 g/dL Final   • Hematocrit 01/16/2023 39.0  34.0 - 46.6 % Final   • MCV 01/16/2023 87.8  79.0 - 97.0 fL Final   • MCH 01/16/2023 29.3  26.6 - 33.0 pg Final   • MCHC 01/16/2023 33.3  31.5 - 35.7 g/dL Final   • RDW 01/16/2023 14.3  12.3 - 15.4 % Final   • RDW-SD 01/16/2023 45.7  37.0 - 54.0 fl Final   • MPV 01/16/2023 10.8  6.0 - 12.0 fL Final   • Platelets 01/16/2023 245  140 - 450 10*3/mm3 Final   • Neutrophil % 01/16/2023 37.0 (L)  42.7 - 76.0 % Final   • Lymphocyte % 01/16/2023 44.9  19.6 - 45.3 % Final   • Monocyte % 01/16/2023 9.7  5.0 - 12.0 % Final   • Eosinophil % 01/16/2023 7.0 (H)  0.3 - 6.2 % Final   • Basophil % 01/16/2023 1.2  0.0 - 1.5 % Final   • Immature Grans % " 01/16/2023 0.2  0.0 - 0.5 % Final   • Neutrophils, Absolute 01/16/2023 1.84  1.70 - 7.00 10*3/mm3 Final   • Lymphocytes, Absolute 01/16/2023 2.23  0.70 - 3.10 10*3/mm3 Final   • Monocytes, Absolute 01/16/2023 0.48  0.10 - 0.90 10*3/mm3 Final   • Eosinophils, Absolute 01/16/2023 0.35  0.00 - 0.40 10*3/mm3 Final   • Basophils, Absolute 01/16/2023 0.06  0.00 - 0.20 10*3/mm3 Final   • Immature Grans, Absolute 01/16/2023 0.01  0.00 - 0.05 10*3/mm3 Final   • nRBC 01/16/2023 0.0  0.0 - 0.2 /100 WBC Final   • Glucose 01/16/2023 96  65 - 99 mg/dL Final   • BUN 01/16/2023 11  6 - 20 mg/dL Final   • Creatinine 01/16/2023 0.59  0.57 - 1.00 mg/dL Final   • Sodium 01/16/2023 138  136 - 145 mmol/L Final   • Potassium 01/16/2023 4.1  3.5 - 5.2 mmol/L Final   • Chloride 01/16/2023 105  98 - 107 mmol/L Final   • CO2 01/16/2023 25.0  22.0 - 29.0 mmol/L Final   • Calcium 01/16/2023 9.3  8.6 - 10.5 mg/dL Final   • Total Protein 01/16/2023 7.6  6.0 - 8.5 g/dL Final   • Albumin 01/16/2023 4.4  3.5 - 5.2 g/dL Final   • ALT (SGPT) 01/16/2023 9  1 - 33 U/L Final   • AST (SGOT) 01/16/2023 25  1 - 32 U/L Final   • Alkaline Phosphatase 01/16/2023 53  39 - 117 U/L Final   • Total Bilirubin 01/16/2023 0.4  0.0 - 1.2 mg/dL Final   • Globulin 01/16/2023 3.2  gm/dL Final   • A/G Ratio 01/16/2023 1.4  g/dL Final   • BUN/Creatinine Ratio 01/16/2023 18.6  7.0 - 25.0 Final   • Anion Gap 01/16/2023 8.0  5.0 - 15.0 mmol/L Final   • eGFR 01/16/2023 112.0  >60.0 mL/min/1.73 Final    National Kidney Foundation and American Society of Nephrology (ASN) Task Force recommended calculation based on the Chronic Kidney Disease Epidemiology Collaboration (CKD-EPI) equation refit without adjustment for race.   • Magnesium 01/16/2023 1.9  1.6 - 2.6 mg/dL Final   • TSH 01/16/2023 4.310 (H)  0.270 - 4.200 uIU/mL Final   • Free T4 01/16/2023 0.96  0.93 - 1.70 ng/dL Final       Assessment & Plan   Diagnoses and all orders for this visit:    1. Chronic post-traumatic  stress disorder (PTSD) (Primary)  -     LORazepam (Ativan) 0.5 MG tablet; Take 1 tablet by mouth Daily As Needed for Anxiety.  Dispense: 30 tablet; Refill: 1    2. AMARIS (generalized anxiety disorder)  -     LORazepam (Ativan) 0.5 MG tablet; Take 1 tablet by mouth Daily As Needed for Anxiety.  Dispense: 30 tablet; Refill: 1  -     propranolol (INDERAL) 20 MG tablet; Take 0.5 to 1 tablet PO up to twice daily as needed for anxiety.  Dispense: 60 tablet; Refill: 1    3. Social anxiety disorder  -     LORazepam (Ativan) 0.5 MG tablet; Take 1 tablet by mouth Daily As Needed for Anxiety.  Dispense: 30 tablet; Refill: 1  -     propranolol (INDERAL) 20 MG tablet; Take 0.5 to 1 tablet PO up to twice daily as needed for anxiety.  Dispense: 60 tablet; Refill: 1    4. Panic attacks  -     LORazepam (Ativan) 0.5 MG tablet; Take 1 tablet by mouth Daily As Needed for Anxiety.  Dispense: 30 tablet; Refill: 1    5. Nightmares associated with chronic post-traumatic stress disorder  -     prazosin (MINIPRESS) 5 MG capsule; Take 1 capsule by mouth Every Night.  Dispense: 30 capsule; Refill: 2    6. Nicotine use disorder    7. Cannabis abuse, episodic    -Patient maintaining appropriately but is having some increased nightmares leading to fatigue, dysphoria, and increase stress during the day.  We elected to retry prazosin at higher dosing and patient was advised to not take propranolol near the time that she takes prazosin due to the possible additive effect leading to hypotension, bradycardia, or syncope.  -Reviewed previous available documentation  -Reviewed most recent available labs   -NALINI reviewed and appropriate. Patient counseled on use of controlled substances.   -Continue Abilify 5 mg p.o. nightly for mood stabilization and anxiety  -Continue Effexor  mg p.o. daily for mood and anxiety  -Continue propranolol 10 to 20 mg p.o. as needed 30 minutes to an hour prior to anxiety provoking event for situational and social  anxiety  -Restart prazosin at 5 mg p.o. nightly for nightmares related to PTSD  -Continue Ativan 0.5 mg p.o. daily as needed for anxiety.  Advised patient to only take the medication as needed in times of crisis  -Encourage cessation of nicotine  -Encourage cessation of cannabis  -Encouraged continued cessation of methamphetamine  -Encouraged therapy      Visit Diagnoses:    ICD-10-CM ICD-9-CM   1. Chronic post-traumatic stress disorder (PTSD)  F43.12 309.81   2. AMARIS (generalized anxiety disorder)  F41.1 300.02   3. Social anxiety disorder  F40.10 300.23   4. Panic attacks  F41.0 300.01   5. Nightmares associated with chronic post-traumatic stress disorder  F51.5 307.47    F43.12 309.81   6. Nicotine use disorder  F17.200 305.1   7. Cannabis abuse, episodic  F12.10 305.22       TREATMENT PLAN/GOALS: Continue supportive psychotherapy efforts and medications as indicated. Treatment and medication options discussed during today's visit. Patient acknowledged and verbally consented to continue with current treatment plan and was educated on the importance of compliance with treatment and follow-up appointments.    MEDICATION ISSUES:    Discussed medication options and treatment plan of prescribed medication as well as the risks, benefits, and side effects including potential falls, possible impaired driving and metabolic adversities among others. Patient is agreeable to call the office with any worsening of symptoms or onset of side effects. Patient is agreeable to call 911 or go to the nearest ER should he/she begin having SI/HI.     MEDS ORDERED DURING VISIT:  New Medications Ordered This Visit   Medications   • prazosin (MINIPRESS) 5 MG capsule     Sig: Take 1 capsule by mouth Every Night.     Dispense:  30 capsule     Refill:  2   • LORazepam (Ativan) 0.5 MG tablet     Sig: Take 1 tablet by mouth Daily As Needed for Anxiety.     Dispense:  30 tablet     Refill:  1   • propranolol (INDERAL) 20 MG tablet     Sig: Take  0.5 to 1 tablet PO up to twice daily as needed for anxiety.     Dispense:  60 tablet     Refill:  1       Return in about 3 months (around 4/30/2023).             This document has been electronically signed by Giacomo Phipps MD  January 30, 2023 10:54 EST    Dictated using Dragon Dictation.

## 2023-03-10 ENCOUNTER — HOSPITAL ENCOUNTER (EMERGENCY)
Facility: HOSPITAL | Age: 48
Discharge: HOME | End: 2023-03-10
Attending: EMERGENCY MEDICINE | Admitting: EMERGENCY MEDICINE
Payer: COMMERCIAL

## 2023-03-10 VITALS
SYSTOLIC BLOOD PRESSURE: 130 MMHG | RESPIRATION RATE: 18 BRPM | TEMPERATURE: 98.2 F | OXYGEN SATURATION: 99 % | HEIGHT: 67 IN | BODY MASS INDEX: 26.68 KG/M2 | DIASTOLIC BLOOD PRESSURE: 68 MMHG | WEIGHT: 170 LBS | HEART RATE: 80 BPM

## 2023-03-10 DIAGNOSIS — J10.1 INFLUENZA A: Primary | ICD-10-CM

## 2023-03-10 LAB
ALBUMIN SERPL-MCNC: 4.1 G/DL (ref 3.5–5.2)
ALBUMIN/GLOB SERPL: 1 G/DL
ALP SERPL-CCNC: 45 U/L (ref 39–117)
ALT SERPL W P-5'-P-CCNC: 5 U/L (ref 1–33)
ANION GAP SERPL CALCULATED.3IONS-SCNC: 11.8 MMOL/L (ref 5–15)
AST SERPL-CCNC: 26 U/L (ref 1–32)
BASOPHILS # BLD AUTO: 0.02 10*3/MM3 (ref 0–0.2)
BASOPHILS NFR BLD AUTO: 0.4 % (ref 0–1.5)
BILIRUB SERPL-MCNC: 0.2 MG/DL (ref 0–1.2)
BILIRUB UR QL STRIP: NEGATIVE
BUN SERPL-MCNC: 7 MG/DL (ref 6–20)
BUN/CREAT SERPL: 12.7 (ref 7–25)
CALCIUM SPEC-SCNC: 9.5 MG/DL (ref 8.6–10.5)
CHLORIDE SERPL-SCNC: 100 MMOL/L (ref 98–107)
CLARITY UR: CLEAR
CO2 SERPL-SCNC: 22.2 MMOL/L (ref 22–29)
COLOR UR: YELLOW
CREAT SERPL-MCNC: 0.55 MG/DL (ref 0.57–1)
DEPRECATED RDW RBC AUTO: 47.6 FL (ref 37–54)
EGFRCR SERPLBLD CKD-EPI 2021: 113.9 ML/MIN/1.73
EOSINOPHIL # BLD AUTO: 0.06 10*3/MM3 (ref 0–0.4)
EOSINOPHIL NFR BLD AUTO: 1.1 % (ref 0.3–6.2)
ERYTHROCYTE [DISTWIDTH] IN BLOOD BY AUTOMATED COUNT: 14.1 % (ref 12.3–15.4)
FLUAV RNA RESP QL NAA+PROBE: DETECTED
FLUBV RNA RESP QL NAA+PROBE: NOT DETECTED
GLOBULIN UR ELPH-MCNC: 4 GM/DL
GLUCOSE SERPL-MCNC: 82 MG/DL (ref 65–99)
GLUCOSE UR STRIP-MCNC: NEGATIVE MG/DL
HCT VFR BLD AUTO: 38.4 % (ref 34–46.6)
HGB BLD-MCNC: 12.5 G/DL (ref 12–15.9)
HGB UR QL STRIP.AUTO: NEGATIVE
HOLD SPECIMEN: NORMAL
HOLD SPECIMEN: NORMAL
IMM GRANULOCYTES # BLD AUTO: 0.01 10*3/MM3 (ref 0–0.05)
IMM GRANULOCYTES NFR BLD AUTO: 0.2 % (ref 0–0.5)
KETONES UR QL STRIP: ABNORMAL
LEUKOCYTE ESTERASE UR QL STRIP.AUTO: NEGATIVE
LYMPHOCYTES # BLD AUTO: 1.36 10*3/MM3 (ref 0.7–3.1)
LYMPHOCYTES NFR BLD AUTO: 25.5 % (ref 19.6–45.3)
MCH RBC QN AUTO: 29.8 PG (ref 26.6–33)
MCHC RBC AUTO-ENTMCNC: 32.6 G/DL (ref 31.5–35.7)
MCV RBC AUTO: 91.6 FL (ref 79–97)
MONOCYTES # BLD AUTO: 0.71 10*3/MM3 (ref 0.1–0.9)
MONOCYTES NFR BLD AUTO: 13.3 % (ref 5–12)
NEUTROPHILS NFR BLD AUTO: 3.18 10*3/MM3 (ref 1.7–7)
NEUTROPHILS NFR BLD AUTO: 59.5 % (ref 42.7–76)
NITRITE UR QL STRIP: NEGATIVE
NRBC BLD AUTO-RTO: 0 /100 WBC (ref 0–0.2)
PH UR STRIP.AUTO: 7.5 [PH] (ref 5–8)
PLATELET # BLD AUTO: 146 10*3/MM3 (ref 140–450)
PMV BLD AUTO: 9.2 FL (ref 6–12)
POTASSIUM SERPL-SCNC: 3.8 MMOL/L (ref 3.5–5.2)
PROT SERPL-MCNC: 8.1 G/DL (ref 6–8.5)
PROT UR QL STRIP: NEGATIVE
RBC # BLD AUTO: 4.19 10*6/MM3 (ref 3.77–5.28)
SARS-COV-2 RNA RESP QL NAA+PROBE: NOT DETECTED
SODIUM SERPL-SCNC: 134 MMOL/L (ref 136–145)
SP GR UR STRIP: 1.02 (ref 1–1.03)
UROBILINOGEN UR QL STRIP: ABNORMAL
WBC NRBC COR # BLD: 5.34 10*3/MM3 (ref 3.4–10.8)
WHOLE BLOOD HOLD COAG: NORMAL
WHOLE BLOOD HOLD SPECIMEN: NORMAL

## 2023-03-10 PROCEDURE — 99284 EMERGENCY DEPT VISIT MOD MDM: CPT

## 2023-03-10 PROCEDURE — C9803 HOPD COVID-19 SPEC COLLECT: HCPCS

## 2023-03-10 PROCEDURE — 81003 URINALYSIS AUTO W/O SCOPE: CPT | Performed by: PHYSICIAN ASSISTANT

## 2023-03-10 PROCEDURE — 80053 COMPREHEN METABOLIC PANEL: CPT | Performed by: PHYSICIAN ASSISTANT

## 2023-03-10 PROCEDURE — 36415 COLL VENOUS BLD VENIPUNCTURE: CPT

## 2023-03-10 PROCEDURE — 87636 SARSCOV2 & INF A&B AMP PRB: CPT | Performed by: PHYSICIAN ASSISTANT

## 2023-03-10 PROCEDURE — 85025 COMPLETE CBC W/AUTO DIFF WBC: CPT | Performed by: PHYSICIAN ASSISTANT

## 2023-03-10 RX ORDER — ACETAMINOPHEN 500 MG
1000 TABLET ORAL ONCE
Status: COMPLETED | OUTPATIENT
Start: 2023-03-10 | End: 2023-03-10

## 2023-03-10 RX ORDER — OSELTAMIVIR PHOSPHATE 75 MG/1
75 CAPSULE ORAL 2 TIMES DAILY
Qty: 10 CAPSULE | Refills: 0 | Status: SHIPPED | OUTPATIENT
Start: 2023-03-10 | End: 2023-03-15

## 2023-03-10 RX ADMIN — ACETAMINOPHEN 1000 MG: 500 TABLET ORAL at 18:07

## 2023-03-10 NOTE — ED NOTES
MEDICAL SCREENING:    Reason for Visit: Patient with congestion, burning with urination, body aches, cough.  Patient initially seen in triage.  The patient was advised further evaluation and diagnostic testing will be needed, some of the treatment and testing will be initiated in the lobby in order to begin the process.  The patient will be returned to the waiting area for the time being and possibly be re-assessed by a subsequent ED provider.  The patient will be brought back to the treatment area in as timely manner as possible.         Lul Arauz PA  03/10/23 1725

## 2023-03-10 NOTE — ED PROVIDER NOTES
Subjective   History of Present Illness  48 yo female patient with hx of anxiety, depression, Thyroid disease, GERD, hep C, HTN, and seizures presents to the ED for cough, congestion, headaches, body aches, and dysuria.  Pt states at times she has gotten dizzy. Patient states her symptoms started today. Patient states that she has been exposed to her sick . Patient denies any worsening or alleviating factors.      History provided by:  Patient   used: No        Review of Systems   Constitutional: Positive for fatigue.   HENT: Positive for congestion.    Eyes: Negative.    Respiratory: Positive for cough.    Cardiovascular: Negative.    Gastrointestinal: Negative.    Endocrine: Negative.    Genitourinary: Negative.    Musculoskeletal: Negative.    Skin: Negative.    Allergic/Immunologic: Negative.    Neurological: Positive for dizziness and headaches.   Hematological: Negative.    Psychiatric/Behavioral: Negative.    All other systems reviewed and are negative.      Past Medical History:   Diagnosis Date   • Anxiety    • Depression    • Disease of thyroid gland    • GERD (gastroesophageal reflux disease)    • Hepatitis C    • History of drug abuse (HCC)    • Hypertension    • Seizures (HCC)     LAST SEIZURE 2019       Allergies   Allergen Reactions   • Contrast Dye (Echo Or Unknown Ct/Mr) Anaphylaxis     Tolerated IV contrast CT 3/7/2022       Past Surgical History:   Procedure Laterality Date   • APPENDECTOMY N/A 3/8/2022    Procedure: APPENDECTOMY LAPAROSCOPIC;  Surgeon: Babar Perkins MD;  Location: [facility];  Service: General;  Laterality: N/A;   • COLONOSCOPY N/A 10/5/2022    Procedure: COLONOSCOPY;  Surgeon: Neris Bear MD;  Location: [facility] OR;  Service: Gastroenterology;  Laterality: N/A;   • OVARIAN CYST SURGERY      x7 surgeries       Family History   Adopted: Yes   Problem Relation Age of Onset   • No Known Problems Adoptive Father    • Behavior problems  Adoptive Mother        Social History     Socioeconomic History   • Marital status:    Tobacco Use   • Smoking status: Every Day     Packs/day: 0.25     Years: 3.50     Pack years: 0.88     Types: Cigarettes   • Smokeless tobacco: Never   Vaping Use   • Vaping Use: Former   Substance and Sexual Activity   • Alcohol use: Not Currently     Comment: sober 4 years   • Drug use: Not Currently     Types: IV, Heroin, Methamphetamines     Comment: clean 5 years   • Sexual activity: Yes     Partners: Male           Objective   Physical Exam  Vitals and nursing note reviewed.   Constitutional:       Appearance: Normal appearance. She is normal weight.   HENT:      Head: Normocephalic and atraumatic.      Right Ear: Tympanic membrane, ear canal and external ear normal.      Left Ear: Tympanic membrane, ear canal and external ear normal.      Nose: Congestion present.      Mouth/Throat:      Mouth: Mucous membranes are moist.      Pharynx: Oropharynx is clear.   Eyes:      Extraocular Movements: Extraocular movements intact.      Conjunctiva/sclera: Conjunctivae normal.      Pupils: Pupils are equal, round, and reactive to light.   Cardiovascular:      Rate and Rhythm: Normal rate and regular rhythm.      Pulses: Normal pulses.      Heart sounds: Normal heart sounds.   Pulmonary:      Effort: Pulmonary effort is normal.      Breath sounds: Normal breath sounds.   Abdominal:      General: Abdomen is flat. Bowel sounds are normal.      Palpations: Abdomen is soft.   Musculoskeletal:         General: Normal range of motion.      Cervical back: Normal range of motion and neck supple.   Skin:     General: Skin is warm and dry.      Capillary Refill: Capillary refill takes less than 2 seconds.   Neurological:      General: No focal deficit present.      Mental Status: She is alert and oriented to person, place, and time. Mental status is at baseline.   Psychiatric:         Mood and Affect: Mood normal.         Behavior:  Behavior normal.         Thought Content: Thought content normal.         Judgment: Judgment normal.         Procedures           ED Course  ED Course as of 03/10/23 1904   Fri Mar 10, 2023   1823 Influenza A PCR(!): Detected [ML]      ED Course User Index  [ML] Linda Swain PA                                        No radiology results for the last day  Results for orders placed or performed during the hospital encounter of 03/10/23   COVID-19 and FLU A/B PCR - Swab, Nasopharynx    Specimen: Nasopharynx; Swab   Result Value Ref Range    COVID19 Not Detected Not Detected - Ref. Range    Influenza A PCR Detected (A) Not Detected    Influenza B PCR Not Detected Not Detected   Comprehensive Metabolic Panel    Specimen: Arm, Left; Blood   Result Value Ref Range    Glucose 82 65 - 99 mg/dL    BUN 7 6 - 20 mg/dL    Creatinine 0.55 (L) 0.57 - 1.00 mg/dL    Sodium 134 (L) 136 - 145 mmol/L    Potassium 3.8 3.5 - 5.2 mmol/L    Chloride 100 98 - 107 mmol/L    CO2 22.2 22.0 - 29.0 mmol/L    Calcium 9.5 8.6 - 10.5 mg/dL    Total Protein 8.1 6.0 - 8.5 g/dL    Albumin 4.1 3.5 - 5.2 g/dL    ALT (SGPT) 5 1 - 33 U/L    AST (SGOT) 26 1 - 32 U/L    Alkaline Phosphatase 45 39 - 117 U/L    Total Bilirubin 0.2 0.0 - 1.2 mg/dL    Globulin 4.0 gm/dL    A/G Ratio 1.0 g/dL    BUN/Creatinine Ratio 12.7 7.0 - 25.0    Anion Gap 11.8 5.0 - 15.0 mmol/L    eGFR 113.9 >60.0 mL/min/1.73   Urinalysis With Culture If Indicated - Urine, Clean Catch    Specimen: Urine, Clean Catch   Result Value Ref Range    Color, UA Yellow Yellow, Straw    Appearance, UA Clear Clear    pH, UA 7.5 5.0 - 8.0    Specific Gravity, UA 1.018 1.005 - 1.030    Glucose, UA Negative Negative    Ketones, UA Trace (A) Negative    Bilirubin, UA Negative Negative    Blood, UA Negative Negative    Protein, UA Negative Negative    Leuk Esterase, UA Negative Negative    Nitrite, UA Negative Negative    Urobilinogen, UA 0.2 E.U./dL 0.2 - 1.0 E.U./dL   CBC Auto Differential     Specimen: Arm, Left; Blood   Result Value Ref Range    WBC 5.34 3.40 - 10.80 10*3/mm3    RBC 4.19 3.77 - 5.28 10*6/mm3    Hemoglobin 12.5 12.0 - 15.9 g/dL    Hematocrit 38.4 34.0 - 46.6 %    MCV 91.6 79.0 - 97.0 fL    MCH 29.8 26.6 - 33.0 pg    MCHC 32.6 31.5 - 35.7 g/dL    RDW 14.1 12.3 - 15.4 %    RDW-SD 47.6 37.0 - 54.0 fl    MPV 9.2 6.0 - 12.0 fL    Platelets 146 140 - 450 10*3/mm3    Neutrophil % 59.5 42.7 - 76.0 %    Lymphocyte % 25.5 19.6 - 45.3 %    Monocyte % 13.3 (H) 5.0 - 12.0 %    Eosinophil % 1.1 0.3 - 6.2 %    Basophil % 0.4 0.0 - 1.5 %    Immature Grans % 0.2 0.0 - 0.5 %    Neutrophils, Absolute 3.18 1.70 - 7.00 10*3/mm3    Lymphocytes, Absolute 1.36 0.70 - 3.10 10*3/mm3    Monocytes, Absolute 0.71 0.10 - 0.90 10*3/mm3    Eosinophils, Absolute 0.06 0.00 - 0.40 10*3/mm3    Basophils, Absolute 0.02 0.00 - 0.20 10*3/mm3    Immature Grans, Absolute 0.01 0.00 - 0.05 10*3/mm3    nRBC 0.0 0.0 - 0.2 /100 WBC   Green Top (Gel)   Result Value Ref Range    Extra Tube Hold for add-ons.    Lavender Top   Result Value Ref Range    Extra Tube hold for add-on    Gold Top - SST   Result Value Ref Range    Extra Tube Hold for add-ons.    Light Blue Top   Result Value Ref Range    Extra Tube Hold for add-ons.          Medical Decision Making  48 yo female patient with hx of anxiety, depression, Thyroid disease, GERD, hep C, HTN, and seizures presents to the ED for cough, congestion, headaches, body aches, and dysuria.  Pt states at times she has gotten dizzy. Patient states her symptoms started today. Patient states that she has been exposed to her sick . Patient denies any worsening or alleviating factors.  Patient is influenza A positive. Pt will f/u with PCP. Discussed sx and red flags that would warrant return to the ED.     Influenza A: complicated acute illness or injury  Amount and/or Complexity of Data Reviewed  Labs: ordered. Decision-making details documented in ED Course.      Risk  OTC  drugs.          Final diagnoses:   Influenza A       ED Disposition  ED Disposition     ED Disposition   Discharge    Condition   Stable    Comment   --             Salome Li MD  [address] [place] [address]  [phone]    Schedule an appointment as soon as possible for a visit in 3 days           Medication List      New Prescriptions    oseltamivir 75 MG capsule  Commonly known as: TAMIFLU  Take 1 capsule by mouth 2 (Two) Times a Day for 5 days.           Where to Get Your Medications      These medications were sent to [facility], [address] - [phone]  - [phone] [address] [address]    Phone: [phone]   · oseltamivir 75 MG capsule          Linda Swain PA  03/10/23 1904       Linda Swain PA  03/10/23 1904

## 2023-03-14 DIAGNOSIS — F43.12 CHRONIC POST-TRAUMATIC STRESS DISORDER (PTSD): ICD-10-CM

## 2023-03-14 DIAGNOSIS — F41.1 GAD (GENERALIZED ANXIETY DISORDER): ICD-10-CM

## 2023-03-14 DIAGNOSIS — F40.10 SOCIAL ANXIETY DISORDER: ICD-10-CM

## 2023-03-15 RX ORDER — ARIPIPRAZOLE 5 MG/1
TABLET ORAL
Qty: 30 TABLET | Refills: 0 | Status: SHIPPED | OUTPATIENT
Start: 2023-03-15

## 2023-03-15 RX ORDER — VENLAFAXINE HYDROCHLORIDE 150 MG/1
CAPSULE, EXTENDED RELEASE ORAL
Qty: 30 CAPSULE | Refills: 0 | Status: SHIPPED | OUTPATIENT
Start: 2023-03-15

## 2023-03-20 ENCOUNTER — OFFICE VISIT (OUTPATIENT)
Dept: FAMILY MEDICINE CLINIC | Facility: CLINIC | Age: 48
End: 2023-03-20
Payer: COMMERCIAL

## 2023-03-20 VITALS
BODY MASS INDEX: 27.4 KG/M2 | HEIGHT: 67 IN | DIASTOLIC BLOOD PRESSURE: 74 MMHG | SYSTOLIC BLOOD PRESSURE: 102 MMHG | WEIGHT: 174.6 LBS | HEART RATE: 68 BPM | OXYGEN SATURATION: 99 % | TEMPERATURE: 96.9 F

## 2023-03-20 DIAGNOSIS — R53.83 OTHER FATIGUE: ICD-10-CM

## 2023-03-20 DIAGNOSIS — M54.9 BACK PAIN, UNSPECIFIED BACK LOCATION, UNSPECIFIED BACK PAIN LATERALITY, UNSPECIFIED CHRONICITY: ICD-10-CM

## 2023-03-20 DIAGNOSIS — J44.1 COPD WITH EXACERBATION: Primary | ICD-10-CM

## 2023-03-20 DIAGNOSIS — R11.0 NAUSEA: ICD-10-CM

## 2023-03-20 DIAGNOSIS — N30.10 INTERSTITIAL CYSTITIS: ICD-10-CM

## 2023-03-20 LAB
BILIRUB BLD-MCNC: NEGATIVE MG/DL
CLARITY, POC: CLEAR
COLOR UR: YELLOW
EXPIRATION DATE: ABNORMAL
EXPIRATION DATE: NORMAL
FLUAV AG UPPER RESP QL IA.RAPID: NOT DETECTED
FLUBV AG UPPER RESP QL IA.RAPID: NOT DETECTED
GLUCOSE UR STRIP-MCNC: NEGATIVE MG/DL
INTERNAL CONTROL: NORMAL
KETONES UR QL: NEGATIVE
LEUKOCYTE EST, POC: NEGATIVE
Lab: ABNORMAL
Lab: NORMAL
NITRITE UR-MCNC: NEGATIVE MG/ML
PH UR: 6 [PH] (ref 5–8)
PROT UR STRIP-MCNC: ABNORMAL MG/DL
RBC # UR STRIP: NEGATIVE /UL
SARS-COV-2 AG UPPER RESP QL IA.RAPID: NOT DETECTED
SP GR UR: 1.02 (ref 1–1.03)
UROBILINOGEN UR QL: NORMAL

## 2023-03-20 PROCEDURE — 87086 URINE CULTURE/COLONY COUNT: CPT | Performed by: FAMILY MEDICINE

## 2023-03-20 RX ORDER — LEVOFLOXACIN 750 MG/1
750 TABLET ORAL DAILY
Qty: 10 TABLET | Refills: 0 | Status: SHIPPED | OUTPATIENT
Start: 2023-03-20

## 2023-03-20 RX ORDER — METHYLPREDNISOLONE SODIUM SUCCINATE 40 MG/ML
40 INJECTION, POWDER, LYOPHILIZED, FOR SOLUTION INTRAMUSCULAR; INTRAVENOUS ONCE
Status: COMPLETED | OUTPATIENT
Start: 2023-03-20 | End: 2023-03-20

## 2023-03-20 RX ORDER — AMITRIPTYLINE HYDROCHLORIDE 25 MG/1
25 TABLET, FILM COATED ORAL NIGHTLY
Qty: 30 TABLET | Refills: 0 | Status: SHIPPED | OUTPATIENT
Start: 2023-03-20

## 2023-03-20 RX ORDER — GUAIFENESIN 600 MG/1
1200 TABLET, EXTENDED RELEASE ORAL 2 TIMES DAILY
Qty: 120 TABLET | Refills: 0 | Status: SHIPPED | OUTPATIENT
Start: 2023-03-20

## 2023-03-20 RX ORDER — PREDNISONE 10 MG/1
TABLET ORAL
Qty: 30 TABLET | Refills: 0 | Status: SHIPPED | OUTPATIENT
Start: 2023-03-20

## 2023-03-20 RX ADMIN — METHYLPREDNISOLONE SODIUM SUCCINATE 40 MG: 40 INJECTION, POWDER, LYOPHILIZED, FOR SOLUTION INTRAMUSCULAR; INTRAVENOUS at 15:22

## 2023-03-20 NOTE — PROGRESS NOTES
"Meghan Rivas     VITALS: Blood pressure 102/74, pulse 68, temperature 96.9 °F (36.1 °C), height 170.2 cm (67\"), weight 79.2 kg (174 lb 9.6 oz), SpO2 99 %, not currently breastfeeding.    Subjective  Chief Complaint  Difficulty Urinating and Fatigue    Subjective          History of Present Illness:  The patient is a 47 y.o.  female with medical conditions significant for hypothyroidism and gastroesophageal reflux who presents to the clinic secondary to medical follow-up. She thinks that she has a urinary tract infection on 03/20/2023.     She believes she has another urinary tract infection. She had the influenza virus last week and was told that she may have pneumonia. She is not doing well and it hurts when she eats. It does not hurt while she is urinating, but it is at the end of her urination. She is not experiencing bowel movements. She has an albuterol inhaler at home. Her back hurts every time she gets pneumonia and she can not afford to miss work. She works as a  and a . The cough is not waking her up.    No complaints about any of the medications currently prescribed.     The following portions of the patient's history were reviewed and updated as appropriate: allergies, current medications, past family history, past medical history, past social history, past surgical history and problem list.    Past Medical History  Past Medical History:   Diagnosis Date   • Anxiety    • Depression    • Disease of thyroid gland    • GERD (gastroesophageal reflux disease)    • Hepatitis C    • History of drug abuse (HCC)    • Hypertension    • Seizures (HCC)     LAST SEIZURE 2019       Surgical History  Past Surgical History:   Procedure Laterality Date   • APPENDECTOMY N/A 3/8/2022    Procedure: APPENDECTOMY LAPAROSCOPIC;  Surgeon: Babar Perkins MD;  Location: [facility];  Service: General;  Laterality: N/A;   • COLONOSCOPY N/A 10/5/2022    Procedure: COLONOSCOPY;  Surgeon: Neris Bear, " "MD;  Location: [facility] OR;  Service: Gastroenterology;  Laterality: N/A;   • OVARIAN CYST SURGERY      x7 surgeries       Family History  Family History   Adopted: Yes   Problem Relation Age of Onset   • No Known Problems Adoptive Father    • Behavior problems Adoptive Mother        Social History  Social History     Socioeconomic History   • Marital status:    Tobacco Use   • Smoking status: Every Day     Packs/day: 0.25     Years: 3.50     Pack years: 0.88     Types: Cigarettes   • Smokeless tobacco: Never   Vaping Use   • Vaping Use: Former   Substance and Sexual Activity   • Alcohol use: Not Currently     Comment: sober 4 years   • Drug use: Not Currently     Types: IV, Heroin, Methamphetamines     Comment: clean 5 years   • Sexual activity: Yes     Partners: Male       Objective   Vital Signs:   /74 (BP Location: Right arm, Patient Position: Sitting, Cuff Size: Adult)   Pulse 68   Temp 96.9 °F (36.1 °C)   Ht 170.2 cm (67\")   Wt 79.2 kg (174 lb 9.6 oz)   SpO2 99%   BMI 27.35 kg/m²     Physical Exam     Gen: Patient in NAD. Pleasant and answers appropriately. A&Ox3.    Skin: Warm and dry with normal turgor. No purpura, rashes, or unusual pigmentation noted. Hair is normal in appearance and distribution.    HEENT: NC/AT. No lesions noted. Conjunctiva clear, sclera nonicteric. PERRL. EOMI without nystagmus or strabismus. Fundi appear benign. No hemorrhages or exudates of eyes. Auditory canals are patent bilaterally without lesions. TMs intact,  nonerythematous, bulging without lesions. Nasal mucosa erythematous, and nonedematous. Frontal and maxillary sinuses are nontender. O/P erythematous and moist without exudate.    Neck: Supple without lymph nodes palpated. FROM.     Lungs: Slightly decreased B/L without rales, rhonchi, crackles, or wheezes.    Heart: RRR. S1 and S2 normal. No S3 or S4. No MRGT.    Abd: Soft, nontender,nondistended. (+)BSx4 quadrants.  No flank pain bilaterally.    Extrem: No " CCE. Radial pulses 2+/4 and equal B/L. FROMx4. No bone, joint, or muscle tenderness noted.    Neuro: No focal motor/sensory deficits.    Procedures       Assessment and Plan    Meghan Rivas is a 47 y.o. here for medical followup.    Problem List Items Addressed This Visit    None  Visit Diagnoses     Other fatigue    -  Primary    Relevant Medications    methylPREDNISolone sodium succinate (SOLU-Medrol) injection 40 mg (Completed)    Other Relevant Orders    POCT SARS-CoV-2 Antigen BIANCA + Flu (Completed)    POCT urinalysis dipstick, automated (Completed)    Nausea        Relevant Orders    POCT SARS-CoV-2 Antigen BIANCA + Flu (Completed)    POCT urinalysis dipstick, automated (Completed)    Back pain, unspecified back location, unspecified back pain laterality, unspecified chronicity        Relevant Orders    POCT urinalysis dipstick, automated (Completed)    Interstitial cystitis        Relevant Medications    amitriptyline (ELAVIL) 25 MG tablet    Other Relevant Orders    Urine Culture - Urine, Urine, Clean Catch    COPD with exacerbation (HCC)        Relevant Medications    predniSONE (DELTASONE) 10 MG tablet    levoFLOXacin (LEVAQUIN) 750 MG tablet    guaiFENesin (Mucinex) 600 MG 12 hr tablet    Other Relevant Orders    XR Chest 2 View        Plan:     1. Urinary tract infection.  - I will prescribe Bactrim and Levaquin.  - I will order and review a urine culture sample.       BMI is >= 25 and <30. (Overweight) The following options were offered after discussion;: exercise counseling/recommendations and nutrition counseling/recommendations       Meghan Rivas  reports that she has been smoking cigarettes. She has a 0.88 pack-year smoking history. She has never used smokeless tobacco.. I have educated her on the risk of diseases from using tobacco products such as cancer, COPD and heart disease.     I advised her to quit and she is not willing to quit.    I spent 3  minutes counseling the patient.                 Follow Up   Return in about 2 years (around 3/20/2025), or HARD 2 weeks; will come back for CXR.  Findings and plans discussed with patient who verbalizes understanding and agreement. Will followup with patient once results are in. Patient was given instructions and counseling regarding her condition or for health maintenance advice. Please see specific information pulled into the AVS if appropriate.       Transcribed from ambient dictation for Salome Li MD by Odette Navas.  03/20/23   16:02 EDT          Salome Li MD

## 2023-03-21 LAB — BACTERIA SPEC AEROBE CULT: NORMAL

## 2023-04-24 ENCOUNTER — OFFICE VISIT (OUTPATIENT)
Dept: PSYCHIATRY | Facility: CLINIC | Age: 48
End: 2023-04-24
Payer: COMMERCIAL

## 2023-04-24 DIAGNOSIS — F33.1 MAJOR DEPRESSIVE DISORDER, RECURRENT EPISODE, MODERATE: ICD-10-CM

## 2023-04-24 DIAGNOSIS — F43.23 ADJUSTMENT DISORDER WITH MIXED ANXIETY AND DEPRESSED MOOD: Primary | ICD-10-CM

## 2023-04-24 DIAGNOSIS — F41.1 GAD (GENERALIZED ANXIETY DISORDER): ICD-10-CM

## 2023-04-24 PROCEDURE — 90834 PSYTX W PT 45 MINUTES: CPT | Performed by: COUNSELOR
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
## 2023-04-26 DIAGNOSIS — F41.1 GAD (GENERALIZED ANXIETY DISORDER): ICD-10-CM

## 2023-04-26 DIAGNOSIS — F43.12 CHRONIC POST-TRAUMATIC STRESS DISORDER (PTSD): ICD-10-CM

## 2023-04-27 RX ORDER — ARIPIPRAZOLE 5 MG/1
5 TABLET ORAL NIGHTLY
Qty: 30 TABLET | Refills: 1 | Status: SHIPPED | OUTPATIENT
Start: 2023-04-27 | End: 2023-05-01 | Stop reason: SDUPTHER

## 2023-05-01 ENCOUNTER — OFFICE VISIT (OUTPATIENT)
Dept: PSYCHIATRY | Facility: CLINIC | Age: 48
End: 2023-05-01
Payer: COMMERCIAL

## 2023-05-01 VITALS
SYSTOLIC BLOOD PRESSURE: 134 MMHG | HEART RATE: 103 BPM | DIASTOLIC BLOOD PRESSURE: 70 MMHG | BODY MASS INDEX: 29.16 KG/M2 | HEIGHT: 67 IN | WEIGHT: 185.8 LBS

## 2023-05-01 DIAGNOSIS — F12.10 CANNABIS ABUSE, EPISODIC: ICD-10-CM

## 2023-05-01 DIAGNOSIS — F51.5 NIGHTMARES ASSOCIATED WITH CHRONIC POST-TRAUMATIC STRESS DISORDER: ICD-10-CM

## 2023-05-01 DIAGNOSIS — Z79.899 MEDICATION MANAGEMENT: ICD-10-CM

## 2023-05-01 DIAGNOSIS — F17.200 NICOTINE USE DISORDER: ICD-10-CM

## 2023-05-01 DIAGNOSIS — F40.10 SOCIAL ANXIETY DISORDER: ICD-10-CM

## 2023-05-01 DIAGNOSIS — F41.1 GAD (GENERALIZED ANXIETY DISORDER): ICD-10-CM

## 2023-05-01 DIAGNOSIS — F43.12 CHRONIC POST-TRAUMATIC STRESS DISORDER (PTSD): Primary | ICD-10-CM

## 2023-05-01 DIAGNOSIS — F43.12 NIGHTMARES ASSOCIATED WITH CHRONIC POST-TRAUMATIC STRESS DISORDER: ICD-10-CM

## 2023-05-01 DIAGNOSIS — F41.0 PANIC ATTACKS: ICD-10-CM

## 2023-05-01 RX ORDER — PROPRANOLOL HYDROCHLORIDE 20 MG/1
TABLET ORAL
Qty: 60 TABLET | Refills: 1 | Status: SHIPPED | OUTPATIENT
Start: 2023-05-01

## 2023-05-01 RX ORDER — LORAZEPAM 0.5 MG/1
0.5 TABLET ORAL DAILY PRN
Qty: 30 TABLET | Refills: 2 | Status: SHIPPED | OUTPATIENT
Start: 2023-05-01

## 2023-05-01 RX ORDER — ARIPIPRAZOLE 5 MG/1
5 TABLET ORAL NIGHTLY
Qty: 30 TABLET | Refills: 2 | Status: SHIPPED | OUTPATIENT
Start: 2023-05-01

## 2023-05-01 RX ORDER — QUETIAPINE FUMARATE 25 MG/1
25 TABLET, FILM COATED ORAL NIGHTLY
Qty: 30 TABLET | Refills: 2 | Status: SHIPPED | OUTPATIENT
Start: 2023-05-01

## 2023-05-01 RX ORDER — VENLAFAXINE HYDROCHLORIDE 150 MG/1
150 CAPSULE, EXTENDED RELEASE ORAL DAILY
Qty: 30 CAPSULE | Refills: 2 | Status: SHIPPED | OUTPATIENT
Start: 2023-05-01

## 2023-05-01 NOTE — PROGRESS NOTES
Subjective   Meghan Rivas is a 47 y.o. female who presents today for follow up    Chief Complaint: History of bipolar disorder, PTSD, substance abuse, depression, anxiety    History of Present Illness: Patient presented today for follow-up.  Since last visit, she has been doing better.  She is managing stressors well and feels that mood has been more even.  She does not have any overt depressive or anxious symptoms but he is experiencing a lot of stress at home currently as her  lost his job and they have been struggling somewhat financially which she has managed much better than she has in the past.  She could not tolerate prazosin as it caused side effects and did not help with nightmares.  She continues to have them and feels that she is having panic-like symptoms in the middle of the night when she wakes up and has to walk, smoke, or do something to relax before trying to even go back to bed.  She has only had 1 major panic episode since last visit and it was at work and out of the blue with significant physical symptoms and she had leg numbness and enuresis with this episode which cause it to be more devastating due to embarrassment.  She denies SI/HI/AVH.      The following portions of the patient's history were reviewed and updated as appropriate: allergies, current medications, past family history, past medical history, past social history, past surgical history and problem list.      Past Medical History:  Past Medical History:   Diagnosis Date   • Anxiety    • Depression    • Disease of thyroid gland    • GERD (gastroesophageal reflux disease)    • Hepatitis C    • History of drug abuse    • Hypertension    • Seizures     LAST SEIZURE 2019       Social History:  Social History     Socioeconomic History   • Marital status:    Tobacco Use   • Smoking status: Every Day     Packs/day: 0.25     Years: 3.50     Pack years: 0.88     Types: Cigarettes   • Smokeless tobacco: Never   Vaping Use   •  Vaping Use: Former   Substance and Sexual Activity   • Alcohol use: Not Currently     Comment: sober 4 years   • Drug use: Not Currently     Types: IV, Heroin, Methamphetamines     Comment: clean 5 years   • Sexual activity: Yes     Partners: Male       Family History:  Family History   Adopted: Yes   Problem Relation Age of Onset   • No Known Problems Adoptive Father    • Behavior problems Adoptive Mother        Past Surgical History:  Past Surgical History:   Procedure Laterality Date   • APPENDECTOMY N/A 3/8/2022    Procedure: APPENDECTOMY LAPAROSCOPIC;  Surgeon: Babar Perkins MD;  Location: [facility];  Service: General;  Laterality: N/A;   • COLONOSCOPY N/A 10/5/2022    Procedure: COLONOSCOPY;  Surgeon: Neris Bear MD;  Location: [facility];  Service: Gastroenterology;  Laterality: N/A;   • OVARIAN CYST SURGERY      x7 surgeries       Problem List:  Patient Active Problem List   Diagnosis   • Chronic hepatitis C without hepatic coma   • MVP (mitral valve prolapse)   • Hx of drug abuse   • Leg neuralgia, right   • Anxiety and depression   • Gastroesophageal reflux disease   • Syncope   • Acute appendicitis   • Chronic idiopathic constipation       Allergy:   Allergies   Allergen Reactions   • Contrast Dye (Echo Or Unknown Ct/Mr) Anaphylaxis     Tolerated IV contrast CT 3/7/2022        Current Medications:   Current Outpatient Medications   Medication Sig Dispense Refill   • albuterol sulfate  (90 Base) MCG/ACT inhaler Inhale 2 puffs Every 6 (Six) Hours As Needed for Wheezing. 6.7 g 2   • ARIPiprazole (ABILIFY) 5 MG tablet Take 1 tablet by mouth Every Night. 30 tablet 1   • guaiFENesin (Mucinex) 600 MG 12 hr tablet Take 2 tablets by mouth 2 (Two) Times a Day. 120 tablet 0   • levothyroxine (Synthroid) 75 MCG tablet Take 1 tablet by mouth Daily. 30 tablet 2   • LORazepam (Ativan) 0.5 MG tablet Take 1 tablet by mouth Daily As Needed for Anxiety. 30 tablet 1   • omeprazole (PrilOSEC) 40 MG  "capsule Take 1 capsule by mouth Daily. 30 capsule 2   • propranolol (INDERAL) 20 MG tablet Take 0.5 to 1 tablet PO up to twice daily as needed for anxiety. 60 tablet 1   • venlafaxine XR (EFFEXOR-XR) 150 MG 24 hr capsule Take 1 capsule by mouth once daily 30 capsule 0     No current facility-administered medications for this visit.       Review of Symptoms:    Review of Systems   Constitutional: Positive for fatigue. Negative for activity change.   HENT: Negative for congestion and rhinorrhea.    Eyes: Negative for blurred vision and visual disturbance.   Respiratory: Negative for cough and shortness of breath.    Cardiovascular: Negative for chest pain and palpitations.   Gastrointestinal: Negative for nausea and vomiting.   Endocrine: Negative for cold intolerance and heat intolerance.   Genitourinary: Negative for dysuria and frequency.   Musculoskeletal: Negative for arthralgias and myalgias.   Skin: Negative for rash and wound.   Allergic/Immunologic: Negative for environmental allergies and food allergies.   Neurological: Negative for weakness and confusion.   Hematological: Negative for adenopathy. Does not bruise/bleed easily.   Psychiatric/Behavioral: Positive for sleep disturbance. Negative for agitation, decreased concentration, dysphoric mood, suicidal ideas, depressed mood and stress. The patient is nervous/anxious.          Physical Exam:   Blood pressure 134/70, pulse 103, height 170.2 cm (67.01\"), weight 84.3 kg (185 lb 12.8 oz), not currently breastfeeding.    Appearance: CF of stated age, NAD   Gait, Station, Strength: WNL    Mental Status Exam:     Mental Status exam performed 05/01/2023  and patient shows no significant changes from previous exam.     Hygiene:   good  Cooperation:  Cooperative  Eye Contact:  Good  Psychomotor Behavior:  Appropriate  Affect:  Full range  Mood: anxious and fluctates-overall continues to be improved  Hopelessness: Optimistic  Speech:  Normal  Thought Process:  Goal " directed and Linear  Thought Content:  Normal and Mood congruent  Suicidal:  None, resolved  Homicidal:  None  Hallucinations:  None  Delusion:  None  Memory:  Intact  Orientation:  Person, Place, Time and Situation  Reliability:  good  Insight:  Fair  Judgement:  Good  Impulse Control:  Good      Lab Results:   No visits with results within 1 Month(s) from this visit.   Latest known visit with results is:   Office Visit on 03/20/2023   Component Date Value Ref Range Status   • SARS Antigen 03/20/2023 Not Detected  Not Detected, Presumptive Negative Final   • Influenza A Antigen BIANCA 03/20/2023 Not Detected  Not Detected Final   • Influenza B Antigen BIANCA 03/20/2023 Not Detected  Not Detected Final   • Internal Control 03/20/2023 Passed  Passed Final   • Lot Number 03/20/2023 [id number]   Final   • Expiration Date 03/20/2023 2024-01-12   Final   • Color 03/20/2023 Yellow  Yellow, Straw, Dark Yellow, Jessi Final   • Clarity, UA 03/20/2023 Clear  Clear Final   • Specific Gravity  03/20/2023 1.020  1.005 - 1.030 Final   • pH, Urine 03/20/2023 6.0  5.0 - 8.0 Final   • Leukocytes 03/20/2023 Negative  Negative Final   • Nitrite, UA 03/20/2023 Negative  Negative Final   • Protein, POC 03/20/2023 Trace (A)  Negative mg/dL Final   • Glucose, UA 03/20/2023 Negative  Negative mg/dL Final   • Ketones, UA 03/20/2023 Negative  Negative Final   • Urobilinogen, UA 03/20/2023 Normal  Normal, 0.2 E.U./dL Final   • Bilirubin 03/20/2023 Negative  Negative Final   • Blood, UA 03/20/2023 Negative  Negative Final   • Lot Number 03/20/2023 [id number]   Final   • Expiration Date 03/20/2023 2024-02-10   Final   • Urine Culture 03/20/2023 25,000 CFU/mL Mixed Parvin Isolated   Final       Assessment & Plan   Diagnoses and all orders for this visit:    1. Chronic post-traumatic stress disorder (PTSD) (Primary)  -     LORazepam (Ativan) 0.5 MG tablet; Take 1 tablet by mouth Daily As Needed for Anxiety.  Dispense: 30 tablet; Refill: 2  -      venlafaxine XR (EFFEXOR-XR) 150 MG 24 hr capsule; Take 1 capsule by mouth Daily.  Dispense: 30 capsule; Refill: 2  -     ARIPiprazole (ABILIFY) 5 MG tablet; Take 1 tablet by mouth Every Night.  Dispense: 30 tablet; Refill: 2  -     QUEtiapine (SEROquel) 25 MG tablet; Take 1 tablet by mouth Every Night.  Dispense: 30 tablet; Refill: 2    2. Medication management  -     [facility] Drug Screen    3. AMARIS (generalized anxiety disorder)  -     LORazepam (Ativan) 0.5 MG tablet; Take 1 tablet by mouth Daily As Needed for Anxiety.  Dispense: 30 tablet; Refill: 2  -     venlafaxine XR (EFFEXOR-XR) 150 MG 24 hr capsule; Take 1 capsule by mouth Daily.  Dispense: 30 capsule; Refill: 2  -     propranolol (INDERAL) 20 MG tablet; Take 0.5 to 1 tablet PO up to twice daily as needed for anxiety.  Dispense: 60 tablet; Refill: 1  -     ARIPiprazole (ABILIFY) 5 MG tablet; Take 1 tablet by mouth Every Night.  Dispense: 30 tablet; Refill: 2  -     QUEtiapine (SEROquel) 25 MG tablet; Take 1 tablet by mouth Every Night.  Dispense: 30 tablet; Refill: 2    4. Social anxiety disorder  -     LORazepam (Ativan) 0.5 MG tablet; Take 1 tablet by mouth Daily As Needed for Anxiety.  Dispense: 30 tablet; Refill: 2  -     venlafaxine XR (EFFEXOR-XR) 150 MG 24 hr capsule; Take 1 capsule by mouth Daily.  Dispense: 30 capsule; Refill: 2  -     propranolol (INDERAL) 20 MG tablet; Take 0.5 to 1 tablet PO up to twice daily as needed for anxiety.  Dispense: 60 tablet; Refill: 1    5. Panic attacks  -     LORazepam (Ativan) 0.5 MG tablet; Take 1 tablet by mouth Daily As Needed for Anxiety.  Dispense: 30 tablet; Refill: 2  -     QUEtiapine (SEROquel) 25 MG tablet; Take 1 tablet by mouth Every Night.  Dispense: 30 tablet; Refill: 2    6. Nicotine use disorder    7. Cannabis abuse, episodic    8. Nightmares associated with chronic post-traumatic stress disorder  -     QUEtiapine (SEROquel) 25 MG tablet; Take 1 tablet by mouth Every Night.  Dispense: 30 tablet;  Refill: 2    -Patient maintaining better than she has in the past but continues to have anxiety and stressors.  Nightmares were not controlled with prazosin and it caused negative side effects we will discontinue this today.  This continues to be an issue and she has significant nightmares with panic at night.  She has only had 1 major panic episode during the day since last visit which was significant in nature but is a reduced frequency from previous  -Reviewed previous available documentation  -Reviewed most recent available labs   -NALINI reviewed and appropriate. Patient counseled on use of controlled substances.   -Continue Abilify 5 mg p.o. nightly for mood stabilization and anxiety  -Continue Effexor  mg p.o. daily for mood and anxiety  -Continue propranolol 10 to 20 mg p.o. as needed 30 minutes to an hour prior to anxiety provoking event for situational and social anxiety  -Discontinue prazosin  -Continue Ativan 0.5 mg p.o. daily as needed for anxiety.  Advised patient to only take the medication as needed in times of crisis  -Start Seroquel 25 mg p.o. nightly as needed for insomnia and nightmares  -Encourage cessation of nicotine  -Encourage cessation of cannabis  -Encouraged continued cessation of methamphetamine  -Encouraged to continue with therapy      Visit Diagnoses:    ICD-10-CM ICD-9-CM   1. Chronic post-traumatic stress disorder (PTSD)  F43.12 309.81   2. Medication management  Z79.899 V58.69   3. AMARIS (generalized anxiety disorder)  F41.1 300.02   4. Social anxiety disorder  F40.10 300.23   5. Panic attacks  F41.0 300.01   6. Nicotine use disorder  F17.200 305.1   7. Cannabis abuse, episodic  F12.10 305.22   8. Nightmares associated with chronic post-traumatic stress disorder  F51.5 307.47    F43.12 309.81       TREATMENT PLAN/GOALS: Continue supportive psychotherapy efforts and medications as indicated. Treatment and medication options discussed during today's visit. Patient acknowledged  and verbally consented to continue with current treatment plan and was educated on the importance of compliance with treatment and follow-up appointments.    MEDICATION ISSUES:    Discussed medication options and treatment plan of prescribed medication as well as the risks, benefits, and side effects including potential falls, possible impaired driving and metabolic adversities among others. Patient is agreeable to call the office with any worsening of symptoms or onset of side effects. Patient is agreeable to call 911 or go to the nearest ER should he/she begin having SI/HI.     MEDS ORDERED DURING VISIT:  New Medications Ordered This Visit   Medications   • LORazepam (Ativan) 0.5 MG tablet     Sig: Take 1 tablet by mouth Daily As Needed for Anxiety.     Dispense:  30 tablet     Refill:  2   • venlafaxine XR (EFFEXOR-XR) 150 MG 24 hr capsule     Sig: Take 1 capsule by mouth Daily.     Dispense:  30 capsule     Refill:  2   • propranolol (INDERAL) 20 MG tablet     Sig: Take 0.5 to 1 tablet PO up to twice daily as needed for anxiety.     Dispense:  60 tablet     Refill:  1   • ARIPiprazole (ABILIFY) 5 MG tablet     Sig: Take 1 tablet by mouth Every Night.     Dispense:  30 tablet     Refill:  2   • QUEtiapine (SEROquel) 25 MG tablet     Sig: Take 1 tablet by mouth Every Night.     Dispense:  30 tablet     Refill:  2       Return in about 3 months (around 8/1/2023).             This document has been electronically signed by Giacomo Phipps MD  May 1, 2023 14:00 EDT    Dictated using Dragon Dictation.

## 2023-05-08 ENCOUNTER — TELEPHONE (OUTPATIENT)
Dept: PSYCHIATRY | Facility: CLINIC | Age: 48
End: 2023-05-08
Payer: COMMERCIAL

## 2023-05-08 NOTE — TELEPHONE ENCOUNTER
QUEtiapine Fumarate 25MG tablets    Key: [id number] - PA Case ID: [id number] - Rx #: [id number]  Prior Authorization is not required for this medication dosage form and strength at the quantity and days supply requested.

## 2023-05-09 RX ORDER — LEVOTHYROXINE SODIUM 0.07 MG/1
TABLET ORAL
Qty: 90 TABLET | Refills: 0 | Status: SHIPPED | OUTPATIENT
Start: 2023-05-09

## 2023-08-03 RX ORDER — LEVOTHYROXINE SODIUM 0.07 MG/1
TABLET ORAL
Qty: 90 TABLET | Refills: 0 | OUTPATIENT
Start: 2023-08-03

## 2023-08-04 ENCOUNTER — OFFICE VISIT (OUTPATIENT)
Dept: FAMILY MEDICINE CLINIC | Facility: CLINIC | Age: 48
End: 2023-08-04
Payer: COMMERCIAL

## 2023-08-04 VITALS
WEIGHT: 185.6 LBS | SYSTOLIC BLOOD PRESSURE: 116 MMHG | HEART RATE: 83 BPM | TEMPERATURE: 97.1 F | OXYGEN SATURATION: 98 % | BODY MASS INDEX: 29.13 KG/M2 | HEIGHT: 67 IN | DIASTOLIC BLOOD PRESSURE: 68 MMHG

## 2023-08-04 DIAGNOSIS — Z87.440 HISTORY OF UTI: Primary | ICD-10-CM

## 2023-08-04 DIAGNOSIS — E03.9 ACQUIRED HYPOTHYROIDISM: ICD-10-CM

## 2023-08-04 DIAGNOSIS — L73.2 HIDRADENITIS SUPPURATIVA OF RIGHT AXILLA: ICD-10-CM

## 2023-08-04 DIAGNOSIS — R53.83 OTHER FATIGUE: ICD-10-CM

## 2023-08-04 LAB
BILIRUB BLD-MCNC: NEGATIVE MG/DL
CLARITY, POC: ABNORMAL
COLOR UR: ABNORMAL
EXPIRATION DATE: ABNORMAL
GLUCOSE UR STRIP-MCNC: NEGATIVE MG/DL
KETONES UR QL: NEGATIVE
LEUKOCYTE EST, POC: NEGATIVE
Lab: ABNORMAL
NITRITE UR-MCNC: NEGATIVE MG/ML
PH UR: 6 [PH] (ref 5–8)
PROT UR STRIP-MCNC: ABNORMAL MG/DL
RBC # UR STRIP: NEGATIVE /UL
SP GR UR: 1.03 (ref 1–1.03)
UROBILINOGEN UR QL: NORMAL

## 2023-08-04 PROCEDURE — 80053 COMPREHEN METABOLIC PANEL: CPT | Performed by: FAMILY MEDICINE

## 2023-08-04 PROCEDURE — 36415 COLL VENOUS BLD VENIPUNCTURE: CPT | Performed by: FAMILY MEDICINE

## 2023-08-04 PROCEDURE — 87086 URINE CULTURE/COLONY COUNT: CPT | Performed by: FAMILY MEDICINE

## 2023-08-04 PROCEDURE — 84443 ASSAY THYROID STIM HORMONE: CPT | Performed by: FAMILY MEDICINE

## 2023-08-04 PROCEDURE — 84439 ASSAY OF FREE THYROXINE: CPT | Performed by: FAMILY MEDICINE

## 2023-08-04 RX ORDER — CLINDAMYCIN PHOSPHATE 11.9 MG/ML
SOLUTION TOPICAL 2 TIMES DAILY
Qty: 60 ML | Refills: 1 | Status: SHIPPED | OUTPATIENT
Start: 2023-08-04

## 2023-08-04 RX ORDER — FLUCONAZOLE 150 MG/1
150 TABLET ORAL ONCE
Qty: 1 TABLET | Refills: 0 | Status: SHIPPED | OUTPATIENT
Start: 2023-08-04 | End: 2023-08-04

## 2023-08-04 RX ORDER — DOXYCYCLINE HYCLATE 100 MG/1
100 CAPSULE ORAL 2 TIMES DAILY
Qty: 14 CAPSULE | Refills: 0 | Status: SHIPPED | OUTPATIENT
Start: 2023-08-04

## 2023-08-04 RX ORDER — LEVOTHYROXINE SODIUM 0.07 MG/1
75 TABLET ORAL DAILY
Qty: 90 TABLET | Refills: 0 | Status: SHIPPED | OUTPATIENT
Start: 2023-08-04

## 2023-08-04 RX ORDER — CHLORHEXIDINE GLUCONATE 4 G/100ML
SOLUTION TOPICAL DAILY PRN
Qty: 500 ML | Refills: 1 | Status: SHIPPED | OUTPATIENT
Start: 2023-08-04

## 2023-08-04 NOTE — PROGRESS NOTES
"Meghan Rivas     VITALS: Blood pressure 116/68, pulse 83, temperature 97.1 øF (36.2 øC), temperature source Temporal, height 170.2 cm (67.01\"), weight 84.2 kg (185 lb 9.6 oz), SpO2 98 %, not currently breastfeeding.    Subjective  Chief Complaint  Knot under arm pit (Right arm) and History of uti    Subjective          History of Present Illness:  The patient is a 47-year-old female with medical conditions significant for gastroesophageal reflux disease and hypothyroidism who presents to clinic secondary to medical follow-up.     The patient reports that she has something under her right arm that is hurting her. She denies any fevers. She notes that she recently violently vomited and felt sick to her stomach. She notes that it has been ongoing for a couple of days. She has not soaked her arm.     She says that she has been to urgent care a couple of times in the past month due to urinary tract infections along with a rash in her mouth. She adds that she has not been tested for hepatitis C. She is also dealing with fatigue and would like a referral to .     The following portions of the patient's history were reviewed and updated as appropriate: allergies, current medications, past family history, past medical history, past social history, past surgical history and problem list.    Past Medical History  Past Medical History:   Diagnosis Date    Anxiety     Depression     Disease of thyroid gland     GERD (gastroesophageal reflux disease)     Hepatitis C     History of drug abuse     Hypertension     Seizures     LAST SEIZURE 2019       Surgical History  Past Surgical History:   Procedure Laterality Date    APPENDECTOMY N/A 3/8/2022    Procedure: APPENDECTOMY LAPAROSCOPIC;  Surgeon: Babar Perkins MD;  Location: [facility] OR;  Service: General;  Laterality: N/A;    COLONOSCOPY N/A 10/5/2022    Procedure: COLONOSCOPY;  Surgeon: Neris Bear MD;  Location: [facility] OR;  Service: " "Gastroenterology;  Laterality: N/A;    OVARIAN CYST SURGERY      x7 surgeries       Family History  Family History   Adopted: Yes   Problem Relation Age of Onset    No Known Problems Adoptive Father     Behavior problems Adoptive Mother        Social History  Social History     Socioeconomic History    Marital status:    Tobacco Use    Smoking status: Every Day     Packs/day: 0.25     Years: 3.50     Pack years: 0.88     Types: Cigarettes    Smokeless tobacco: Never   Vaping Use    Vaping Use: Former   Substance and Sexual Activity    Alcohol use: Not Currently     Comment: sober 4 years    Drug use: Not Currently     Types: IV, Heroin, Methamphetamines     Comment: clean 5 years    Sexual activity: Yes     Partners: Male       Objective   Vital Signs:   /68 (BP Location: Right arm, Patient Position: Sitting, Cuff Size: Adult)   Pulse 83   Temp 97.1 øF (36.2 øC) (Temporal)   Ht 170.2 cm (67.01\")   Wt 84.2 kg (185 lb 9.6 oz)   SpO2 98%   BMI 29.06 kg/mý     Physical Exam     Gen: Patient in NAD. Pleasant and answers appropriately. A&Ox3.    Skin: Warm and dry with normal turgor. No purpura, rashes, or unusual pigmentation noted. Hair is normal in appearance and distribution.  Hidradenitis suppurativa under the right armpit.    HEENT: NC/AT. No lesions noted. Conjunctiva clear, sclera non-icteric. PERRL. EOMI without nystagmus or strabismus. Fundi appear benign. No hemorrhages or exudates of eyes. Auditory canals are patent bilaterally without lesions. TMs intact,  non-erythematous, non-bulging without lesions. Nasal mucosa pink, non-erythematous, and non-edematous. Frontal and maxillary sinuses are nontender. O/P non-erythematous and moist without exudate.    Neck: Supple without lymph nodes palpated. FROM.     Lungs: Decreased B/L without rales, rhonchi, crackles, or wheezes.    Heart: RRR. S1 and S2 normal. No S3 or S4. No MRGT.    Abd: Soft, non-tender, non-distended. (+)BSx4 quadrants. "     Extrem: No CCE. Radial pulses 2+/4 and equal B/L. FROMx4.  Positive joint tenderness noted.    Neuro: No focal motor/sensory deficits.    Procedures    Result Review :   The following data was reviewed by: aSlome Li MD   on 08/04/2023:         POC Urinalysis Dipstick, Automated (08/04/2023 15:45)        Assessment and Plan    This is a 47-year-old female who presents to clinic for medical follow-up.      1. History of urinary tract infection  - I have prescribed doxycycline.  - I will send her urine for culture.     2. Acquired hypothyroidism  - I have refilled the patient's Synthroid.   - Blood work was ordered today.    3. Hidradenitis suppurativa of right axilla   - I have prescribed Hibiclens, Cleocin T, Diflucan, and doxycycline.  - The patient was advised to soak the affected area with hot water and Epson salt. She may also use a hot rag.   - Blood work was ordered today.    4.  Health maintenance  - I will refer the patient for social care services.    Problem List Items Addressed This Visit    None  Visit Diagnoses       History of UTI    -  Primary    Relevant Medications    doxycycline (VIBRAMYCIN) 100 MG capsule    Other Relevant Orders    POC Urinalysis Dipstick, Automated (Completed)    Urine Culture - Urine, Urine, Clean Catch    Comprehensive Metabolic Panel    Acquired hypothyroidism        Relevant Medications    levothyroxine (SYNTHROID, LEVOTHROID) 75 MCG tablet    Other Relevant Orders    TSH    T4, Free    Comprehensive Metabolic Panel    Other fatigue        Relevant Orders    Ambulatory Referral to Social Care Services (Amb Case Mgmt)    Hidradenitis suppurativa of right axilla        Relevant Medications    chlorhexidine (HIBICLENS) 4 % external liquid    clindamycin (CLEOCIN T) 1 % external solution    doxycycline (VIBRAMYCIN) 100 MG capsule    fluconazole (Diflucan) 150 MG tablet                    [place] Rob  reports that she has been smoking cigarettes. She has a 0.88  pack-year smoking history. She has never used smokeless tobacco.. I have educated her on the risk of diseases from using tobacco products such as cancer, COPD, and heart disease.     I advised her to quit and she is not willing to quit.    I spent 3  minutes counseling the patient.                Follow Up   Return in about 3 months (around 11/4/2023), or LABS.  Findings and plans discussed with patient who verbalizes understanding and agreement. Will followup with patient once results are in. Patient  was given instructions and counseling regarding her condition or for health maintenance advice. Please see specific information pulled into the AVS if appropriate.       Salome Li MD      Transcribed from ambient dictation for Salome Li MD by Wil Bernal.  08/04/23   16:40 EDT    Patient or patient representative verbalized consent to the visit recording.  I have personally performed the services described in this document as transcribed by the above individual, and it is both accurate and complete.

## 2023-08-05 LAB
ALBUMIN SERPL-MCNC: 4.4 G/DL (ref 3.5–5.2)
ALBUMIN/GLOB SERPL: 1.2 G/DL
ALP SERPL-CCNC: 55 U/L (ref 39–117)
ALT SERPL W P-5'-P-CCNC: 8 U/L (ref 1–33)
ANION GAP SERPL CALCULATED.3IONS-SCNC: 8.8 MMOL/L (ref 5–15)
AST SERPL-CCNC: 25 U/L (ref 1–32)
BILIRUB SERPL-MCNC: 0.3 MG/DL (ref 0–1.2)
BUN SERPL-MCNC: 8 MG/DL (ref 6–20)
BUN/CREAT SERPL: 11.9 (ref 7–25)
CALCIUM SPEC-SCNC: 9.6 MG/DL (ref 8.6–10.5)
CHLORIDE SERPL-SCNC: 102 MMOL/L (ref 98–107)
CO2 SERPL-SCNC: 25.2 MMOL/L (ref 22–29)
CREAT SERPL-MCNC: 0.67 MG/DL (ref 0.57–1)
EGFRCR SERPLBLD CKD-EPI 2021: 108.6 ML/MIN/1.73
GLOBULIN UR ELPH-MCNC: 3.6 GM/DL
GLUCOSE SERPL-MCNC: 79 MG/DL (ref 65–99)
POTASSIUM SERPL-SCNC: 4.1 MMOL/L (ref 3.5–5.2)
PROT SERPL-MCNC: 8 G/DL (ref 6–8.5)
SODIUM SERPL-SCNC: 136 MMOL/L (ref 136–145)
T4 FREE SERPL-MCNC: 1.16 NG/DL (ref 0.93–1.7)
TSH SERPL DL<=0.05 MIU/L-ACNC: 3.65 UIU/ML (ref 0.27–4.2)

## 2023-08-06 LAB — BACTERIA SPEC AEROBE CULT: NO GROWTH

## 2023-08-07 ENCOUNTER — REFERRAL TRIAGE (OUTPATIENT)
Dept: CASE MANAGEMENT | Facility: OTHER | Age: 48
End: 2023-08-07
Payer: COMMERCIAL

## 2023-08-07 ENCOUNTER — HOSPITAL ENCOUNTER (EMERGENCY)
Facility: HOSPITAL | Age: 48
Discharge: HOME | End: 2023-08-07
Attending: STUDENT IN AN ORGANIZED HEALTH CARE EDUCATION/TRAINING PROGRAM | Admitting: STUDENT IN AN ORGANIZED HEALTH CARE EDUCATION/TRAINING PROGRAM
Payer: COMMERCIAL

## 2023-08-07 VITALS
RESPIRATION RATE: 16 BRPM | HEIGHT: 67 IN | HEART RATE: 76 BPM | BODY MASS INDEX: 27.47 KG/M2 | WEIGHT: 175 LBS | DIASTOLIC BLOOD PRESSURE: 87 MMHG | TEMPERATURE: 98.2 F | OXYGEN SATURATION: 96 % | SYSTOLIC BLOOD PRESSURE: 121 MMHG

## 2023-08-07 DIAGNOSIS — L02.419 ARMPIT ABSCESS: Primary | ICD-10-CM

## 2023-08-07 LAB
ALBUMIN SERPL-MCNC: 4 G/DL (ref 3.5–5.2)
ALBUMIN/GLOB SERPL: 1 G/DL
ALP SERPL-CCNC: 58 U/L (ref 39–117)
ALT SERPL W P-5'-P-CCNC: 6 U/L (ref 1–33)
ANION GAP SERPL CALCULATED.3IONS-SCNC: 9.4 MMOL/L (ref 5–15)
AST SERPL-CCNC: 20 U/L (ref 1–32)
BASOPHILS # BLD AUTO: 0.05 10*3/MM3 (ref 0–0.2)
BASOPHILS NFR BLD AUTO: 0.7 % (ref 0–1.5)
BILIRUB SERPL-MCNC: <0.2 MG/DL (ref 0–1.2)
BUN SERPL-MCNC: 10 MG/DL (ref 6–20)
BUN/CREAT SERPL: 13.7 (ref 7–25)
CALCIUM SPEC-SCNC: 9.8 MG/DL (ref 8.6–10.5)
CHLORIDE SERPL-SCNC: 107 MMOL/L (ref 98–107)
CO2 SERPL-SCNC: 23.6 MMOL/L (ref 22–29)
CREAT SERPL-MCNC: 0.73 MG/DL (ref 0.57–1)
CRP SERPL-MCNC: 2.37 MG/DL (ref 0–0.5)
DEPRECATED RDW RBC AUTO: 49.4 FL (ref 37–54)
EGFRCR SERPLBLD CKD-EPI 2021: 102.2 ML/MIN/1.73
EOSINOPHIL # BLD AUTO: 0.39 10*3/MM3 (ref 0–0.4)
EOSINOPHIL NFR BLD AUTO: 5.4 % (ref 0.3–6.2)
ERYTHROCYTE [DISTWIDTH] IN BLOOD BY AUTOMATED COUNT: 15.2 % (ref 12.3–15.4)
ERYTHROCYTE [SEDIMENTATION RATE] IN BLOOD: 41 MM/HR (ref 0–20)
GLOBULIN UR ELPH-MCNC: 4 GM/DL
GLUCOSE SERPL-MCNC: 85 MG/DL (ref 65–99)
HCG SERPL QL: NEGATIVE
HCT VFR BLD AUTO: 41.5 % (ref 34–46.6)
HGB BLD-MCNC: 13.4 G/DL (ref 12–15.9)
HOLD SPECIMEN: NORMAL
IMM GRANULOCYTES # BLD AUTO: 0.02 10*3/MM3 (ref 0–0.05)
IMM GRANULOCYTES NFR BLD AUTO: 0.3 % (ref 0–0.5)
LYMPHOCYTES # BLD AUTO: 2.62 10*3/MM3 (ref 0.7–3.1)
LYMPHOCYTES NFR BLD AUTO: 36.3 % (ref 19.6–45.3)
MCH RBC QN AUTO: 28.4 PG (ref 26.6–33)
MCHC RBC AUTO-ENTMCNC: 32.3 G/DL (ref 31.5–35.7)
MCV RBC AUTO: 87.9 FL (ref 79–97)
MONOCYTES # BLD AUTO: 0.65 10*3/MM3 (ref 0.1–0.9)
MONOCYTES NFR BLD AUTO: 9 % (ref 5–12)
NEUTROPHILS NFR BLD AUTO: 3.49 10*3/MM3 (ref 1.7–7)
NEUTROPHILS NFR BLD AUTO: 48.3 % (ref 42.7–76)
NRBC BLD AUTO-RTO: 0 /100 WBC (ref 0–0.2)
PLATELET # BLD AUTO: 255 10*3/MM3 (ref 140–450)
PMV BLD AUTO: 9.4 FL (ref 6–12)
POTASSIUM SERPL-SCNC: 4 MMOL/L (ref 3.5–5.2)
PROT SERPL-MCNC: 8 G/DL (ref 6–8.5)
RBC # BLD AUTO: 4.72 10*6/MM3 (ref 3.77–5.28)
SODIUM SERPL-SCNC: 140 MMOL/L (ref 136–145)
WBC NRBC COR # BLD: 7.22 10*3/MM3 (ref 3.4–10.8)
WHOLE BLOOD HOLD COAG: NORMAL
WHOLE BLOOD HOLD SPECIMEN: NORMAL

## 2023-08-07 PROCEDURE — 87186 SC STD MICRODIL/AGAR DIL: CPT | Performed by: NURSE PRACTITIONER

## 2023-08-07 PROCEDURE — 87147 CULTURE TYPE IMMUNOLOGIC: CPT | Performed by: NURSE PRACTITIONER

## 2023-08-07 PROCEDURE — 86140 C-REACTIVE PROTEIN: CPT | Performed by: NURSE PRACTITIONER

## 2023-08-07 PROCEDURE — 36415 COLL VENOUS BLD VENIPUNCTURE: CPT

## 2023-08-07 PROCEDURE — 85025 COMPLETE CBC W/AUTO DIFF WBC: CPT | Performed by: NURSE PRACTITIONER

## 2023-08-07 PROCEDURE — 87205 SMEAR GRAM STAIN: CPT | Performed by: NURSE PRACTITIONER

## 2023-08-07 PROCEDURE — 99283 EMERGENCY DEPT VISIT LOW MDM: CPT

## 2023-08-07 PROCEDURE — 80053 COMPREHEN METABOLIC PANEL: CPT | Performed by: NURSE PRACTITIONER

## 2023-08-07 PROCEDURE — 84703 CHORIONIC GONADOTROPIN ASSAY: CPT | Performed by: NURSE PRACTITIONER

## 2023-08-07 PROCEDURE — 87070 CULTURE OTHR SPECIMN AEROBIC: CPT | Performed by: NURSE PRACTITIONER

## 2023-08-07 PROCEDURE — 85652 RBC SED RATE AUTOMATED: CPT | Performed by: NURSE PRACTITIONER

## 2023-08-07 RX ORDER — AMOXICILLIN AND CLAVULANATE POTASSIUM 875; 125 MG/1; MG/1
1 TABLET, FILM COATED ORAL ONCE
Status: COMPLETED | OUTPATIENT
Start: 2023-08-07 | End: 2023-08-07

## 2023-08-07 RX ORDER — LIDOCAINE HYDROCHLORIDE 10 MG/ML
10 INJECTION, SOLUTION EPIDURAL; INFILTRATION; INTRACAUDAL; PERINEURAL ONCE
Status: COMPLETED | OUTPATIENT
Start: 2023-08-07 | End: 2023-08-07

## 2023-08-07 RX ORDER — NALOXONE HYDROCHLORIDE 4 MG/.1ML
SPRAY NASAL
Qty: 2 EACH | Refills: 0 | Status: SHIPPED | OUTPATIENT
Start: 2023-08-07

## 2023-08-07 RX ORDER — OXYCODONE AND ACETAMINOPHEN 10; 325 MG/1; MG/1
1 TABLET ORAL ONCE
Status: COMPLETED | OUTPATIENT
Start: 2023-08-07 | End: 2023-08-07

## 2023-08-07 RX ORDER — HYDROCODONE BITARTRATE AND ACETAMINOPHEN 5; 325 MG/1; MG/1
1 TABLET ORAL EVERY 8 HOURS PRN
Qty: 9 TABLET | Refills: 0 | Status: SHIPPED | OUTPATIENT
Start: 2023-08-07 | End: 2023-08-10

## 2023-08-07 RX ORDER — AMOXICILLIN AND CLAVULANATE POTASSIUM 875; 125 MG/1; MG/1
1 TABLET, FILM COATED ORAL 2 TIMES DAILY
Qty: 20 TABLET | Refills: 0 | Status: SHIPPED | OUTPATIENT
Start: 2023-08-07 | End: 2023-08-17

## 2023-08-07 RX ADMIN — OXYCODONE AND ACETAMINOPHEN 1 TABLET: 10; 325 TABLET ORAL at 22:31

## 2023-08-07 RX ADMIN — LIDOCAINE HYDROCHLORIDE 10 ML: 10 INJECTION, SOLUTION EPIDURAL; INFILTRATION; INTRACAUDAL; PERINEURAL at 22:31

## 2023-08-07 RX ADMIN — AMOXICILLIN AND CLAVULANATE POTASSIUM 1 TABLET: 875; 125 TABLET, FILM COATED ORAL at 23:03

## 2023-08-07 RX ADMIN — OXYCODONE HYDROCHLORIDE AND ACETAMINOPHEN 1 TABLET: 10; 325 TABLET ORAL at 23:10

## 2023-08-07 NOTE — Clinical Note
[facility] EMERGENCY DEPARTMENT  [address] [address]  Phone: [phone]    Meghan Rivas was seen and treated in our emergency department on 8/7/2023.  She may return to work on 08/09/2023.         Thank you for choosing [facility].    Daisy Del Angel DO

## 2023-08-08 NOTE — ED NOTES
MEDICAL SCREENING:    Reason for Visit: Abscess     Patient initially seen in triage.  The patient was advised further evaluation and diagnostic testing will be needed, some of the treatment and testing will be initiated in the lobby in order to begin the process.  The patient will be returned to the waiting area for the time being and possibly be re-assessed by a subsequent ED provider.  The patient will be brought back to the treatment area in as timely manner as possible.     Carlee Huang, APRN  08/07/23 2102

## 2023-08-08 NOTE — ED PROVIDER NOTES
Subjective   History of Present Illness  Patient is a 47-year-old female with significant past medical history positive for anxiety, depression, GERD, hepatitis C, history of drug abuse presenting to the ER complaints of abscess in her right armpit.  Patient reports she noticed it a while ago and it has just become worse and more painful.  Patient denies fever, cough, nausea, vomiting, shortness of air or any additional symptoms today.  Patient denies any alleviating or worsening factors.     History provided by:  Patient   used: No      Review of Systems   Constitutional: Negative.  Negative for fever.   HENT: Negative.     Respiratory: Negative.     Cardiovascular: Negative.  Negative for chest pain.   Gastrointestinal: Negative.  Negative for abdominal pain.   Endocrine: Negative.    Genitourinary: Negative.  Negative for dysuria.   Skin:  Positive for wound.        Abscess to right armpit   Neurological: Negative.    Psychiatric/Behavioral: Negative.     All other systems reviewed and are negative.    Past Medical History:   Diagnosis Date    Anxiety     Depression     Disease of thyroid gland     GERD (gastroesophageal reflux disease)     Hepatitis C     History of drug abuse     Hypertension     Seizures     LAST SEIZURE 2019       Allergies   Allergen Reactions    Contrast Dye (Echo Or Unknown Ct/Mr) Anaphylaxis     Tolerated IV contrast CT 3/7/2022       Past Surgical History:   Procedure Laterality Date    APPENDECTOMY N/A 3/8/2022    Procedure: APPENDECTOMY LAPAROSCOPIC;  Surgeon: Babar Perkins MD;  Location: [facility] OR;  Service: General;  Laterality: N/A;    COLONOSCOPY N/A 10/5/2022    Procedure: COLONOSCOPY;  Surgeon: Neris Bear MD;  Location: [facility] OR;  Service: Gastroenterology;  Laterality: N/A;    OVARIAN CYST SURGERY      x7 surgeries       Family History   Adopted: Yes   Problem Relation Age of Onset    No Known Problems Adoptive Father     Behavior  problems Adoptive Mother        Social History     Socioeconomic History    Marital status:    Tobacco Use    Smoking status: Every Day     Packs/day: 0.25     Years: 3.50     Pack years: 0.88     Types: Cigarettes    Smokeless tobacco: Never   Vaping Use    Vaping Use: Former   Substance and Sexual Activity    Alcohol use: Not Currently     Comment: sober 4 years    Drug use: Not Currently     Types: IV, Heroin, Methamphetamines     Comment: clean 5 years    Sexual activity: Yes     Partners: Male           Objective   Physical Exam  Vitals and nursing note reviewed.   Constitutional:       General: She is not in acute distress.     Appearance: She is well-developed. She is not diaphoretic.   HENT:      Head: Normocephalic and atraumatic.      Right Ear: External ear normal.      Left Ear: External ear normal.      Nose: Nose normal.   Eyes:      Conjunctiva/sclera: Conjunctivae normal.      Pupils: Pupils are equal, round, and reactive to light.   Neck:      Vascular: No JVD.      Trachea: No tracheal deviation.   Cardiovascular:      Rate and Rhythm: Normal rate and regular rhythm.      Heart sounds: Normal heart sounds. No murmur heard.  Pulmonary:      Effort: Pulmonary effort is normal. No respiratory distress.      Breath sounds: Normal breath sounds. No wheezing.   Abdominal:      General: Bowel sounds are normal.      Palpations: Abdomen is soft.      Tenderness: There is no abdominal tenderness.   Musculoskeletal:         General: No deformity. Normal range of motion.      Cervical back: Normal range of motion and neck supple.   Skin:     General: Skin is warm and dry.      Coloration: Skin is not pale.      Findings: Abscess and erythema present. No rash.      Comments: 4 cm abscess to right arm.   Neurological:      Mental Status: She is alert and oriented to person, place, and time.      Cranial Nerves: No cranial nerve deficit.   Psychiatric:         Behavior: Behavior normal.         Thought  Content: Thought content normal.       Incision & Drainage    Date/Time: 8/7/2023 11:01 PM  Performed by: Carlee Huang APRN  Authorized by: Daisy Del Angel DO     Consent:     Consent obtained:  Verbal    Consent given by:  Patient    Risks, benefits, and alternatives were discussed: yes      Risks discussed:  Bleeding, damage to other organs, infection, incomplete drainage and pain    Alternatives discussed:  No treatment, delayed treatment, alternative treatment, observation and referral  Universal protocol:     Procedure explained and questions answered to patient or proxy's satisfaction: yes      Relevant documents present and verified: yes      Test results available : yes      Imaging studies available: yes      Required blood products, implants, devices, and special equipment available: yes      Site/side marked: yes      Immediately prior to procedure, a time out was called: yes      Patient identity confirmed:  Verbally with patient and arm band  Location:     Type:  Abscess    Size:  3    Location:  Upper extremity    Upper extremity location:  Arm    Arm location:  R upper arm  Pre-procedure details:     Skin preparation:  Povidone-iodine  Sedation:     Sedation type:  None  Anesthesia:     Anesthesia method:  Local infiltration    Local anesthetic:  Lidocaine 1% w/o epi  Procedure type:     Complexity:  Simple  Procedure details:     Ultrasound guidance: no      Needle aspiration: no      Incision types:  Single straight    Incision depth:  Dermal    Wound management:  Probed and deloculated    Drainage:  Purulent    Drainage amount:  Moderate    Wound treatment:  Wound left open    Packing materials:  None  Post-procedure details:     Procedure completion:  Tolerated well, no immediate complications           ED Course  ED Course as of 08/07/23 2303   Mon Aug 07, 2023   2248 Sed Rate(!): 41 [SM]   2248 WBC: 7.22 [SM]      ED Course User Index  [] Carlee Huang APRN                                            Medical Decision Making  Patient is a 47-year-old female with significant past medical history positive for anxiety, depression, GERD, hepatitis C, history of drug abuse presenting to the ER complaints of abscess in her right armpit.  Patient reports she noticed it a while ago and it has just become worse and more painful.  Patient denies fever, cough, nausea, vomiting, shortness of air or any additional symptoms today.  Patient denies any alleviating or worsening factors.     Problems Addressed:  Armpit abscess: complicated acute illness or injury    Amount and/or Complexity of Data Reviewed  Labs: ordered. Decision-making details documented in ED Course.    Risk  Prescription drug management.        Final diagnoses:   Armpit abscess       ED Disposition  ED Disposition       ED Disposition   Discharge    Condition   Stable    Comment   --               Salome Li MD  [address] [place] [facility]  [phone]    Schedule an appointment as soon as possible for a visit in 2 days  For wound re-check         Medication List        New Prescriptions      amoxicillin-clavulanate 875-125 MG per tablet  Commonly known as: AUGMENTIN  Take 1 tablet by mouth 2 (Two) Times a Day for 10 days.     HYDROcodone-acetaminophen 5-325 MG per tablet  Commonly known as: NORCO  Take 1 tablet by mouth Every 8 (Eight) Hours As Needed for Severe Pain for up to 3 days.     naloxone 4 MG/0.1ML nasal spray  Commonly known as: NARCAN  Call 911. Don't prime. [place] in 1 nostril for overdose. Repeat in 2-3 minutes in other nostril if no or minimal breathing/responsiveness.               Where to Get Your Medications        These medications were sent to [id number]  [place], [place] - [address] [facility] [phone] [place] [phone]   [address] [facility] [address]      Phone: [phone]   amoxicillin-clavulanate 875-125 MG per tablet  HYDROcodone-acetaminophen 5-325 MG per tablet  naloxone 4  MG/0.1ML nasal spray            Carlee Huang, APRN  08/07/23 0458

## 2023-08-10 LAB
BACTERIA SPEC AEROBE CULT: ABNORMAL
GRAM STN SPEC: ABNORMAL
GRAM STN SPEC: ABNORMAL

## 2023-08-16 ENCOUNTER — PATIENT OUTREACH (OUTPATIENT)
Dept: CASE MANAGEMENT | Facility: OTHER | Age: 48
End: 2023-08-16
Payer: COMMERCIAL

## 2023-08-16 NOTE — OUTREACH NOTE
Patient Outreach    SW has had UTRx3. Pt is due to be D/c from social care program, since the attempts to engage and establish contact have been unsuccessful.     Kj PEREZ -   Ambulatory Case Management    8/16/2023, 13:57 EDT

## 2023-09-15 DIAGNOSIS — F40.10 SOCIAL ANXIETY DISORDER: ICD-10-CM

## 2023-09-15 DIAGNOSIS — F41.1 GAD (GENERALIZED ANXIETY DISORDER): ICD-10-CM

## 2023-09-15 DIAGNOSIS — F43.12 CHRONIC POST-TRAUMATIC STRESS DISORDER (PTSD): ICD-10-CM

## 2023-09-18 RX ORDER — VENLAFAXINE HYDROCHLORIDE 150 MG/1
150 CAPSULE, EXTENDED RELEASE ORAL DAILY
Qty: 30 CAPSULE | Refills: 0 | Status: SHIPPED | OUTPATIENT
Start: 2023-09-18

## 2023-10-21 DIAGNOSIS — F40.10 SOCIAL ANXIETY DISORDER: ICD-10-CM

## 2023-10-21 DIAGNOSIS — F43.12 CHRONIC POST-TRAUMATIC STRESS DISORDER (PTSD): ICD-10-CM

## 2023-10-21 DIAGNOSIS — F41.1 GAD (GENERALIZED ANXIETY DISORDER): ICD-10-CM

## 2023-10-23 DIAGNOSIS — F40.10 SOCIAL ANXIETY DISORDER: ICD-10-CM

## 2023-10-23 DIAGNOSIS — F41.0 PANIC ATTACKS: ICD-10-CM

## 2023-10-23 DIAGNOSIS — F43.12 CHRONIC POST-TRAUMATIC STRESS DISORDER (PTSD): ICD-10-CM

## 2023-10-23 DIAGNOSIS — F41.1 GAD (GENERALIZED ANXIETY DISORDER): ICD-10-CM

## 2023-10-23 RX ORDER — VENLAFAXINE HYDROCHLORIDE 150 MG/1
150 CAPSULE, EXTENDED RELEASE ORAL DAILY
Qty: 30 CAPSULE | Refills: 0 | Status: SHIPPED | OUTPATIENT
Start: 2023-10-23 | End: 2023-10-23 | Stop reason: SDUPTHER

## 2023-10-24 RX ORDER — LORAZEPAM 0.5 MG/1
0.5 TABLET ORAL DAILY PRN
Qty: 30 TABLET | Refills: 2 | Status: SHIPPED | OUTPATIENT
Start: 2023-10-24

## 2023-10-24 RX ORDER — VENLAFAXINE HYDROCHLORIDE 150 MG/1
150 CAPSULE, EXTENDED RELEASE ORAL DAILY
Qty: 30 CAPSULE | Refills: 0 | Status: SHIPPED | OUTPATIENT
Start: 2023-10-24

## 2023-10-24 RX ORDER — LEVOTHYROXINE SODIUM 0.07 MG/1
75 TABLET ORAL DAILY
Qty: 90 TABLET | Refills: 0 | OUTPATIENT
Start: 2023-10-24

## 2023-10-25 ENCOUNTER — PRIOR AUTHORIZATION (OUTPATIENT)
Dept: PSYCHIATRY | Facility: CLINIC | Age: 48
End: 2023-10-25
Payer: COMMERCIAL

## 2023-10-25 NOTE — TELEPHONE ENCOUNTER
Prior authorization has been initiated on sure scripts today    Process  ePA Case  Status  In Progress   PA Information  Case Id  [id number]  Reference Id  [id number]    Lorazepam 0.5 mg

## 2023-11-06 ENCOUNTER — PRIOR AUTHORIZATION (OUTPATIENT)
Dept: PSYCHIATRY | Facility: CLINIC | Age: 48
End: 2023-11-06
Payer: COMMERCIAL

## 2023-11-06 NOTE — TELEPHONE ENCOUNTER
Outcome  Approvedtoday  The request has been approved. The authorization is effective from 11/06/2023 to 05/05/2024, as long as the member is enrolled in their current health plan. The request was approved as submitted. A written notification letter will follow with additional details.

## 2023-11-06 NOTE — TELEPHONE ENCOUNTER
Drug  LORazepam 0.5MG tablets  Form  [employer] [place] Medicaid ePA Form 2017 NCPDP    Key: [id number] - PA Case ID: [id number]

## 2023-11-15 ENCOUNTER — DOCUMENTATION (OUTPATIENT)
Dept: PSYCHIATRY | Facility: CLINIC | Age: 48
End: 2023-11-15
Payer: COMMERCIAL

## 2023-11-15 NOTE — PROGRESS NOTES
Patient called requesting PA be done on Abilify  that was sent in 05/01/23. Under advisement called pharmacy and changed dx code to F33.2 to allow insurance to cover medication. Will check back with patient to see if she was able ti fill.

## 2023-11-17 DIAGNOSIS — F41.1 GAD (GENERALIZED ANXIETY DISORDER): ICD-10-CM

## 2023-11-17 DIAGNOSIS — F40.10 SOCIAL ANXIETY DISORDER: ICD-10-CM

## 2023-11-17 DIAGNOSIS — F43.12 CHRONIC POST-TRAUMATIC STRESS DISORDER (PTSD): ICD-10-CM

## 2023-11-17 RX ORDER — VENLAFAXINE HYDROCHLORIDE 150 MG/1
150 CAPSULE, EXTENDED RELEASE ORAL DAILY
Qty: 30 CAPSULE | Refills: 0 | OUTPATIENT
Start: 2023-11-17

## 2023-12-07 DIAGNOSIS — F41.1 GAD (GENERALIZED ANXIETY DISORDER): ICD-10-CM

## 2023-12-07 DIAGNOSIS — F40.10 SOCIAL ANXIETY DISORDER: ICD-10-CM

## 2023-12-08 RX ORDER — PROPRANOLOL HYDROCHLORIDE 20 MG/1
TABLET ORAL
Qty: 60 TABLET | Refills: 0 | Status: SHIPPED | OUTPATIENT
Start: 2023-12-08

## 2023-12-12 DIAGNOSIS — F43.12 CHRONIC POST-TRAUMATIC STRESS DISORDER (PTSD): ICD-10-CM

## 2023-12-12 DIAGNOSIS — F40.10 SOCIAL ANXIETY DISORDER: ICD-10-CM

## 2023-12-12 DIAGNOSIS — F41.1 GAD (GENERALIZED ANXIETY DISORDER): ICD-10-CM

## 2023-12-12 RX ORDER — LEVOTHYROXINE SODIUM 0.07 MG/1
75 TABLET ORAL DAILY
Qty: 90 TABLET | Refills: 0 | Status: SHIPPED | OUTPATIENT
Start: 2023-12-12

## 2023-12-12 RX ORDER — ARIPIPRAZOLE 5 MG/1
5 TABLET ORAL NIGHTLY
Qty: 30 TABLET | Refills: 2 | Status: SHIPPED | OUTPATIENT
Start: 2023-12-12

## 2023-12-12 RX ORDER — VENLAFAXINE HYDROCHLORIDE 150 MG/1
150 CAPSULE, EXTENDED RELEASE ORAL DAILY
Qty: 30 CAPSULE | Refills: 0 | Status: SHIPPED | OUTPATIENT
Start: 2023-12-12

## 2023-12-12 RX ORDER — PROPRANOLOL HYDROCHLORIDE 20 MG/1
TABLET ORAL
Qty: 60 TABLET | Refills: 0 | OUTPATIENT
Start: 2023-12-12

## 2024-01-08 DIAGNOSIS — F40.10 SOCIAL ANXIETY DISORDER: ICD-10-CM

## 2024-01-08 DIAGNOSIS — F43.12 CHRONIC POST-TRAUMATIC STRESS DISORDER (PTSD): ICD-10-CM

## 2024-01-08 DIAGNOSIS — F41.1 GAD (GENERALIZED ANXIETY DISORDER): ICD-10-CM

## 2024-01-09 RX ORDER — VENLAFAXINE HYDROCHLORIDE 150 MG/1
150 CAPSULE, EXTENDED RELEASE ORAL DAILY
Qty: 30 CAPSULE | Refills: 0 | Status: SHIPPED | OUTPATIENT
Start: 2024-01-09

## 2024-02-05 ENCOUNTER — PRIOR AUTHORIZATION (OUTPATIENT)
Dept: PSYCHIATRY | Facility: CLINIC | Age: 49
End: 2024-02-05

## 2024-02-05 ENCOUNTER — OFFICE VISIT (OUTPATIENT)
Dept: PSYCHIATRY | Facility: CLINIC | Age: 49
End: 2024-02-05
Payer: COMMERCIAL

## 2024-02-05 VITALS
OXYGEN SATURATION: 99 % | WEIGHT: 187.4 LBS | HEART RATE: 58 BPM | SYSTOLIC BLOOD PRESSURE: 122 MMHG | DIASTOLIC BLOOD PRESSURE: 80 MMHG | BODY MASS INDEX: 29.41 KG/M2 | HEIGHT: 67 IN

## 2024-02-05 DIAGNOSIS — Z79.899 MEDICATION MANAGEMENT: ICD-10-CM

## 2024-02-05 DIAGNOSIS — F41.0 PANIC ATTACKS: ICD-10-CM

## 2024-02-05 DIAGNOSIS — F12.10 CANNABIS ABUSE, EPISODIC: ICD-10-CM

## 2024-02-05 DIAGNOSIS — F17.200 NICOTINE USE DISORDER: ICD-10-CM

## 2024-02-05 DIAGNOSIS — F43.12 CHRONIC POST-TRAUMATIC STRESS DISORDER (PTSD): Primary | ICD-10-CM

## 2024-02-05 DIAGNOSIS — F41.1 GAD (GENERALIZED ANXIETY DISORDER): ICD-10-CM

## 2024-02-05 DIAGNOSIS — F40.10 SOCIAL ANXIETY DISORDER: ICD-10-CM

## 2024-02-05 LAB
EXTERNAL AMPHETAMINE SCREEN URINE: POSITIVE
EXTERNAL BENZODIAZEPINE SCREEN URINE: NEGATIVE
EXTERNAL BUPRENORPHINE SCREEN URINE: POSITIVE
EXTERNAL COCAINE SCREEN URINE: NEGATIVE
EXTERNAL MDMA: NEGATIVE
EXTERNAL METHADONE SCREEN URINE: NEGATIVE
EXTERNAL METHAMPHETAMINE SCREEN URINE: NEGATIVE
EXTERNAL OPIATES SCREEN URINE: NEGATIVE
EXTERNAL OXYCODONE SCREEN URINE: NEGATIVE
EXTERNAL THC SCREEN URINE: POSITIVE

## 2024-02-05 PROCEDURE — 1159F MED LIST DOCD IN RCRD: CPT | Performed by: PSYCHIATRY & NEUROLOGY

## 2024-02-05 PROCEDURE — 1160F RVW MEDS BY RX/DR IN RCRD: CPT | Performed by: PSYCHIATRY & NEUROLOGY

## 2024-02-05 PROCEDURE — 99214 OFFICE O/P EST MOD 30 MIN: CPT | Performed by: PSYCHIATRY & NEUROLOGY

## 2024-02-05 RX ORDER — ARIPIPRAZOLE 10 MG/1
10 TABLET ORAL NIGHTLY
Qty: 30 TABLET | Refills: 2 | Status: SHIPPED | OUTPATIENT
Start: 2024-02-05

## 2024-02-05 RX ORDER — LORAZEPAM 1 MG/1
1 TABLET ORAL DAILY PRN
Qty: 30 TABLET | Refills: 2 | Status: SHIPPED | OUTPATIENT
Start: 2024-02-05

## 2024-02-05 RX ORDER — ONDANSETRON 4 MG/1
2 TABLET, FILM COATED ORAL EVERY 12 HOURS SCHEDULED
COMMUNITY
Start: 2023-11-13

## 2024-02-05 RX ORDER — VENLAFAXINE HYDROCHLORIDE 150 MG/1
150 CAPSULE, EXTENDED RELEASE ORAL DAILY
Qty: 30 CAPSULE | Refills: 2 | Status: SHIPPED | OUTPATIENT
Start: 2024-02-05

## 2024-02-05 RX ORDER — PROPRANOLOL HYDROCHLORIDE 20 MG/1
TABLET ORAL
Qty: 60 TABLET | Refills: 2 | Status: SHIPPED | OUTPATIENT
Start: 2024-02-05
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
## 2024-02-05 NOTE — TELEPHONE ENCOUNTER
Meghan Rivas (Key: [id number])  PA Case ID #: [id number]  Rx #: [id number]    Approved today  The request has been approved. The authorization is effective from 02/05/2024 to 02/04/2025, as long as the member is enrolled in their current health plan. The request was approved as submitted. A written notification letter will follow with additional details.  Authorization Expiration Date: 2/3/2025  Drug  ARIPiprazole 10MG tablets

## 2024-03-07 RX ORDER — LEVOTHYROXINE SODIUM 0.07 MG/1
75 TABLET ORAL DAILY
Qty: 90 TABLET | Refills: 0 | OUTPATIENT
Start: 2024-03-07

## 2024-03-25 ENCOUNTER — OFFICE VISIT (OUTPATIENT)
Dept: PSYCHIATRY | Facility: CLINIC | Age: 49
End: 2024-03-25
Payer: COMMERCIAL

## 2024-03-25 VITALS
WEIGHT: 189.2 LBS | OXYGEN SATURATION: 98 % | HEIGHT: 67 IN | BODY MASS INDEX: 29.7 KG/M2 | DIASTOLIC BLOOD PRESSURE: 72 MMHG | SYSTOLIC BLOOD PRESSURE: 118 MMHG | HEART RATE: 92 BPM

## 2024-03-25 DIAGNOSIS — F51.5 NIGHTMARES ASSOCIATED WITH CHRONIC POST-TRAUMATIC STRESS DISORDER: ICD-10-CM

## 2024-03-25 DIAGNOSIS — F43.12 CHRONIC POST-TRAUMATIC STRESS DISORDER (PTSD): Primary | ICD-10-CM

## 2024-03-25 DIAGNOSIS — F17.200 NICOTINE USE DISORDER: ICD-10-CM

## 2024-03-25 DIAGNOSIS — F12.10 CANNABIS ABUSE, EPISODIC: ICD-10-CM

## 2024-03-25 DIAGNOSIS — F43.12 NIGHTMARES ASSOCIATED WITH CHRONIC POST-TRAUMATIC STRESS DISORDER: ICD-10-CM

## 2024-03-25 DIAGNOSIS — F41.0 PANIC ATTACKS: ICD-10-CM

## 2024-03-25 DIAGNOSIS — F41.1 GAD (GENERALIZED ANXIETY DISORDER): ICD-10-CM

## 2024-03-25 RX ORDER — CLONAZEPAM 0.5 MG/1
0.5 TABLET ORAL DAILY PRN
Qty: 30 TABLET | Refills: 2 | Status: SHIPPED | OUTPATIENT
Start: 2024-03-25
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
## 2024-05-14 ENCOUNTER — HOSPITAL ENCOUNTER (EMERGENCY)
Facility: HOSPITAL | Age: 49
Discharge: HOME | End: 2024-05-14
Attending: STUDENT IN AN ORGANIZED HEALTH CARE EDUCATION/TRAINING PROGRAM | Admitting: STUDENT IN AN ORGANIZED HEALTH CARE EDUCATION/TRAINING PROGRAM
Payer: COMMERCIAL

## 2024-05-14 ENCOUNTER — APPOINTMENT (OUTPATIENT)
Dept: GENERAL RADIOLOGY | Facility: HOSPITAL | Age: 49
End: 2024-05-14
Payer: COMMERCIAL

## 2024-05-14 ENCOUNTER — APPOINTMENT (OUTPATIENT)
Dept: ULTRASOUND IMAGING | Facility: HOSPITAL | Age: 49
End: 2024-05-14
Payer: COMMERCIAL

## 2024-05-14 ENCOUNTER — APPOINTMENT (OUTPATIENT)
Dept: CT IMAGING | Facility: HOSPITAL | Age: 49
End: 2024-05-14
Payer: COMMERCIAL

## 2024-05-14 VITALS
RESPIRATION RATE: 20 BRPM | OXYGEN SATURATION: 97 % | BODY MASS INDEX: 27.94 KG/M2 | DIASTOLIC BLOOD PRESSURE: 83 MMHG | WEIGHT: 178 LBS | HEIGHT: 67 IN | TEMPERATURE: 98.2 F | HEART RATE: 63 BPM | SYSTOLIC BLOOD PRESSURE: 131 MMHG

## 2024-05-14 DIAGNOSIS — N83.209 RUPTURED OVARIAN CYST: Primary | ICD-10-CM

## 2024-05-14 LAB
ALBUMIN SERPL-MCNC: 4.2 G/DL (ref 3.5–5.2)
ALBUMIN/GLOB SERPL: 0.9 G/DL
ALP SERPL-CCNC: 57 U/L (ref 39–117)
ALT SERPL W P-5'-P-CCNC: 11 U/L (ref 1–33)
AMYLASE SERPL-CCNC: 54 U/L (ref 28–100)
ANION GAP SERPL CALCULATED.3IONS-SCNC: 10.6 MMOL/L (ref 5–15)
APTT PPP: 29.5 SECONDS (ref 26.5–34.5)
AST SERPL-CCNC: 42 U/L (ref 1–32)
BACTERIA UR QL AUTO: ABNORMAL /HPF
BASOPHILS # BLD AUTO: 0.04 10*3/MM3 (ref 0–0.2)
BASOPHILS NFR BLD AUTO: 0.9 % (ref 0–1.5)
BILIRUB SERPL-MCNC: 0.2 MG/DL (ref 0–1.2)
BILIRUB UR QL STRIP: NEGATIVE
BUN SERPL-MCNC: 13 MG/DL (ref 6–20)
BUN/CREAT SERPL: 20.3 (ref 7–25)
CALCIUM SPEC-SCNC: 9.4 MG/DL (ref 8.6–10.5)
CHLORIDE SERPL-SCNC: 104 MMOL/L (ref 98–107)
CLARITY UR: CLEAR
CO2 SERPL-SCNC: 23.4 MMOL/L (ref 22–29)
COLOR UR: YELLOW
CREAT SERPL-MCNC: 0.64 MG/DL (ref 0.57–1)
CRP SERPL-MCNC: 0.44 MG/DL (ref 0–0.5)
D DIMER PPP FEU-MCNC: 0.61 MCGFEU/ML (ref 0–0.5)
D-LACTATE SERPL-SCNC: 0.6 MMOL/L (ref 0.5–2)
DEPRECATED RDW RBC AUTO: 48.2 FL (ref 37–54)
EGFRCR SERPLBLD CKD-EPI 2021: 109.2 ML/MIN/1.73
EOSINOPHIL # BLD AUTO: 0.08 10*3/MM3 (ref 0–0.4)
EOSINOPHIL NFR BLD AUTO: 1.8 % (ref 0.3–6.2)
ERYTHROCYTE [DISTWIDTH] IN BLOOD BY AUTOMATED COUNT: 15.1 % (ref 12.3–15.4)
GLOBULIN UR ELPH-MCNC: 4.7 GM/DL
GLUCOSE SERPL-MCNC: 95 MG/DL (ref 65–99)
GLUCOSE UR STRIP-MCNC: NEGATIVE MG/DL
HCG SERPL QL: NEGATIVE
HCT VFR BLD AUTO: 39.1 % (ref 34–46.6)
HGB BLD-MCNC: 12.9 G/DL (ref 12–15.9)
HGB UR QL STRIP.AUTO: ABNORMAL
HOLD SPECIMEN: NORMAL
HOLD SPECIMEN: NORMAL
HYALINE CASTS UR QL AUTO: ABNORMAL /LPF
IMM GRANULOCYTES # BLD AUTO: 0 10*3/MM3 (ref 0–0.05)
IMM GRANULOCYTES NFR BLD AUTO: 0 % (ref 0–0.5)
INR PPP: 0.97 (ref 0.9–1.1)
KETONES UR QL STRIP: ABNORMAL
LEUKOCYTE ESTERASE UR QL STRIP.AUTO: NEGATIVE
LIPASE SERPL-CCNC: 19 U/L (ref 13–60)
LYMPHOCYTES # BLD AUTO: 2.14 10*3/MM3 (ref 0.7–3.1)
LYMPHOCYTES NFR BLD AUTO: 48.4 % (ref 19.6–45.3)
MAGNESIUM SERPL-MCNC: 1.7 MG/DL (ref 1.6–2.6)
MCH RBC QN AUTO: 28.7 PG (ref 26.6–33)
MCHC RBC AUTO-ENTMCNC: 33 G/DL (ref 31.5–35.7)
MCV RBC AUTO: 87.1 FL (ref 79–97)
MONOCYTES # BLD AUTO: 0.78 10*3/MM3 (ref 0.1–0.9)
MONOCYTES NFR BLD AUTO: 17.6 % (ref 5–12)
NEUTROPHILS NFR BLD AUTO: 1.38 10*3/MM3 (ref 1.7–7)
NEUTROPHILS NFR BLD AUTO: 31.3 % (ref 42.7–76)
NITRITE UR QL STRIP: NEGATIVE
NRBC BLD AUTO-RTO: 0 /100 WBC (ref 0–0.2)
PH UR STRIP.AUTO: 6.5 [PH] (ref 5–8)
PLATELET # BLD AUTO: 195 10*3/MM3 (ref 140–450)
PMV BLD AUTO: 9.7 FL (ref 6–12)
POTASSIUM SERPL-SCNC: 4.1 MMOL/L (ref 3.5–5.2)
PROT SERPL-MCNC: 8.9 G/DL (ref 6–8.5)
PROT UR QL STRIP: NEGATIVE
PROTHROMBIN TIME: 13 SECONDS (ref 12.1–14.7)
RBC # BLD AUTO: 4.49 10*6/MM3 (ref 3.77–5.28)
RBC # UR STRIP: ABNORMAL /HPF
REF LAB TEST METHOD: ABNORMAL
SODIUM SERPL-SCNC: 138 MMOL/L (ref 136–145)
SP GR UR STRIP: >1.03 (ref 1–1.03)
SQUAMOUS #/AREA URNS HPF: ABNORMAL /HPF
UROBILINOGEN UR QL STRIP: ABNORMAL
WBC # UR STRIP: ABNORMAL /HPF
WBC NRBC COR # BLD AUTO: 4.42 10*3/MM3 (ref 3.4–10.8)
WHOLE BLOOD HOLD COAG: NORMAL
WHOLE BLOOD HOLD SPECIMEN: NORMAL

## 2024-05-14 PROCEDURE — 84703 CHORIONIC GONADOTROPIN ASSAY: CPT | Performed by: PHYSICIAN ASSISTANT

## 2024-05-14 PROCEDURE — 83605 ASSAY OF LACTIC ACID: CPT | Performed by: PHYSICIAN ASSISTANT

## 2024-05-14 PROCEDURE — 74178 CT ABD&PLV WO CNTR FLWD CNTR: CPT

## 2024-05-14 PROCEDURE — 76830 TRANSVAGINAL US NON-OB: CPT

## 2024-05-14 PROCEDURE — 25810000003 SODIUM CHLORIDE 0.9 % SOLUTION: Performed by: PHYSICIAN ASSISTANT

## 2024-05-14 PROCEDURE — 85730 THROMBOPLASTIN TIME PARTIAL: CPT | Performed by: PHYSICIAN ASSISTANT

## 2024-05-14 PROCEDURE — C1751 CATH, INF, PER/CENT/MIDLINE: HCPCS

## 2024-05-14 PROCEDURE — 71045 X-RAY EXAM CHEST 1 VIEW: CPT

## 2024-05-14 PROCEDURE — 86140 C-REACTIVE PROTEIN: CPT | Performed by: PHYSICIAN ASSISTANT

## 2024-05-14 PROCEDURE — 81001 URINALYSIS AUTO W/SCOPE: CPT | Performed by: PHYSICIAN ASSISTANT

## 2024-05-14 PROCEDURE — 85025 COMPLETE CBC W/AUTO DIFF WBC: CPT | Performed by: PHYSICIAN ASSISTANT

## 2024-05-14 PROCEDURE — 82150 ASSAY OF AMYLASE: CPT | Performed by: PHYSICIAN ASSISTANT

## 2024-05-14 PROCEDURE — 76830 TRANSVAGINAL US NON-OB: CPT | Performed by: RADIOLOGY

## 2024-05-14 PROCEDURE — 99285 EMERGENCY DEPT VISIT HI MDM: CPT

## 2024-05-14 PROCEDURE — 71045 X-RAY EXAM CHEST 1 VIEW: CPT | Performed by: RADIOLOGY

## 2024-05-14 PROCEDURE — 71275 CT ANGIOGRAPHY CHEST: CPT

## 2024-05-14 PROCEDURE — 25010000002 KETOROLAC TROMETHAMINE PER 15 MG: Performed by: PHYSICIAN ASSISTANT

## 2024-05-14 PROCEDURE — 96375 TX/PRO/DX INJ NEW DRUG ADDON: CPT

## 2024-05-14 PROCEDURE — 74178 CT ABD&PLV WO CNTR FLWD CNTR: CPT | Performed by: RADIOLOGY

## 2024-05-14 PROCEDURE — 71275 CT ANGIOGRAPHY CHEST: CPT | Performed by: RADIOLOGY

## 2024-05-14 PROCEDURE — 96374 THER/PROPH/DIAG INJ IV PUSH: CPT

## 2024-05-14 PROCEDURE — 83735 ASSAY OF MAGNESIUM: CPT | Performed by: PHYSICIAN ASSISTANT

## 2024-05-14 PROCEDURE — 25010000002 PROCHLORPERAZINE 10 MG/2ML SOLUTION: Performed by: PHYSICIAN ASSISTANT

## 2024-05-14 PROCEDURE — 25510000001 IOPAMIDOL PER 1 ML: Performed by: STUDENT IN AN ORGANIZED HEALTH CARE EDUCATION/TRAINING PROGRAM

## 2024-05-14 PROCEDURE — 83690 ASSAY OF LIPASE: CPT | Performed by: PHYSICIAN ASSISTANT

## 2024-05-14 PROCEDURE — 80053 COMPREHEN METABOLIC PANEL: CPT | Performed by: PHYSICIAN ASSISTANT

## 2024-05-14 PROCEDURE — 85610 PROTHROMBIN TIME: CPT | Performed by: PHYSICIAN ASSISTANT

## 2024-05-14 PROCEDURE — 85379 FIBRIN DEGRADATION QUANT: CPT | Performed by: PHYSICIAN ASSISTANT

## 2024-05-14 PROCEDURE — 25010000002 METHYLPREDNISOLONE PER 125 MG: Performed by: PHYSICIAN ASSISTANT

## 2024-05-14 PROCEDURE — 25010000002 DIPHENHYDRAMINE PER 50 MG: Performed by: PHYSICIAN ASSISTANT

## 2024-05-14 RX ORDER — PROCHLORPERAZINE EDISYLATE 5 MG/ML
5 INJECTION INTRAMUSCULAR; INTRAVENOUS ONCE
Status: COMPLETED | OUTPATIENT
Start: 2024-05-14 | End: 2024-05-14

## 2024-05-14 RX ORDER — METHYLPREDNISOLONE SODIUM SUCCINATE 125 MG/2ML
125 INJECTION, POWDER, LYOPHILIZED, FOR SOLUTION INTRAMUSCULAR; INTRAVENOUS ONCE
Status: COMPLETED | OUTPATIENT
Start: 2024-05-14 | End: 2024-05-14

## 2024-05-14 RX ORDER — KETOROLAC TROMETHAMINE 30 MG/ML
30 INJECTION, SOLUTION INTRAMUSCULAR; INTRAVENOUS ONCE
Status: COMPLETED | OUTPATIENT
Start: 2024-05-14 | End: 2024-05-14

## 2024-05-14 RX ORDER — DIPHENHYDRAMINE HYDROCHLORIDE 50 MG/ML
50 INJECTION INTRAMUSCULAR; INTRAVENOUS ONCE
Status: COMPLETED | OUTPATIENT
Start: 2024-05-14 | End: 2024-05-14

## 2024-05-14 RX ORDER — NAPROXEN 500 MG/1
500 TABLET ORAL 2 TIMES DAILY PRN
Qty: 20 TABLET | Refills: 0 | Status: SHIPPED | OUTPATIENT
Start: 2024-05-14

## 2024-05-14 RX ORDER — SODIUM CHLORIDE 0.9 % (FLUSH) 0.9 %
10 SYRINGE (ML) INJECTION AS NEEDED
Status: DISCONTINUED | OUTPATIENT
Start: 2024-05-14 | End: 2024-05-14 | Stop reason: HOSPADM

## 2024-05-14 RX ORDER — FAMOTIDINE 10 MG/ML
20 INJECTION, SOLUTION INTRAVENOUS ONCE
Status: COMPLETED | OUTPATIENT
Start: 2024-05-14 | End: 2024-05-14

## 2024-05-14 RX ADMIN — IOPAMIDOL 92 ML: 755 INJECTION, SOLUTION INTRAVENOUS at 11:01

## 2024-05-14 RX ADMIN — KETOROLAC TROMETHAMINE 30 MG: 30 INJECTION, SOLUTION INTRAMUSCULAR; INTRAVENOUS at 08:58

## 2024-05-14 RX ADMIN — METHYLPREDNISOLONE SODIUM SUCCINATE 125 MG: 125 INJECTION, POWDER, FOR SOLUTION INTRAMUSCULAR; INTRAVENOUS at 10:46

## 2024-05-14 RX ADMIN — DIPHENHYDRAMINE HYDROCHLORIDE 50 MG: 50 INJECTION, SOLUTION INTRAMUSCULAR; INTRAVENOUS at 10:46

## 2024-05-14 RX ADMIN — FAMOTIDINE 20 MG: 10 INJECTION, SOLUTION INTRAVENOUS at 10:46

## 2024-05-14 RX ADMIN — SODIUM CHLORIDE 1000 ML: 9 INJECTION, SOLUTION INTRAVENOUS at 08:58

## 2024-05-14 RX ADMIN — PROCHLORPERAZINE EDISYLATE 5 MG: 5 INJECTION INTRAMUSCULAR; INTRAVENOUS at 08:58

## 2024-05-14 NOTE — ED PROVIDER NOTES
Subjective   History of Present Illness  48-year-old female who presents to the ED today for left flank pain.  She states this has been going on for about 3 days.  She states the pain has been intermittent.  It radiates into her left abdomen.  She states the pain is worse when she tries to get up or sit down or put a lot of force on her left leg.  She denies any fever.  She denies any urinary symptoms.  She denies any hematuria.  She states she does have a history of recurrent UTIs.  She states she had some nausea and vomiting yesterday.  She denies any diarrhea.  She reports that she has been more constipated.  She tried some ibuprofen, Zofran and Phenergan at home for her symptoms.  She states she does have a history of ovarian cysts and has required surgery for this several times in the past.  She states she also has a past history of PE and states her symptoms feel similar to her prior PE and ovarian cyst.    History provided by:  Patient  Flank Pain  Pain location:  L flank  Pain quality: aching    Pain radiates to:  LLQ  Pain severity:  Moderate  Onset quality:  Gradual  Duration:  3 days  Timing:  Intermittent  Progression:  Waxing and waning  Chronicity:  New  Context: not trauma    Relieved by:  Nothing  Worsened by:  Movement  Ineffective treatments:  NSAIDs  Associated symptoms: constipation, nausea and vomiting    Associated symptoms: no anorexia, no chest pain, no chills, no cough, no diarrhea, no dysuria, no fatigue, no fever, no hematemesis, no hematochezia, no hematuria, no melena, no shortness of breath, no sore throat and no vaginal discharge    Risk factors: multiple surgeries        Review of Systems   Constitutional: Negative.  Negative for chills, fatigue and fever.   HENT: Negative.  Negative for sore throat.    Eyes: Negative.    Respiratory: Negative.  Negative for cough and shortness of breath.    Cardiovascular:  Negative for chest pain.   Gastrointestinal:  Positive for abdominal pain,  constipation, nausea and vomiting. Negative for anorexia, diarrhea, hematemesis, hematochezia and melena.   Genitourinary:  Positive for flank pain. Negative for difficulty urinating, dysuria, hematuria and vaginal discharge.   Musculoskeletal:  Positive for back pain.   Skin: Negative.    Neurological: Negative.    Psychiatric/Behavioral: Negative.     All other systems reviewed and are negative.      Past Medical History:   Diagnosis Date    Anxiety     Depression     Disease of thyroid gland     GERD (gastroesophageal reflux disease)     Hepatitis C     History of drug abuse     Hypertension     Seizures     LAST SEIZURE 2019       Allergies   Allergen Reactions    Contrast Dye (Echo Or Unknown Ct/Mr) Anaphylaxis     Tolerated IV contrast CT 3/7/2022       Past Surgical History:   Procedure Laterality Date    APPENDECTOMY N/A 3/8/2022    Procedure: APPENDECTOMY LAPAROSCOPIC;  Surgeon: Babar Perkins MD;  Location: [facility];  Service: General;  Laterality: N/A;    COLONOSCOPY N/A 10/5/2022    Procedure: COLONOSCOPY;  Surgeon: Neris Bear MD;  Location: [facility] OR;  Service: Gastroenterology;  Laterality: N/A;    OVARIAN CYST SURGERY      x7 surgeries       Family History   Adopted: Yes   Problem Relation Age of Onset    No Known Problems Adoptive Father     Behavior problems Adoptive Mother        Social History     Socioeconomic History    Marital status:    Tobacco Use    Smoking status: Every Day     Current packs/day: 0.25     Average packs/day: 0.3 packs/day for 3.5 years (0.9 ttl pk-yrs)     Types: Cigarettes    Smokeless tobacco: Never   Vaping Use    Vaping status: Former   Substance and Sexual Activity    Alcohol use: Not Currently     Comment: sober 4 years    Drug use: Not Currently     Types: IV, Heroin, Methamphetamines     Comment: clean 5 years    Sexual activity: Yes     Partners: Male           Objective   Physical Exam  Vitals and nursing note reviewed.   Constitutional:        General: She is not in acute distress.     Appearance: Normal appearance. She is not diaphoretic.   HENT:      Head: Normocephalic and atraumatic.      Right Ear: External ear normal.      Left Ear: External ear normal.      Nose: Nose normal.   Eyes:      Conjunctiva/sclera: Conjunctivae normal.      Pupils: Pupils are equal, round, and reactive to light.   Cardiovascular:      Rate and Rhythm: Normal rate and regular rhythm.      Pulses: Normal pulses.      Heart sounds: Normal heart sounds.   Pulmonary:      Effort: Pulmonary effort is normal.      Breath sounds: Normal breath sounds.   Abdominal:      General: Bowel sounds are normal.      Palpations: Abdomen is soft.      Tenderness: There is abdominal tenderness (mild tenderness to left lower abdomen). There is no right CVA tenderness, left CVA tenderness, guarding or rebound.   Musculoskeletal:         General: Normal range of motion.      Cervical back: Normal range of motion and neck supple.   Skin:     General: Skin is warm and dry.      Capillary Refill: Capillary refill takes less than 2 seconds.   Neurological:      General: No focal deficit present.      Mental Status: She is alert and oriented to person, place, and time.   Psychiatric:         Mood and Affect: Mood normal.         Procedures       Results for orders placed or performed during the hospital encounter of 05/14/24   Comprehensive Metabolic Panel    Specimen: Blood   Result Value Ref Range    Glucose 95 65 - 99 mg/dL    BUN 13 6 - 20 mg/dL    Creatinine 0.64 0.57 - 1.00 mg/dL    Sodium 138 136 - 145 mmol/L    Potassium 4.1 3.5 - 5.2 mmol/L    Chloride 104 98 - 107 mmol/L    CO2 23.4 22.0 - 29.0 mmol/L    Calcium 9.4 8.6 - 10.5 mg/dL    Total Protein 8.9 (H) 6.0 - 8.5 g/dL    Albumin 4.2 3.5 - 5.2 g/dL    ALT (SGPT) 11 1 - 33 U/L    AST (SGOT) 42 (H) 1 - 32 U/L    Alkaline Phosphatase 57 39 - 117 U/L    Total Bilirubin 0.2 0.0 - 1.2 mg/dL    Globulin 4.7 gm/dL    A/G Ratio 0.9 g/dL     BUN/Creatinine Ratio 20.3 7.0 - 25.0    Anion Gap 10.6 5.0 - 15.0 mmol/L    eGFR 109.2 >60.0 mL/min/1.73   Protime-INR    Specimen: Blood   Result Value Ref Range    Protime 13.0 12.1 - 14.7 Seconds    INR 0.97 0.90 - 1.10   aPTT    Specimen: Blood   Result Value Ref Range    PTT 29.5 26.5 - 34.5 seconds   Lipase    Specimen: Blood   Result Value Ref Range    Lipase 19 13 - 60 U/L   Amylase    Specimen: Blood   Result Value Ref Range    Amylase 54 28 - 100 U/L   Urinalysis With Culture If Indicated - Urine, Clean Catch    Specimen: Urine, Clean Catch   Result Value Ref Range    Color, UA Yellow Yellow, Straw    Appearance, UA Clear Clear    pH, UA 6.5 5.0 - 8.0    Specific Gravity, UA >1.030 (H) 1.005 - 1.030    Glucose, UA Negative Negative    Ketones, UA Trace (A) Negative    Bilirubin, UA Negative Negative    Blood, UA Large (3+) (A) Negative    Protein, UA Negative Negative    Leuk Esterase, UA Negative Negative    Nitrite, UA Negative Negative    Urobilinogen, UA 0.2 E.U./dL 0.2 - 1.0 E.U./dL   Lactic Acid, Plasma    Specimen: Blood   Result Value Ref Range    Lactate 0.6 0.5 - 2.0 mmol/L   C-reactive Protein    Specimen: Blood   Result Value Ref Range    C-Reactive Protein 0.44 0.00 - 0.50 mg/dL   Magnesium    Specimen: Blood   Result Value Ref Range    Magnesium 1.7 1.6 - 2.6 mg/dL   D-dimer, Quantitative    Specimen: Blood   Result Value Ref Range    D-Dimer, Quantitative 0.61 (H) 0.00 - 0.50 MCGFEU/mL   CBC Auto Differential    Specimen: Blood   Result Value Ref Range    WBC 4.42 3.40 - 10.80 10*3/mm3    RBC 4.49 3.77 - 5.28 10*6/mm3    Hemoglobin 12.9 12.0 - 15.9 g/dL    Hematocrit 39.1 34.0 - 46.6 %    MCV 87.1 79.0 - 97.0 fL    MCH 28.7 26.6 - 33.0 pg    MCHC 33.0 31.5 - 35.7 g/dL    RDW 15.1 12.3 - 15.4 %    RDW-SD 48.2 37.0 - 54.0 fl    MPV 9.7 6.0 - 12.0 fL    Platelets 195 140 - 450 10*3/mm3    Neutrophil % 31.3 (L) 42.7 - 76.0 %    Lymphocyte % 48.4 (H) 19.6 - 45.3 %    Monocyte % 17.6 (H) 5.0 -  12.0 %    Eosinophil % 1.8 0.3 - 6.2 %    Basophil % 0.9 0.0 - 1.5 %    Immature Grans % 0.0 0.0 - 0.5 %    Neutrophils, Absolute 1.38 (L) 1.70 - 7.00 10*3/mm3    Lymphocytes, Absolute 2.14 0.70 - 3.10 10*3/mm3    Monocytes, Absolute 0.78 0.10 - 0.90 10*3/mm3    Eosinophils, Absolute 0.08 0.00 - 0.40 10*3/mm3    Basophils, Absolute 0.04 0.00 - 0.20 10*3/mm3    Immature Grans, Absolute 0.00 0.00 - 0.05 10*3/mm3    nRBC 0.0 0.0 - 0.2 /100 WBC   hCG, Serum, Qualitative    Specimen: Blood   Result Value Ref Range    HCG Qualitative Negative Negative   Urinalysis, Microscopic Only - Urine, Clean Catch    Specimen: Urine, Clean Catch   Result Value Ref Range    RBC, UA 6-10 (A) None Seen, 0-2 /HPF    WBC, UA 0-2 None Seen, 0-2 /HPF    Bacteria, UA None Seen None Seen /HPF    Squamous Epithelial Cells, UA 3-6 (A) None Seen, 0-2 /HPF    Hyaline Casts, UA None Seen None Seen /LPF    Methodology Automated Microscopy    Green Top (Gel)   Result Value Ref Range    Extra Tube Hold for add-ons.    Lavender Top   Result Value Ref Range    Extra Tube hold for add-on    Gold Top - SST   Result Value Ref Range    Extra Tube Hold for add-ons.    Light Blue Top   Result Value Ref Range    Extra Tube Hold for add-ons.           ED Course  ED Course as of 05/14/24 1227   Tue May 14, 2024   0900 XR Chest 1 View  FINDINGS:    Lungs and pleural spaces:  Unremarkable as visualized.  No  consolidation.  No pneumothorax.    Heart:  Unremarkable as visualized.  No cardiomegaly.    Mediastinum:  Unremarkable as visualized.  Normal mediastinal contour.    Bones/joints:  Unremarkable as visualized.  No acute fracture.     IMPRESSION:    Unremarkable exam. No acute cardiopulmonary findings identified.   [AH]   1021 US Non-ob Transvaginal  IMPRESSION:  1.  Thick walled complex cyst left ovary that is 1.7 cm with no luminal  Doppler flow noted and increased through transmission. May represent  resolving hemorrhagic cyst. Consider follow-up exam 6  weeks.  2.  Free fluid in the left adnexa and lower pelvis.  3.  Preserved Doppler flow noted to both ovaries. Otherwise unremarkable  exam.   [AH]   1132 CT Angiogram Chest Pulmonary Embolism  IMPRESSION:  1.  No PE.  2.  No acute thoracic findings.  3.  Thyroid goiter with substernal extension. Outpatient follow-up  ultrasound recommended.  4.  No effusion or pneumothorax.      []   1135 CT Abdomen Pelvis With & Without Contrast  IMPRESSION:  1.  No acute findings within the abdomen or pelvis.  2.  Mild stool retention throughout the colon which may indicate  underlying mild constipation. No bowel obstruction.  3.  Stable mild renal cortical scarring.  4.  Nonobstructing right kidney stone. No hydronephrosis.  5.  Prior appendectomy.  6.  Other nonacute/incidental findings above.   []      ED Course User Index  [] Nichole Ma PA                                             Medical Decision Making  48-year-old female who presents to the ED today for left flank pain.  CT of the chest, abdomen pelvis showed no acute abnormalities.  Specifically no PE or obstructive uropathy.  Ultrasound of the pelvis does show most likely a ruptured left ovarian cyst.  No evidence of UTI.  Lab work is unremarkable.  The patient was given IV Toradol and Compazine for her pain.  She does have a history of an allergy to IV contrast so she was premedicated prior to the CT scan and she tolerated that well.  She states she is already scheduled to see [facility] next week.  She was advised to keep this appointment and to return to the ED at any time if symptoms change or worsen.    Problems Addressed:  Ruptured ovarian cyst: complicated acute illness or injury    Amount and/or Complexity of Data Reviewed  Labs: ordered.  Radiology: ordered. Decision-making details documented in ED Course.    Risk  Prescription drug management.        Final diagnoses:   Ruptured ovarian cyst       ED Disposition  ED Disposition       ED  Disposition   Discharge    Condition   Stable    Comment   --               [facility]  [address]  [facility] [address] [phone]  Go in 1 week           Medication List        New Prescriptions      naproxen 500 MG tablet  Commonly known as: NAPROSYN  Take 1 tablet by mouth 2 (Two) Times a Day As Needed for Moderate Pain.               Where to Get Your Medications        These medications were sent to [facility] [address] [place] [address] [phone] [place] [id number] [address] [address]      Phone: [phone]   naproxen 500 MG tablet            Nihcole Ma PA  05/14/24 1220

## 2024-05-14 NOTE — DISCHARGE INSTRUCTIONS
Follow-up with [facility] as scheduled.  A prescription for naproxen was sent to your pharmacy.  Return to the ED at any time if symptoms change or worsen.

## 2024-05-14 NOTE — Clinical Note
[facility] EMERGENCY DEPARTMENT  [address] [facility] [address] [address]  Phone: [phone]    Meghan Rivas was seen and treated in our emergency department on 5/14/2024.  She may return to work on 05/15/2024.         Thank you for choosing [facility].    Nichole Ma, PA

## 2024-05-17 ENCOUNTER — APPOINTMENT (OUTPATIENT)
Dept: CT IMAGING | Facility: HOSPITAL | Age: 49
End: 2024-05-17
Payer: COMMERCIAL

## 2024-05-17 ENCOUNTER — HOSPITAL ENCOUNTER (EMERGENCY)
Facility: HOSPITAL | Age: 49
Discharge: LEFT BEFORE DISCHARGE | End: 2024-05-17
Attending: EMERGENCY MEDICINE
Payer: COMMERCIAL

## 2024-05-17 VITALS
OXYGEN SATURATION: 96 % | WEIGHT: 177.91 LBS | TEMPERATURE: 97.5 F | RESPIRATION RATE: 18 BRPM | SYSTOLIC BLOOD PRESSURE: 120 MMHG | DIASTOLIC BLOOD PRESSURE: 71 MMHG | HEART RATE: 61 BPM | HEIGHT: 67 IN | BODY MASS INDEX: 27.92 KG/M2

## 2024-05-17 DIAGNOSIS — R10.84 GENERALIZED ABDOMINAL PAIN: Primary | ICD-10-CM

## 2024-05-17 LAB
ALBUMIN SERPL-MCNC: 4.4 G/DL (ref 3.5–5.2)
ALBUMIN/GLOB SERPL: 1 G/DL
ALP SERPL-CCNC: 59 U/L (ref 39–117)
ALT SERPL W P-5'-P-CCNC: 11 U/L (ref 1–33)
AMPHET+METHAMPHET UR QL: POSITIVE
AMPHETAMINES UR QL: NEGATIVE
ANION GAP SERPL CALCULATED.3IONS-SCNC: 13.4 MMOL/L (ref 5–15)
AST SERPL-CCNC: 29 U/L (ref 1–32)
BACTERIA UR QL AUTO: ABNORMAL /HPF
BARBITURATES UR QL SCN: NEGATIVE
BASOPHILS # BLD AUTO: 0.02 10*3/MM3 (ref 0–0.2)
BASOPHILS NFR BLD AUTO: 0.4 % (ref 0–1.5)
BENZODIAZ UR QL SCN: NEGATIVE
BILIRUB SERPL-MCNC: 0.4 MG/DL (ref 0–1.2)
BILIRUB UR QL STRIP: ABNORMAL
BUN SERPL-MCNC: 13 MG/DL (ref 6–20)
BUN/CREAT SERPL: 20.3 (ref 7–25)
BUPRENORPHINE SERPL-MCNC: POSITIVE NG/ML
CALCIUM SPEC-SCNC: 9.4 MG/DL (ref 8.6–10.5)
CANNABINOIDS SERPL QL: POSITIVE
CHLORIDE SERPL-SCNC: 102 MMOL/L (ref 98–107)
CLARITY UR: ABNORMAL
CO2 SERPL-SCNC: 22.6 MMOL/L (ref 22–29)
COCAINE UR QL: NEGATIVE
COLOR UR: ABNORMAL
CREAT SERPL-MCNC: 0.64 MG/DL (ref 0.57–1)
CRP SERPL-MCNC: 0.88 MG/DL (ref 0–0.5)
DEPRECATED RDW RBC AUTO: 46.9 FL (ref 37–54)
EGFRCR SERPLBLD CKD-EPI 2021: 109.2 ML/MIN/1.73
EOSINOPHIL # BLD AUTO: 0.05 10*3/MM3 (ref 0–0.4)
EOSINOPHIL NFR BLD AUTO: 1 % (ref 0.3–6.2)
ERYTHROCYTE [DISTWIDTH] IN BLOOD BY AUTOMATED COUNT: 15 % (ref 12.3–15.4)
ETHANOL BLD-MCNC: <10 MG/DL (ref 0–10)
ETHANOL UR QL: <0.01 %
FENTANYL UR-MCNC: NEGATIVE NG/ML
FLUAV RNA RESP QL NAA+PROBE: NOT DETECTED
FLUBV RNA RESP QL NAA+PROBE: NOT DETECTED
GLOBULIN UR ELPH-MCNC: 4.2 GM/DL
GLUCOSE SERPL-MCNC: 81 MG/DL (ref 65–99)
GLUCOSE UR STRIP-MCNC: NEGATIVE MG/DL
HCG SERPL QL: NEGATIVE
HCT VFR BLD AUTO: 42.3 % (ref 34–46.6)
HGB BLD-MCNC: 13.8 G/DL (ref 12–15.9)
HGB UR QL STRIP.AUTO: ABNORMAL
HYALINE CASTS UR QL AUTO: ABNORMAL /LPF
IMM GRANULOCYTES # BLD AUTO: 0.01 10*3/MM3 (ref 0–0.05)
IMM GRANULOCYTES NFR BLD AUTO: 0.2 % (ref 0–0.5)
KETONES UR QL STRIP: ABNORMAL
LEUKOCYTE ESTERASE UR QL STRIP.AUTO: NEGATIVE
LIPASE SERPL-CCNC: 22 U/L (ref 13–60)
LYMPHOCYTES # BLD AUTO: 1.96 10*3/MM3 (ref 0.7–3.1)
LYMPHOCYTES NFR BLD AUTO: 40.8 % (ref 19.6–45.3)
MCH RBC QN AUTO: 27.9 PG (ref 26.6–33)
MCHC RBC AUTO-ENTMCNC: 32.6 G/DL (ref 31.5–35.7)
MCV RBC AUTO: 85.5 FL (ref 79–97)
METHADONE UR QL SCN: NEGATIVE
MONOCYTES # BLD AUTO: 0.51 10*3/MM3 (ref 0.1–0.9)
MONOCYTES NFR BLD AUTO: 10.6 % (ref 5–12)
NEUTROPHILS NFR BLD AUTO: 2.25 10*3/MM3 (ref 1.7–7)
NEUTROPHILS NFR BLD AUTO: 47 % (ref 42.7–76)
NITRITE UR QL STRIP: NEGATIVE
NRBC BLD AUTO-RTO: 0 /100 WBC (ref 0–0.2)
OPIATES UR QL: POSITIVE
OXYCODONE UR QL SCN: NEGATIVE
PCP UR QL SCN: NEGATIVE
PH UR STRIP.AUTO: 6 [PH] (ref 5–8)
PLATELET # BLD AUTO: 192 10*3/MM3 (ref 140–450)
PMV BLD AUTO: 9.6 FL (ref 6–12)
POTASSIUM SERPL-SCNC: 3.6 MMOL/L (ref 3.5–5.2)
PROT SERPL-MCNC: 8.6 G/DL (ref 6–8.5)
PROT UR QL STRIP: ABNORMAL
RBC # BLD AUTO: 4.95 10*6/MM3 (ref 3.77–5.28)
RBC # UR STRIP: ABNORMAL /HPF
REF LAB TEST METHOD: ABNORMAL
SARS-COV-2 RNA RESP QL NAA+PROBE: NOT DETECTED
SODIUM SERPL-SCNC: 138 MMOL/L (ref 136–145)
SP GR UR STRIP: >1.03 (ref 1–1.03)
SQUAMOUS #/AREA URNS HPF: ABNORMAL /HPF
TRICYCLICS UR QL SCN: NEGATIVE
UROBILINOGEN UR QL STRIP: ABNORMAL
WBC # UR STRIP: ABNORMAL /HPF
WBC NRBC COR # BLD AUTO: 4.8 10*3/MM3 (ref 3.4–10.8)

## 2024-05-17 PROCEDURE — 96374 THER/PROPH/DIAG INJ IV PUSH: CPT

## 2024-05-17 PROCEDURE — 82077 ASSAY SPEC XCP UR&BREATH IA: CPT | Performed by: PHYSICIAN ASSISTANT

## 2024-05-17 PROCEDURE — 81001 URINALYSIS AUTO W/SCOPE: CPT | Performed by: PHYSICIAN ASSISTANT

## 2024-05-17 PROCEDURE — 80307 DRUG TEST PRSMV CHEM ANLYZR: CPT | Performed by: PHYSICIAN ASSISTANT

## 2024-05-17 PROCEDURE — 25810000003 SODIUM CHLORIDE 0.9 % SOLUTION: Performed by: PHYSICIAN ASSISTANT

## 2024-05-17 PROCEDURE — 25010000002 KETOROLAC TROMETHAMINE PER 15 MG: Performed by: PHYSICIAN ASSISTANT

## 2024-05-17 PROCEDURE — 87636 SARSCOV2 & INF A&B AMP PRB: CPT | Performed by: PHYSICIAN ASSISTANT

## 2024-05-17 PROCEDURE — 74176 CT ABD & PELVIS W/O CONTRAST: CPT | Performed by: RADIOLOGY

## 2024-05-17 PROCEDURE — 80053 COMPREHEN METABOLIC PANEL: CPT | Performed by: PHYSICIAN ASSISTANT

## 2024-05-17 PROCEDURE — 83690 ASSAY OF LIPASE: CPT | Performed by: PHYSICIAN ASSISTANT

## 2024-05-17 PROCEDURE — 85025 COMPLETE CBC W/AUTO DIFF WBC: CPT | Performed by: PHYSICIAN ASSISTANT

## 2024-05-17 PROCEDURE — C1751 CATH, INF, PER/CENT/MIDLINE: HCPCS

## 2024-05-17 PROCEDURE — 74176 CT ABD & PELVIS W/O CONTRAST: CPT

## 2024-05-17 PROCEDURE — 25010000002 ONDANSETRON PER 1 MG: Performed by: PHYSICIAN ASSISTANT

## 2024-05-17 PROCEDURE — 84703 CHORIONIC GONADOTROPIN ASSAY: CPT | Performed by: PHYSICIAN ASSISTANT

## 2024-05-17 PROCEDURE — 36415 COLL VENOUS BLD VENIPUNCTURE: CPT

## 2024-05-17 PROCEDURE — 99284 EMERGENCY DEPT VISIT MOD MDM: CPT

## 2024-05-17 PROCEDURE — 96375 TX/PRO/DX INJ NEW DRUG ADDON: CPT

## 2024-05-17 PROCEDURE — 86140 C-REACTIVE PROTEIN: CPT | Performed by: PHYSICIAN ASSISTANT

## 2024-05-17 RX ORDER — KETOROLAC TROMETHAMINE 30 MG/ML
30 INJECTION, SOLUTION INTRAMUSCULAR; INTRAVENOUS ONCE
Status: COMPLETED | OUTPATIENT
Start: 2024-05-17 | End: 2024-05-17

## 2024-05-17 RX ORDER — ONDANSETRON 2 MG/ML
4 INJECTION INTRAMUSCULAR; INTRAVENOUS ONCE
Status: COMPLETED | OUTPATIENT
Start: 2024-05-17 | End: 2024-05-17

## 2024-05-17 RX ORDER — SODIUM CHLORIDE 0.9 % (FLUSH) 0.9 %
10 SYRINGE (ML) INJECTION AS NEEDED
Status: DISCONTINUED | OUTPATIENT
Start: 2024-05-17 | End: 2024-05-17 | Stop reason: HOSPADM

## 2024-05-17 RX ADMIN — ONDANSETRON 4 MG: 2 INJECTION INTRAMUSCULAR; INTRAVENOUS at 11:23

## 2024-05-17 RX ADMIN — SODIUM CHLORIDE 1000 ML: 9 INJECTION, SOLUTION INTRAVENOUS at 11:20

## 2024-05-17 RX ADMIN — KETOROLAC TROMETHAMINE 30 MG: 30 INJECTION, SOLUTION INTRAMUSCULAR; INTRAVENOUS at 11:24

## 2024-05-17 NOTE — ED NOTES
The patient had called out and asked another RN for pain medication. Provider asked the other RN to see if the patient was able to take narcotics with her suboxone. The patient became angry and her family came to the nurses station and began yelling at staff, Patient demanded IV be taken out she states that she came to the hospital for help, not to be accused of wanting pain medication. Patient was informed of the reason of questioning her medications and that some clinics have regulations about taking opioids with suboxone and we ask to make sure they do not lose their prescription, Patient refused to stay and left the ED AMA.

## 2024-05-17 NOTE — ED PROVIDER NOTES
Subjective   History of Present Illness  This is a 48 year old female patient who presents to the ER with chief complaint of abdominal pain. PMH significant for hypothyroidism, GERD, history of drug abuse, HTN. For 1 week, the patient has had constant abdominal pain. It starts in her left lower back and radiates into her left flank and the LLQ of her abdomen. She has associated nausea and vomiting. She denies urinary symptoms and fever. She denies diarrhea. She said she hasn't had a BM in 1 week.       Review of Systems   Constitutional: Negative.  Negative for fever.   HENT: Negative.     Respiratory: Negative.     Cardiovascular: Negative.  Negative for chest pain.   Gastrointestinal:  Positive for abdominal pain, nausea and vomiting. Negative for abdominal distention, anal bleeding, blood in stool, constipation, diarrhea and rectal pain.   Endocrine: Negative.    Genitourinary: Negative.  Negative for dysuria.   Skin: Negative.    Neurological: Negative.    Psychiatric/Behavioral: Negative.     All other systems reviewed and are negative.      Past Medical History:   Diagnosis Date    Anxiety     Depression     Disease of thyroid gland     GERD (gastroesophageal reflux disease)     Hepatitis C     History of drug abuse     Hypertension     Seizures     LAST SEIZURE 2019       Allergies   Allergen Reactions    Contrast Dye (Echo Or Unknown Ct/Mr) Anaphylaxis     Tolerated IV contrast CT 3/7/2022       Past Surgical History:   Procedure Laterality Date    APPENDECTOMY N/A 3/8/2022    Procedure: APPENDECTOMY LAPAROSCOPIC;  Surgeon: Babar Perkins MD;  Location: [facility] OR;  Service: General;  Laterality: N/A;    COLONOSCOPY N/A 10/5/2022    Procedure: COLONOSCOPY;  Surgeon: Neris Bear MD;  Location: [facility] OR;  Service: Gastroenterology;  Laterality: N/A;    OVARIAN CYST SURGERY      x7 surgeries       Family History   Adopted: Yes   Problem Relation Age of Onset    No Known Problems Adoptive  Father     Behavior problems Adoptive Mother        Social History     Socioeconomic History    Marital status:    Tobacco Use    Smoking status: Every Day     Current packs/day: 0.25     Average packs/day: 0.3 packs/day for 3.5 years (0.9 ttl pk-yrs)     Types: Cigarettes    Smokeless tobacco: Never   Vaping Use    Vaping status: Former   Substance and Sexual Activity    Alcohol use: Not Currently     Comment: sober 4 years    Drug use: Not Currently     Types: IV, Heroin, Methamphetamines     Comment: clean 5 years    Sexual activity: Yes     Partners: Male           Objective   Physical Exam  Vitals and nursing note reviewed.   Constitutional:       General: She is not in acute distress.     Appearance: She is well-developed. She is not diaphoretic.   HENT:      Head: Normocephalic and atraumatic.      Right Ear: External ear normal.      Left Ear: External ear normal.      Nose: Nose normal.   Eyes:      Conjunctiva/sclera: Conjunctivae normal.      Pupils: Pupils are equal, round, and reactive to light.   Neck:      Vascular: No JVD.      Trachea: No tracheal deviation.   Cardiovascular:      Rate and Rhythm: Normal rate and regular rhythm.      Heart sounds: Normal heart sounds. No murmur heard.  Pulmonary:      Effort: Pulmonary effort is normal. No respiratory distress.      Breath sounds: Normal breath sounds. No wheezing.   Abdominal:      General: Bowel sounds are normal.      Palpations: Abdomen is soft.      Tenderness: There is no abdominal tenderness. There is no right CVA tenderness, left CVA tenderness, guarding or rebound.   Musculoskeletal:         General: No deformity. Normal range of motion.      Cervical back: Normal range of motion and neck supple.   Skin:     General: Skin is warm and dry.      Coloration: Skin is not pale.      Findings: No erythema or rash.   Neurological:      Mental Status: She is alert and oriented to person, place, and time.      Cranial Nerves: No cranial nerve  deficit.   Psychiatric:         Behavior: Behavior normal.         Thought Content: Thought content normal.         Procedures       Results for orders placed or performed during the hospital encounter of 05/17/24   COVID-19 and FLU A/B PCR, 1 HR TAT - Swab, Nasopharynx    Specimen: Nasopharynx; Swab   Result Value Ref Range    COVID19 Not Detected Not Detected - Ref. Range    Influenza A PCR Not Detected Not Detected    Influenza B PCR Not Detected Not Detected   Comprehensive Metabolic Panel    Specimen: Arm, Right; Blood   Result Value Ref Range    Glucose 81 65 - 99 mg/dL    BUN 13 6 - 20 mg/dL    Creatinine 0.64 0.57 - 1.00 mg/dL    Sodium 138 136 - 145 mmol/L    Potassium 3.6 3.5 - 5.2 mmol/L    Chloride 102 98 - 107 mmol/L    CO2 22.6 22.0 - 29.0 mmol/L    Calcium 9.4 8.6 - 10.5 mg/dL    Total Protein 8.6 (H) 6.0 - 8.5 g/dL    Albumin 4.4 3.5 - 5.2 g/dL    ALT (SGPT) 11 1 - 33 U/L    AST (SGOT) 29 1 - 32 U/L    Alkaline Phosphatase 59 39 - 117 U/L    Total Bilirubin 0.4 0.0 - 1.2 mg/dL    Globulin 4.2 gm/dL    A/G Ratio 1.0 g/dL    BUN/Creatinine Ratio 20.3 7.0 - 25.0    Anion Gap 13.4 5.0 - 15.0 mmol/L    eGFR 109.2 >60.0 mL/min/1.73   Lipase    Specimen: Arm, Right; Blood   Result Value Ref Range    Lipase 22 13 - 60 U/L   hCG, Serum, Qualitative    Specimen: Arm, Right; Blood   Result Value Ref Range    HCG Qualitative Negative Negative   Urinalysis With Culture If Indicated - Urine, Clean Catch    Specimen: Urine, Clean Catch   Result Value Ref Range    Color, UA Dark Yellow (A) Yellow, Straw    Appearance, UA Cloudy (A) Clear    pH, UA 6.0 5.0 - 8.0    Specific Gravity, UA >1.030 (H) 1.005 - 1.030    Glucose, UA Negative Negative    Ketones, UA 80 mg/dL (3+) (A) Negative    Bilirubin, UA Small (1+) (A) Negative    Blood, UA Large (3+) (A) Negative    Protein, UA 30 mg/dL (1+) (A) Negative    Leuk Esterase, UA Negative Negative    Nitrite, UA Negative Negative    Urobilinogen, UA 1.0 E.U./dL 0.2 - 1.0  E.U./dL   C-reactive Protein    Specimen: Arm, Right; Blood   Result Value Ref Range    C-Reactive Protein 0.88 (H) 0.00 - 0.50 mg/dL   Urine Drug Screen - Urine, Clean Catch    Specimen: Urine, Clean Catch   Result Value Ref Range    THC, Screen, Urine Positive (A) Negative    Phencyclidine (PCP), Urine Negative Negative    Cocaine Screen, Urine Negative Negative    Methamphetamine, Ur Negative Negative    Opiate Screen Positive (A) Negative    Amphetamine Screen, Urine Positive (A) Negative    Benzodiazepine Screen, Urine Negative Negative    Tricyclic Antidepressants Screen Negative Negative    Methadone Screen, Urine Negative Negative    Barbiturates Screen, Urine Negative Negative    Oxycodone Screen, Urine Negative Negative    Buprenorphine, Screen, Urine Positive (A) Negative   Ethanol    Specimen: Arm, Right; Blood   Result Value Ref Range    Ethanol <10 0 - 10 mg/dL    Ethanol % <0.010 %   CBC Auto Differential    Specimen: Arm, Right; Blood   Result Value Ref Range    WBC 4.80 3.40 - 10.80 10*3/mm3    RBC 4.95 3.77 - 5.28 10*6/mm3    Hemoglobin 13.8 12.0 - 15.9 g/dL    Hematocrit 42.3 34.0 - 46.6 %    MCV 85.5 79.0 - 97.0 fL    MCH 27.9 26.6 - 33.0 pg    MCHC 32.6 31.5 - 35.7 g/dL    RDW 15.0 12.3 - 15.4 %    RDW-SD 46.9 37.0 - 54.0 fl    MPV 9.6 6.0 - 12.0 fL    Platelets 192 140 - 450 10*3/mm3    Neutrophil % 47.0 42.7 - 76.0 %    Lymphocyte % 40.8 19.6 - 45.3 %    Monocyte % 10.6 5.0 - 12.0 %    Eosinophil % 1.0 0.3 - 6.2 %    Basophil % 0.4 0.0 - 1.5 %    Immature Grans % 0.2 0.0 - 0.5 %    Neutrophils, Absolute 2.25 1.70 - 7.00 10*3/mm3    Lymphocytes, Absolute 1.96 0.70 - 3.10 10*3/mm3    Monocytes, Absolute 0.51 0.10 - 0.90 10*3/mm3    Eosinophils, Absolute 0.05 0.00 - 0.40 10*3/mm3    Basophils, Absolute 0.02 0.00 - 0.20 10*3/mm3    Immature Grans, Absolute 0.01 0.00 - 0.05 10*3/mm3    nRBC 0.0 0.0 - 0.2 /100 WBC   Fentanyl, Urine - Urine, Clean Catch    Specimen: Urine, Clean Catch   Result Value  Ref Range    Fentanyl, Urine Negative Negative   Urinalysis, Microscopic Only - Urine, Clean Catch    Specimen: Urine, Clean Catch   Result Value Ref Range    RBC, UA Too Numerous to Count (A) None Seen, 0-2 /HPF    WBC, UA 3-5 (A) None Seen, 0-2 /HPF    Bacteria, UA 1+ (A) None Seen /HPF    Squamous Epithelial Cells, UA 3-6 (A) None Seen, 0-2 /HPF    Hyaline Casts, UA 0-2 None Seen /LPF    Methodology Automated Microscopy           ED Course  ED Course as of 05/17/24 1248   Fri May 17, 2024   1246 CT Abdomen Pelvis Without Contrast  IMPRESSION:  1.  There remains no evidence of acute findings within abdomen or  pelvis.  2.  Minimal decrease in the degree of stool retention with persistent  changes to suggest constipation.  3.  No bowel obstruction.  4.  Punctate nonobstructing right kidney stone without evidence of  hydronephrosis. Mild right renal cortical scarring noted.  5.  Appendectomy.  6.  Other incidental/nonacute findings above stable from previous.        This report was finalized on 5/17/2024 12:42 PM by Dr. Narciso Pineda MD   [MM]   1246 Patient became angry when questioned about whether or not she as able to take narcotic pain medication with her suboxone. We explained that we only ask this question because we don't want to put her at risk of losing her suboxone prescription if she fails a drug test. She still was angry and decided to sign out AMA and understands that risks including permanent disability and death.  [MM]      ED Course User Index  [MM] Diamond Daley PA                                             Medical Decision Making    This is a 48 year old female patient who presents to the ER with chief complaint of abdominal pain. PMH significant for hypothyroidism, GERD, history of drug abuse, HTN. For 1 week, the patient has had constant abdominal pain. It starts in her left lower back and radiates into her left flank and the LLQ of her abdomen. She has associated nausea and vomiting.  She denies urinary symptoms and fever. She denies diarrhea. She said she hasn't had a BM in 1 week.       Amount and/or Complexity of Data Reviewed  Labs: ordered. Decision-making details documented in ED Course.  Radiology: ordered. Decision-making details documented in ED Course.    Risk  Prescription drug management.        Final diagnoses:   Generalized abdominal pain       ED Disposition  ED Disposition       ED Disposition   AMA    Condition   --    Comment   --               No follow-up provider specified.       Medication List      No changes were made to your prescriptions during this visit.            Diamond Dlaey PA  05/17/24 1247

## 2024-05-21 DIAGNOSIS — F41.1 GAD (GENERALIZED ANXIETY DISORDER): ICD-10-CM

## 2024-05-21 DIAGNOSIS — F40.10 SOCIAL ANXIETY DISORDER: ICD-10-CM

## 2024-05-21 DIAGNOSIS — F43.12 CHRONIC POST-TRAUMATIC STRESS DISORDER (PTSD): ICD-10-CM

## 2024-05-21 RX ORDER — VENLAFAXINE HYDROCHLORIDE 150 MG/1
150 CAPSULE, EXTENDED RELEASE ORAL DAILY
Qty: 30 CAPSULE | Refills: 0 | Status: SHIPPED | OUTPATIENT
Start: 2024-05-21

## 2024-05-21 RX ORDER — PROPRANOLOL HYDROCHLORIDE 20 MG/1
TABLET ORAL
Qty: 60 TABLET | Refills: 0 | Status: SHIPPED | OUTPATIENT
Start: 2024-05-21

## 2024-05-28 ENCOUNTER — LAB (OUTPATIENT)
Dept: FAMILY MEDICINE CLINIC | Facility: CLINIC | Age: 49
End: 2024-05-28
Payer: COMMERCIAL

## 2024-05-28 ENCOUNTER — OFFICE VISIT (OUTPATIENT)
Dept: FAMILY MEDICINE CLINIC | Facility: CLINIC | Age: 49
End: 2024-05-28
Payer: COMMERCIAL

## 2024-05-28 VITALS
BODY MASS INDEX: 29.07 KG/M2 | OXYGEN SATURATION: 96 % | HEART RATE: 84 BPM | WEIGHT: 185.2 LBS | DIASTOLIC BLOOD PRESSURE: 76 MMHG | HEIGHT: 67 IN | TEMPERATURE: 97.5 F | SYSTOLIC BLOOD PRESSURE: 130 MMHG

## 2024-05-28 DIAGNOSIS — K59.04 CHRONIC IDIOPATHIC CONSTIPATION: ICD-10-CM

## 2024-05-28 DIAGNOSIS — E06.9 THYROIDITIS: ICD-10-CM

## 2024-05-28 DIAGNOSIS — M79.10 MUSCLE PAIN: Primary | ICD-10-CM

## 2024-05-28 DIAGNOSIS — M79.10 MUSCLE PAIN: ICD-10-CM

## 2024-05-28 DIAGNOSIS — N83.299 COMPLEX OVARIAN CYST: ICD-10-CM

## 2024-05-28 DIAGNOSIS — E87.6 HYPOKALEMIA: ICD-10-CM

## 2024-05-28 PROCEDURE — 86235 NUCLEAR ANTIGEN ANTIBODY: CPT | Performed by: STUDENT IN AN ORGANIZED HEALTH CARE EDUCATION/TRAINING PROGRAM

## 2024-05-28 PROCEDURE — 36415 COLL VENOUS BLD VENIPUNCTURE: CPT

## 2024-05-28 PROCEDURE — 83516 IMMUNOASSAY NONANTIBODY: CPT | Performed by: STUDENT IN AN ORGANIZED HEALTH CARE EDUCATION/TRAINING PROGRAM

## 2024-05-28 PROCEDURE — 80048 BASIC METABOLIC PNL TOTAL CA: CPT | Performed by: STUDENT IN AN ORGANIZED HEALTH CARE EDUCATION/TRAINING PROGRAM

## 2024-05-28 PROCEDURE — 82550 ASSAY OF CK (CPK): CPT | Performed by: STUDENT IN AN ORGANIZED HEALTH CARE EDUCATION/TRAINING PROGRAM

## 2024-05-28 PROCEDURE — 84443 ASSAY THYROID STIM HORMONE: CPT | Performed by: STUDENT IN AN ORGANIZED HEALTH CARE EDUCATION/TRAINING PROGRAM

## 2024-05-28 PROCEDURE — 1125F AMNT PAIN NOTED PAIN PRSNT: CPT | Performed by: STUDENT IN AN ORGANIZED HEALTH CARE EDUCATION/TRAINING PROGRAM

## 2024-05-28 PROCEDURE — 86140 C-REACTIVE PROTEIN: CPT | Performed by: STUDENT IN AN ORGANIZED HEALTH CARE EDUCATION/TRAINING PROGRAM

## 2024-05-28 PROCEDURE — 99214 OFFICE O/P EST MOD 30 MIN: CPT | Performed by: STUDENT IN AN ORGANIZED HEALTH CARE EDUCATION/TRAINING PROGRAM

## 2024-05-28 PROCEDURE — 84439 ASSAY OF FREE THYROXINE: CPT | Performed by: STUDENT IN AN ORGANIZED HEALTH CARE EDUCATION/TRAINING PROGRAM

## 2024-05-28 RX ORDER — POLYETHYLENE GLYCOL 3350 17 G/17G
17 POWDER, FOR SOLUTION ORAL DAILY
COMMUNITY
Start: 2024-04-30 | End: 2024-05-30 | Stop reason: SDDI

## 2024-05-28 RX ORDER — LACTULOSE 10 G/15ML
20 SOLUTION ORAL 2 TIMES DAILY
Qty: 473 ML | Refills: 0 | Status: SHIPPED | OUTPATIENT
Start: 2024-05-28 | End: 2024-05-30 | Stop reason: SDDI

## 2024-05-28 RX ORDER — PREDNISONE 20 MG/1
40 TABLET ORAL DAILY
Qty: 10 TABLET | Refills: 0 | Status: SHIPPED | OUTPATIENT
Start: 2024-05-28

## 2024-05-28 RX ORDER — ONDANSETRON 4 MG/1
4 TABLET, ORALLY DISINTEGRATING ORAL EVERY 8 HOURS PRN
COMMUNITY

## 2024-05-28 RX ORDER — LEVOTHYROXINE SODIUM 0.07 MG/1
75 TABLET ORAL DAILY
Qty: 90 TABLET | Refills: 0 | Status: SHIPPED | OUTPATIENT
Start: 2024-05-28

## 2024-05-29 ENCOUNTER — TELEPHONE (OUTPATIENT)
Dept: FAMILY MEDICINE CLINIC | Facility: CLINIC | Age: 49
End: 2024-05-29
Payer: COMMERCIAL

## 2024-05-29 LAB
ANION GAP SERPL CALCULATED.3IONS-SCNC: 11.9 MMOL/L (ref 5–15)
BUN SERPL-MCNC: 17 MG/DL (ref 6–20)
BUN/CREAT SERPL: 23.3 (ref 7–25)
CALCIUM SPEC-SCNC: 10.6 MG/DL (ref 8.6–10.5)
CHLORIDE SERPL-SCNC: 105 MMOL/L (ref 98–107)
CK SERPL-CCNC: 54 U/L (ref 20–180)
CO2 SERPL-SCNC: 26.1 MMOL/L (ref 22–29)
CREAT SERPL-MCNC: 0.73 MG/DL (ref 0.57–1)
CRP SERPL-MCNC: 0.3 MG/DL (ref 0–0.5)
EGFRCR SERPLBLD CKD-EPI 2021: 101.6 ML/MIN/1.73
GLUCOSE SERPL-MCNC: 88 MG/DL (ref 65–99)
POTASSIUM SERPL-SCNC: 4.4 MMOL/L (ref 3.5–5.2)
SODIUM SERPL-SCNC: 143 MMOL/L (ref 136–145)
T4 FREE SERPL-MCNC: 0.86 NG/DL (ref 0.93–1.7)
TSH SERPL DL<=0.05 MIU/L-ACNC: 5.49 UIU/ML (ref 0.27–4.2)

## 2024-05-29 NOTE — TELEPHONE ENCOUNTER
Javad Navarrete DO [facility]  Please let the patient know that her labs show no concerns, we will discuss further at her next visit. Ask her if the steroids have improved her symptoms?

## 2024-05-29 NOTE — TELEPHONE ENCOUNTER
Spoke with patient & she verbalized understanding,she reports the steroids did not help her symptoms at all.

## 2024-05-29 NOTE — PROGRESS NOTES
Chief Complaint  Abdominal Pain, Back Pain, and Ovarian Cyst    HPI:   Abdominal Pain    Back Pain  Associated symptoms include abdominal pain.   Ovarian Cyst  Associated symptoms include abdominal pain.      Meghan Rivas is a 48 y.o. female who presents today to [facility] for Abdominal Pain, Back Pain, and Ovarian Cyst. Patient reports severe 10/10 pain in her bilateral hips radiating to her mid abdomen for the past 2 weeks. She has had 3 ER visits and 3 CT scans of her abdomen over the past 2 weeks that have been unremarkable. She reports significant weakness in her hip flexors as well. Difficulty standing from a seated position and fatigue. She reports constipation and miralax has been ineffective.     Previous History:   Past Medical History:   Diagnosis Date    Anxiety     Depression     Disease of thyroid gland     GERD (gastroesophageal reflux disease)     Hepatitis C     History of drug abuse     Hypertension     Seizures     LAST SEIZURE 2019      Past Surgical History:   Procedure Laterality Date    APPENDECTOMY N/A 3/8/2022    Procedure: APPENDECTOMY LAPAROSCOPIC;  Surgeon: Babar Perkins MD;  Location: [facility] OR;  Service: General;  Laterality: N/A;    COLONOSCOPY N/A 10/5/2022    Procedure: COLONOSCOPY;  Surgeon: Neris Bear MD;  Location: [facility];  Service: Gastroenterology;  Laterality: N/A;    OVARIAN CYST SURGERY      x7 surgeries      Social History     Socioeconomic History    Marital status:    Tobacco Use    Smoking status: Every Day     Current packs/day: 0.25     Average packs/day: 0.3 packs/day for 3.5 years (0.9 ttl pk-yrs)     Types: Cigarettes    Smokeless tobacco: Never   Vaping Use    Vaping status: Former   Substance and Sexual Activity    Alcohol use: Not Currently     Comment: sober 4 years    Drug use: Not Currently     Types: IV, Heroin, Methamphetamines     Comment: clean 5 years    Sexual activity: Yes     Partners:  Male      Health Maintenance Due   Topic Date Due    MAMMOGRAM  Never done    Pneumococcal Vaccine 0-64 (1 of 2 - PCV) Never done    Hepatitis B (1 of 3 - 19+ 3-dose series) Never done    TDAP/TD VACCINES (1 - Tdap) Never done    ANNUAL PHYSICAL  Never done    PAP SMEAR  Never done    COVID-19 Vaccine (3 - 2023-24 season) 09/01/2023        Current Medications:  Current Outpatient Medications   Medication Sig Dispense Refill    albuterol sulfate  (90 Base) MCG/ACT inhaler Inhale 2 puffs Every 6 (Six) Hours As Needed for Wheezing. 6.7 g 2    ARIPiprazole (ABILIFY) 10 MG tablet Take 1 tablet by mouth Every Night. 30 tablet 2    clonazePAM (KlonoPIN) 0.5 MG tablet Take 1 tablet by mouth Daily As Needed for Anxiety. 30 tablet 2    levothyroxine (SYNTHROID, LEVOTHROID) 75 MCG tablet Take 1 tablet by mouth Daily. 90 tablet 0    naloxone (NARCAN) 4 MG/0.1ML nasal spray Call 911. Don't prime. [place] in 1 nostril for overdose. Repeat in 2-3 minutes in other nostril if no or minimal breathing/responsiveness. 2 each 0    naproxen (NAPROSYN) 500 MG tablet Take 1 tablet by mouth 2 (Two) Times a Day As Needed for Moderate Pain. 20 tablet 0    omeprazole (PrilOSEC) 40 MG capsule Take 1 capsule by mouth Daily. 30 capsule 2    ondansetron (ZOFRAN) 4 MG tablet Take 2 tablets by mouth Every 12 (Twelve) Hours.      ondansetron ODT (ZOFRAN-ODT) 4 MG disintegrating tablet Take 1 tablet by mouth Every 8 (Eight) Hours As Needed for Nausea or Vomiting.      polyethylene glycol (MIRALAX) 17 GM/SCOOP powder Take 17 g by mouth Daily.      potassium citrate (UROCIT-K) 5 MEQ (540 MG) CR tablet Take 20 mEq by mouth 2 (Two) Times a Day With Meals.      propranolol (INDERAL) 20 MG tablet TAKE 1/2 TO 1 (ONE-HALF TO ONE) TABLET BY MOUTH TWICE DAILY AS NEEDED FOR ANXIETY 60 tablet 0    Semaglutide-Weight Management 0.25 MG/0.5ML solution auto-injector Inject 0.5 mL under the skin into the appropriate area as directed 1 (One) Time Per Week.       "venlafaxine XR (EFFEXOR-XR) 150 MG 24 hr capsule Take 1 capsule by mouth once daily 30 capsule 0    chlorhexidine (HIBICLENS) 4 % external liquid Apply  topically to the appropriate area as directed Daily As Needed for Wound Care. (Patient not taking: Reported on 5/28/2024) 500 mL 1    clindamycin (CLEOCIN T) 1 % external solution Apply  topically to the appropriate area as directed 2 (Two) Times a Day. (Patient not taking: Reported on 5/28/2024) 60 mL 1    doxycycline (VIBRAMYCIN) 100 MG capsule Take 1 capsule by mouth 2 (Two) Times a Day. (Patient not taking: Reported on 5/28/2024) 14 capsule 0    lactulose (CHRONULAC) 10 GM/15ML solution Take 30 mL by mouth 2 (Two) Times a Day. 473 mL 0    predniSONE (DELTASONE) 20 MG tablet Take 2 tablets by mouth Daily. 10 tablet 0     No current facility-administered medications for this visit.       Allergies:   Allergies   Allergen Reactions    Contrast Dye (Echo Or Unknown Ct/Mr) Anaphylaxis     Tolerated IV contrast CT 3/7/2022       Vitals:   /76 (BP Location: Right arm, Patient Position: Sitting, Cuff Size: Adult)   Pulse 84   Temp 97.5 °F (36.4 °C) (Temporal)   Ht 170.2 cm (67.01\")   Wt 84 kg (185 lb 3.2 oz)   LMP 05/14/2024   SpO2 96%   BMI 29.00 kg/m²     Estimated body mass index is 29 kg/m² as calculated from the following:    Height as of this encounter: 170.2 cm (67.01\").    Weight as of this encounter: 84 kg (185 lb 3.2 oz).    Meghan Rivas  reports that she has been smoking cigarettes. She has a 0.9 pack-year smoking history. She has never used smokeless tobacco.            Physical Exam:   Physical Exam  Constitutional:       Appearance: Normal appearance.   HENT:      Mouth/Throat:      Mouth: Mucous membranes are moist.   Cardiovascular:      Rate and Rhythm: Normal rate and regular rhythm.   Pulmonary:      Effort: Pulmonary effort is normal.      Breath sounds: Normal breath sounds. No wheezing or rhonchi.   Abdominal:      General: Abdomen is " flat. There is no distension.      Palpations: Abdomen is soft. There is no mass.      Tenderness: There is no abdominal tenderness. There is no right CVA tenderness or left CVA tenderness.   Musculoskeletal:         General: Normal range of motion.   Skin:     General: Skin is warm and dry.   Neurological:      Mental Status: She is alert.      Comments: Slowed ability to stand up, has to use arms in order to stand from a seated position.    Psychiatric:         Mood and Affect: Mood normal.          Lab Results:   Lab on 05/28/2024   Component Date Value Ref Range Status    Creatine Kinase 05/28/2024 54  20 - 180 U/L Final    TSH 05/28/2024 5.490 (H)  0.270 - 4.200 uIU/mL Final    C-Reactive Protein 05/28/2024 0.30  0.00 - 0.50 mg/dL Final    Glucose 05/28/2024 88  65 - 99 mg/dL Final    BUN 05/28/2024 17  6 - 20 mg/dL Final    Creatinine 05/28/2024 0.73  0.57 - 1.00 mg/dL Final    Sodium 05/28/2024 143  136 - 145 mmol/L Final    Potassium 05/28/2024 4.4  3.5 - 5.2 mmol/L Final    Chloride 05/28/2024 105  98 - 107 mmol/L Final    CO2 05/28/2024 26.1  22.0 - 29.0 mmol/L Final    Calcium 05/28/2024 10.6 (H)  8.6 - 10.5 mg/dL Final    BUN/Creatinine Ratio 05/28/2024 23.3  7.0 - 25.0 Final    Anion Gap 05/28/2024 11.9  5.0 - 15.0 mmol/L Final    eGFR 05/28/2024 101.6  >60.0 mL/min/1.73 Final    Free T4 05/28/2024 0.86 (L)  0.93 - 1.70 ng/dL Final   Admission on 05/17/2024, Discharged on 05/17/2024   Component Date Value Ref Range Status    Glucose 05/17/2024 81  65 - 99 mg/dL Final    BUN 05/17/2024 13  6 - 20 mg/dL Final    Creatinine 05/17/2024 0.64  0.57 - 1.00 mg/dL Final    Sodium 05/17/2024 138  136 - 145 mmol/L Final    Potassium 05/17/2024 3.6  3.5 - 5.2 mmol/L Final    Chloride 05/17/2024 102  98 - 107 mmol/L Final    CO2 05/17/2024 22.6  22.0 - 29.0 mmol/L Final    Calcium 05/17/2024 9.4  8.6 - 10.5 mg/dL Final    Total Protein 05/17/2024 8.6 (H)  6.0 - 8.5 g/dL Final    Albumin 05/17/2024 4.4  3.5 - 5.2  g/dL Final    ALT (SGPT) 05/17/2024 11  1 - 33 U/L Final    AST (SGOT) 05/17/2024 29  1 - 32 U/L Final    Alkaline Phosphatase 05/17/2024 59  39 - 117 U/L Final    Total Bilirubin 05/17/2024 0.4  0.0 - 1.2 mg/dL Final    Globulin 05/17/2024 4.2  gm/dL Final    A/G Ratio 05/17/2024 1.0  g/dL Final    BUN/Creatinine Ratio 05/17/2024 20.3  7.0 - 25.0 Final    Anion Gap 05/17/2024 13.4  5.0 - 15.0 mmol/L Final    eGFR 05/17/2024 109.2  >60.0 mL/min/1.73 Final    COVID19 05/17/2024 Not Detected  Not Detected - Ref. Range Final    Influenza A PCR 05/17/2024 Not Detected  Not Detected Final    Influenza B PCR 05/17/2024 Not Detected  Not Detected Final    Lipase 05/17/2024 22  13 - 60 U/L Final    HCG Qualitative 05/17/2024 Negative  Negative Final    Color, UA 05/17/2024 Dark Yellow (A)  Yellow, Straw Final    Appearance, UA 05/17/2024 Cloudy (A)  Clear Final    pH, UA 05/17/2024 6.0  5.0 - 8.0 Final    Specific Gravity, UA 05/17/2024 >1.030 (H)  1.005 - 1.030 Final    Glucose, UA 05/17/2024 Negative  Negative Final    Ketones, UA 05/17/2024 80 mg/dL (3+) (A)  Negative Final    Bilirubin, UA 05/17/2024 Small (1+) (A)  Negative Final    Blood, UA 05/17/2024 Large (3+) (A)  Negative Final    Protein, UA 05/17/2024 30 mg/dL (1+) (A)  Negative Final    Leuk Esterase, UA 05/17/2024 Negative  Negative Final    Nitrite, UA 05/17/2024 Negative  Negative Final    Urobilinogen, UA 05/17/2024 1.0 E.U./dL  0.2 - 1.0 E.U./dL Final    C-Reactive Protein 05/17/2024 0.88 (H)  0.00 - 0.50 mg/dL Final    THC, Screen, Urine 05/17/2024 Positive (A)  Negative Final    Phencyclidine (PCP), Urine 05/17/2024 Negative  Negative Final    Cocaine Screen, Urine 05/17/2024 Negative  Negative Final    Methamphetamine, Ur 05/17/2024 Negative  Negative Final    Opiate Screen 05/17/2024 Positive (A)  Negative Final    Amphetamine Screen, Urine 05/17/2024 Positive (A)  Negative Final    Benzodiazepine Screen, Urine 05/17/2024 Negative  Negative Final     Tricyclic Antidepressants Screen 05/17/2024 Negative  Negative Final    Methadone Screen, Urine 05/17/2024 Negative  Negative Final    Barbiturates Screen, Urine 05/17/2024 Negative  Negative Final    Oxycodone Screen, Urine 05/17/2024 Negative  Negative Final    Buprenorphine, Screen, Urine 05/17/2024 Positive (A)  Negative Final    Ethanol 05/17/2024 <10  0 - 10 mg/dL Final    Ethanol % 05/17/2024 <0.010  % Final    WBC 05/17/2024 4.80  3.40 - 10.80 10*3/mm3 Final    RBC 05/17/2024 4.95  3.77 - 5.28 10*6/mm3 Final    Hemoglobin 05/17/2024 13.8  12.0 - 15.9 g/dL Final    Hematocrit 05/17/2024 42.3  34.0 - 46.6 % Final    MCV 05/17/2024 85.5  79.0 - 97.0 fL Final    MCH 05/17/2024 27.9  26.6 - 33.0 pg Final    MCHC 05/17/2024 32.6  31.5 - 35.7 g/dL Final    RDW 05/17/2024 15.0  12.3 - 15.4 % Final    RDW-SD 05/17/2024 46.9  37.0 - 54.0 fl Final    MPV 05/17/2024 9.6  6.0 - 12.0 fL Final    Platelets 05/17/2024 192  140 - 450 10*3/mm3 Final    Neutrophil % 05/17/2024 47.0  42.7 - 76.0 % Final    Lymphocyte % 05/17/2024 40.8  19.6 - 45.3 % Final    Monocyte % 05/17/2024 10.6  5.0 - 12.0 % Final    Eosinophil % 05/17/2024 1.0  0.3 - 6.2 % Final    Basophil % 05/17/2024 0.4  0.0 - 1.5 % Final    Immature Grans % 05/17/2024 0.2  0.0 - 0.5 % Final    Neutrophils, Absolute 05/17/2024 2.25  1.70 - 7.00 10*3/mm3 Final    Lymphocytes, Absolute 05/17/2024 1.96  0.70 - 3.10 10*3/mm3 Final    Monocytes, Absolute 05/17/2024 0.51  0.10 - 0.90 10*3/mm3 Final    Eosinophils, Absolute 05/17/2024 0.05  0.00 - 0.40 10*3/mm3 Final    Basophils, Absolute 05/17/2024 0.02  0.00 - 0.20 10*3/mm3 Final    Immature Grans, Absolute 05/17/2024 0.01  0.00 - 0.05 10*3/mm3 Final    nRBC 05/17/2024 0.0  0.0 - 0.2 /100 WBC Final    Fentanyl, Urine 05/17/2024 Negative  Negative Final    RBC, UA 05/17/2024 Too Numerous to Count (A)  None Seen, 0-2 /HPF Final    WBC, UA 05/17/2024 3-5 (A)  None Seen, 0-2 /HPF Final    Urine culture not indicated.     Bacteria, UA 05/17/2024 1+ (A)  None Seen /HPF Final    Squamous Epithelial Cells, UA 05/17/2024 3-6 (A)  None Seen, 0-2 /HPF Final    Hyaline Casts, UA 05/17/2024 0-2  None Seen /LPF Final    Methodology 05/17/2024 Automated Microscopy   Final   Admission on 05/14/2024, Discharged on 05/14/2024   Component Date Value Ref Range Status    Glucose 05/14/2024 95  65 - 99 mg/dL Final    BUN 05/14/2024 13  6 - 20 mg/dL Final    Creatinine 05/14/2024 0.64  0.57 - 1.00 mg/dL Final    Sodium 05/14/2024 138  136 - 145 mmol/L Final    Potassium 05/14/2024 4.1  3.5 - 5.2 mmol/L Final    Specimen hemolyzed.  Result may be falsely elevated.    Chloride 05/14/2024 104  98 - 107 mmol/L Final    CO2 05/14/2024 23.4  22.0 - 29.0 mmol/L Final    Calcium 05/14/2024 9.4  8.6 - 10.5 mg/dL Final    Total Protein 05/14/2024 8.9 (H)  6.0 - 8.5 g/dL Final    Albumin 05/14/2024 4.2  3.5 - 5.2 g/dL Final    ALT (SGPT) 05/14/2024 11  1 - 33 U/L Final    Specimen hemolyzed.  Result may  be falsely elevated.    AST (SGOT) 05/14/2024 42 (H)  1 - 32 U/L Final    Alkaline Phosphatase 05/14/2024 57  39 - 117 U/L Final    Total Bilirubin 05/14/2024 0.2  0.0 - 1.2 mg/dL Final    Globulin 05/14/2024 4.7  gm/dL Final    A/G Ratio 05/14/2024 0.9  g/dL Final    BUN/Creatinine Ratio 05/14/2024 20.3  7.0 - 25.0 Final    Anion Gap 05/14/2024 10.6  5.0 - 15.0 mmol/L Final    eGFR 05/14/2024 109.2  >60.0 mL/min/1.73 Final    Protime 05/14/2024 13.0  12.1 - 14.7 Seconds Final    INR 05/14/2024 0.97  0.90 - 1.10 Final    PTT 05/14/2024 29.5  26.5 - 34.5 seconds Final    Lipase 05/14/2024 19  13 - 60 U/L Final    Amylase 05/14/2024 54  28 - 100 U/L Final    Color, UA 05/14/2024 Yellow  Yellow, Straw Final    Appearance, UA 05/14/2024 Clear  Clear Final    pH, UA 05/14/2024 6.5  5.0 - 8.0 Final    Specific Gravity, UA 05/14/2024 >1.030 (H)  1.005 - 1.030 Final    Glucose, UA 05/14/2024 Negative  Negative Final    Ketones, UA 05/14/2024 Trace (A)  Negative Final     Bilirubin, UA 05/14/2024 Negative  Negative Final    Blood, UA 05/14/2024 Large (3+) (A)  Negative Final    Protein, UA 05/14/2024 Negative  Negative Final    Leuk Esterase, UA 05/14/2024 Negative  Negative Final    Nitrite, UA 05/14/2024 Negative  Negative Final    Urobilinogen, UA 05/14/2024 0.2 E.U./dL  0.2 - 1.0 E.U./dL Final    Lactate 05/14/2024 0.6  0.5 - 2.0 mmol/L Final    C-Reactive Protein 05/14/2024 0.44  0.00 - 0.50 mg/dL Final    Magnesium 05/14/2024 1.7  1.6 - 2.6 mg/dL Final    D-Dimer, Quantitative 05/14/2024 0.61 (H)  0.00 - 0.50 MCGFEU/mL Final    WBC 05/14/2024 4.42  3.40 - 10.80 10*3/mm3 Final    RBC 05/14/2024 4.49  3.77 - 5.28 10*6/mm3 Final    Hemoglobin 05/14/2024 12.9  12.0 - 15.9 g/dL Final    Hematocrit 05/14/2024 39.1  34.0 - 46.6 % Final    MCV 05/14/2024 87.1  79.0 - 97.0 fL Final    MCH 05/14/2024 28.7  26.6 - 33.0 pg Final    MCHC 05/14/2024 33.0  31.5 - 35.7 g/dL Final    RDW 05/14/2024 15.1  12.3 - 15.4 % Final    RDW-SD 05/14/2024 48.2  37.0 - 54.0 fl Final    MPV 05/14/2024 9.7  6.0 - 12.0 fL Final    Platelets 05/14/2024 195  140 - 450 10*3/mm3 Final    Neutrophil % 05/14/2024 31.3 (L)  42.7 - 76.0 % Final    Lymphocyte % 05/14/2024 48.4 (H)  19.6 - 45.3 % Final    Monocyte % 05/14/2024 17.6 (H)  5.0 - 12.0 % Final    Eosinophil % 05/14/2024 1.8  0.3 - 6.2 % Final    Basophil % 05/14/2024 0.9  0.0 - 1.5 % Final    Immature Grans % 05/14/2024 0.0  0.0 - 0.5 % Final    Neutrophils, Absolute 05/14/2024 1.38 (L)  1.70 - 7.00 10*3/mm3 Final    Lymphocytes, Absolute 05/14/2024 2.14  0.70 - 3.10 10*3/mm3 Final    Monocytes, Absolute 05/14/2024 0.78  0.10 - 0.90 10*3/mm3 Final    Eosinophils, Absolute 05/14/2024 0.08  0.00 - 0.40 10*3/mm3 Final    Basophils, Absolute 05/14/2024 0.04  0.00 - 0.20 10*3/mm3 Final    Immature Grans, Absolute 05/14/2024 0.00  0.00 - 0.05 10*3/mm3 Final    nRBC 05/14/2024 0.0  0.0 - 0.2 /100 WBC Final    Extra Tube 05/14/2024 Hold for add-ons.   Final    Auto  resulted.    Extra Tube 05/14/2024 hold for add-on   Final    Auto resulted    Extra Tube 05/14/2024 Hold for add-ons.   Final    Auto resulted.    Extra Tube 05/14/2024 Hold for add-ons.   Final    Auto resulted    HCG Qualitative 05/14/2024 Negative  Negative Final    RBC, UA 05/14/2024 6-10 (A)  None Seen, 0-2 /HPF Final    WBC, UA 05/14/2024 0-2  None Seen, 0-2 /HPF Final    Urine culture not indicated.    Bacteria, UA 05/14/2024 None Seen  None Seen /HPF Final    Squamous Epithelial Cells, UA 05/14/2024 3-6 (A)  None Seen, 0-2 /HPF Final    Hyaline Casts, UA 05/14/2024 None Seen  None Seen /LPF Final    Methodology 05/14/2024 Automated Microscopy   Final   Office Visit on 02/05/2024   Component Date Value Ref Range Status    External Amphetamine Screen Urine 02/05/2024 Positive (A)   Final    External Benzodiazepine Screen Uri* 02/05/2024 Negative   Final    External Cocaine Screen Urine 02/05/2024 Negative   Final    External THC Screen Urine 02/05/2024 Positive (A)   Final    External Methadone Screen Urine 02/05/2024 Negative   Final    External Methamphetamine Screen Ur* 02/05/2024 Negative   Final    External Oxycodone Screen Urine 02/05/2024 Negative   Final    External Buprenorphine Screen Urine 02/05/2024 Positive (A)   Final    External MDMA 02/05/2024 Negative   Final    External Opiates Screen Urine 02/05/2024 Negative   Final       Assessment and Plan  Diagnoses and all orders for this visit:    1. Muscle pain (Primary)  -     Myositis Panel III Plus; Future  -     CK; Future  -     TSH Rfx On Abnormal To Free T4; Future  -     C-reactive protein; Future  -     predniSONE (DELTASONE) 20 MG tablet; Take 2 tablets by mouth Daily.  Dispense: 10 tablet; Refill: 0    2. Thyroiditis  -     levothyroxine (SYNTHROID, LEVOTHROID) 75 MCG tablet; Take 1 tablet by mouth Daily.  Dispense: 90 tablet; Refill: 0    3. Hypokalemia  -     Basic metabolic panel; Future    4. Chronic idiopathic constipation  -      lactulose (CHRONULAC) 10 GM/15ML solution; Take 30 mL by mouth 2 (Two) Times a Day.  Dispense: 473 mL; Refill: 0    5. Complex ovarian cyst    Patient has bilateral hip flexor weakness, pain and elevated inflammatory markers. On my differential is myositis. There are no rashes. I will try steroids and expect rapid improvement. Will also check myositis panel. Will reevaluate in 4 days.   Patient has a thyroid goiter, no compressive symptoms, history of hashimotos. Will do thyroid US in the future to further classify.   Patient had severe hypokalemia, will recheck BMP.   Patient has constipation despite miralax, will start lactulose.  Patient has a 1.7cm ovarian cyst, which may be hemorrhagic. May explain the patients pain. Patient has follow up with OBGYN, and will need repeat US. No evidence of torsion, doppler flow is preserved.             New Medications:   New Medications Ordered This Visit   Medications    levothyroxine (SYNTHROID, LEVOTHROID) 75 MCG tablet     Sig: Take 1 tablet by mouth Daily.     Dispense:  90 tablet     Refill:  0    predniSONE (DELTASONE) 20 MG tablet     Sig: Take 2 tablets by mouth Daily.     Dispense:  10 tablet     Refill:  0    lactulose (CHRONULAC) 10 GM/15ML solution     Sig: Take 30 mL by mouth 2 (Two) Times a Day.     Dispense:  473 mL     Refill:  0       Discontinued Medications:   Medications Discontinued During This Encounter   Medication Reason    levothyroxine (SYNTHROID, LEVOTHROID) 75 MCG tablet Reorder                 Follow Up:   Return Follow up with me on Friday.    Patient was given instructions and counseling regarding her condition or for health maintenance advice. Please see specific information pulled into the AVS if appropriate.       This document has been electronically signed by Javad Navarrete DO   May 29, 2024 09:20 EDT    Dictated Utilizing Dragon Dictation: Part of this note may be an electronic transcription/translation of spoken language to printed  text using the Dragon Dictation System.

## 2024-05-30 ENCOUNTER — OFFICE VISIT (OUTPATIENT)
Dept: FAMILY MEDICINE CLINIC | Facility: CLINIC | Age: 49
End: 2024-05-30
Payer: COMMERCIAL

## 2024-05-30 VITALS
TEMPERATURE: 97.7 F | SYSTOLIC BLOOD PRESSURE: 120 MMHG | BODY MASS INDEX: 28.88 KG/M2 | OXYGEN SATURATION: 98 % | HEIGHT: 67 IN | HEART RATE: 91 BPM | WEIGHT: 184 LBS | DIASTOLIC BLOOD PRESSURE: 70 MMHG

## 2024-05-30 DIAGNOSIS — Z12.31 ENCOUNTER FOR SCREENING MAMMOGRAM FOR MALIGNANT NEOPLASM OF BREAST: ICD-10-CM

## 2024-05-30 DIAGNOSIS — R11.2 NAUSEA AND VOMITING, UNSPECIFIED VOMITING TYPE: Primary | ICD-10-CM

## 2024-05-30 PROCEDURE — 99213 OFFICE O/P EST LOW 20 MIN: CPT | Performed by: FAMILY MEDICINE

## 2024-05-30 PROCEDURE — 1159F MED LIST DOCD IN RCRD: CPT | Performed by: FAMILY MEDICINE

## 2024-05-30 PROCEDURE — 1160F RVW MEDS BY RX/DR IN RCRD: CPT | Performed by: FAMILY MEDICINE

## 2024-05-30 PROCEDURE — 1125F AMNT PAIN NOTED PAIN PRSNT: CPT | Performed by: FAMILY MEDICINE

## 2024-05-30 NOTE — PROGRESS NOTES
"Chief Complaint  Vomiting    Krystal Rivas presents to [facility]  Vomiting   This is a new problem. The current episode started 1 to 4 weeks ago. The problem occurs 2 to 4 times per day. The problem has been waxing and waning. The emesis has an appearance of stomach contents and bile. Associated symptoms include abdominal pain and diarrhea. She has tried acetaminophen, bed rest and increased fluids for the symptoms. The treatment provided no relief.   Has been to the ER 3 different times over the past couple weeks.  Has had negative workups each time.  Only revealed hemorrhagic cyst that was resolving.  States her bleeding has stopped however the abdominal pain back pain nausea and vomiting has not stopped.  She was previously on Ozempic she had stopped previously and recently restarted 3 weeks ago and instructed the patient to stop the Ozempic.    Review of Systems   Gastrointestinal:  Positive for abdominal pain, diarrhea and vomiting.         Objective   Vital Signs:   /70 (BP Location: Right arm, Patient Position: Sitting, Cuff Size: Adult)   Pulse 91   Temp 97.7 °F (36.5 °C) (Temporal)   Ht 170.2 cm (67.01\")   Wt 83.5 kg (184 lb)   SpO2 98%   BMI 28.81 kg/m²     Physical Exam  Constitutional:       General: She is not in acute distress.     Appearance: Normal appearance. She is well-developed and well-groomed. She is not ill-appearing, toxic-appearing or diaphoretic.   HENT:      Head: Normocephalic.      Nose: Nose normal. No congestion or rhinorrhea.      Mouth/Throat:      Mouth: Mucous membranes are moist.      Pharynx: Oropharynx is clear. No oropharyngeal exudate or posterior oropharyngeal erythema.   Eyes:      General: Lids are normal.         Right eye: No discharge.         Left eye: No discharge.      Extraocular Movements: Extraocular movements intact.      Pupils: Pupils are equal, round, and reactive to light.   Neck:      " Vascular: No carotid bruit.   Cardiovascular:      Rate and Rhythm: Normal rate and regular rhythm.      Pulses: Normal pulses.      Heart sounds: Normal heart sounds. No murmur heard.     No friction rub. No gallop.   Pulmonary:      Effort: Pulmonary effort is normal. No respiratory distress.      Breath sounds: Normal breath sounds. No stridor. No wheezing, rhonchi or rales.   Chest:      Chest wall: No tenderness.   Abdominal:      General: Bowel sounds are normal. There is no distension.      Palpations: Abdomen is soft. There is no mass.      Tenderness: There is no abdominal tenderness. There is no right CVA tenderness, left CVA tenderness, guarding or rebound.      Hernia: No hernia is present.   Musculoskeletal:         General: No swelling or tenderness. Normal range of motion.      Cervical back: Normal range of motion and neck supple. No rigidity or tenderness.      Right lower leg: No edema.      Left lower leg: No edema.   Lymphadenopathy:      Cervical: No cervical adenopathy.   Skin:     General: Skin is warm.      Capillary Refill: Capillary refill takes less than 2 seconds.      Coloration: Skin is not jaundiced.      Findings: No bruising, erythema or rash.   Neurological:      General: No focal deficit present.      Mental Status: She is alert and oriented to person, place, and time.      Motor: Motor function is intact. No weakness.      Coordination: Coordination is intact.      Gait: Gait is intact. Gait normal.   Psychiatric:         Attention and Perception: Attention normal.         Mood and Affect: Mood normal.         Speech: Speech normal.         Behavior: Behavior normal.         Cognition and Memory: Cognition normal.         Judgment: Judgment normal.        Result Review :                 Assessment and Plan    Diagnoses and all orders for this visit:    1. Nausea and vomiting, unspecified vomiting type (Primary)  Comments:  Brat diet.  Continue NSAIDs as needed  Orders:  -     NM  HIDA SCAN WITHOUT PHARMACOLOGICAL INTERVENTION; Future    2. Encounter for screening mammogram for malignant neoplasm of breast  -     Mammo Screening Digital Tomosynthesis Bilateral With CAD; Future      Patient's Body mass index is 28.81 kg/m². indicating that she is overweight (BMI 25-29.9). Patient's (Body mass index is 28.81 kg/m².) indicates that they are overweight with health conditions that include GERD . Weight is unchanged. BMI is is above average; BMI management plan is completed. We discussed low calorie, low carb based diet program, portion control, and increasing exercise. .    Follow Up   Return if symptoms worsen or fail to improve.  Patient was given instructions and counseling regarding her condition or for health maintenance advice. Please see specific information pulled into the AVS if appropriate.     This document has been electronically signed by REBECCA Lopes  May 30, 2024 14:20 EDT

## 2024-06-13 LAB
EJ AB SER QL: NEGATIVE
ENA JO1 AB SER IA-ACNC: <20 UNITS
ENA PM/SCL AB SER-ACNC: <20 UNITS
ENA SS-A 52KD IGG SER IA-ACNC: <20 UNITS
FIBRILLARIN AB SER QL: NEGATIVE
KU AB SER QL: NEGATIVE
MDA5 AB SER LINE BLOT-ACNC: <20 UNITS
MI2 AB SER QL: NEGATIVE
MJ AB SER LINE BLOT-ACNC: <20 UNITS
OJ AB SER QL: NEGATIVE
PL12 AB SER QL: NEGATIVE
PL7 AB SER QL: NEGATIVE
SAE1 IGG SER QL LINE BLOT: <20 UNITS
SRP AB SERPL QL: NEGATIVE
TIF1-GAMMA AB SER IA-ACNC: <20 UNITS
U1 SNRNP AB SER IA-ACNC: <20 UNITS
U2 SNRNP AB SER QL: NEGATIVE

## 2024-06-22 DIAGNOSIS — F40.10 SOCIAL ANXIETY DISORDER: ICD-10-CM

## 2024-06-22 DIAGNOSIS — F41.1 GAD (GENERALIZED ANXIETY DISORDER): ICD-10-CM

## 2024-06-22 DIAGNOSIS — F43.12 CHRONIC POST-TRAUMATIC STRESS DISORDER (PTSD): ICD-10-CM

## 2024-06-24 RX ORDER — VENLAFAXINE HYDROCHLORIDE 150 MG/1
150 CAPSULE, EXTENDED RELEASE ORAL DAILY
Qty: 30 CAPSULE | Refills: 0 | Status: SHIPPED | OUTPATIENT
Start: 2024-06-24

## 2024-06-26 DIAGNOSIS — F43.12 CHRONIC POST-TRAUMATIC STRESS DISORDER (PTSD): ICD-10-CM

## 2024-06-26 DIAGNOSIS — F41.1 GAD (GENERALIZED ANXIETY DISORDER): ICD-10-CM

## 2024-06-26 RX ORDER — ARIPIPRAZOLE 10 MG/1
10 TABLET ORAL NIGHTLY
Qty: 30 TABLET | Refills: 0 | Status: SHIPPED | OUTPATIENT
Start: 2024-06-26

## 2024-07-08 ENCOUNTER — OFFICE VISIT (OUTPATIENT)
Dept: PSYCHIATRY | Facility: CLINIC | Age: 49
End: 2024-07-08
Payer: COMMERCIAL

## 2024-07-08 VITALS
HEART RATE: 93 BPM | OXYGEN SATURATION: 96 % | DIASTOLIC BLOOD PRESSURE: 80 MMHG | BODY MASS INDEX: 28.35 KG/M2 | HEIGHT: 67 IN | SYSTOLIC BLOOD PRESSURE: 120 MMHG | WEIGHT: 180.6 LBS

## 2024-07-08 DIAGNOSIS — F43.12 CHRONIC POST-TRAUMATIC STRESS DISORDER (PTSD): ICD-10-CM

## 2024-07-08 DIAGNOSIS — F41.0 PANIC ATTACKS: ICD-10-CM

## 2024-07-08 DIAGNOSIS — F41.1 GAD (GENERALIZED ANXIETY DISORDER): ICD-10-CM

## 2024-07-08 DIAGNOSIS — F40.10 SOCIAL ANXIETY DISORDER: ICD-10-CM

## 2024-07-08 PROCEDURE — 99214 OFFICE O/P EST MOD 30 MIN: CPT | Performed by: PSYCHIATRY & NEUROLOGY

## 2024-07-08 PROCEDURE — 1159F MED LIST DOCD IN RCRD: CPT | Performed by: PSYCHIATRY & NEUROLOGY

## 2024-07-08 PROCEDURE — 1160F RVW MEDS BY RX/DR IN RCRD: CPT | Performed by: PSYCHIATRY & NEUROLOGY

## 2024-07-08 RX ORDER — PANTOPRAZOLE SODIUM 40 MG/1
1 TABLET, DELAYED RELEASE ORAL DAILY
COMMUNITY
Start: 2024-06-24

## 2024-07-08 RX ORDER — ARIPIPRAZOLE 10 MG/1
10 TABLET ORAL NIGHTLY
Qty: 30 TABLET | Refills: 0 | Status: SHIPPED | OUTPATIENT
Start: 2024-07-08

## 2024-07-08 RX ORDER — CLONAZEPAM 0.5 MG/1
0.5 TABLET ORAL DAILY PRN
Qty: 30 TABLET | Refills: 2 | Status: SHIPPED | OUTPATIENT
Start: 2024-07-08

## 2024-07-08 RX ORDER — PROPRANOLOL HYDROCHLORIDE 20 MG/1
TABLET ORAL
Qty: 60 TABLET | Refills: 0 | Status: SHIPPED | OUTPATIENT
Start: 2024-07-08

## 2024-07-08 RX ORDER — VENLAFAXINE HYDROCHLORIDE 150 MG/1
150 CAPSULE, EXTENDED RELEASE ORAL DAILY
Qty: 30 CAPSULE | Refills: 0 | Status: SHIPPED | OUTPATIENT
Start: 2024-07-08

## 2024-07-08 NOTE — PROGRESS NOTES
Subjective   Meghan Rivas is a 48 y.o. female who presents today for follow up    Chief Complaint: History of bipolar disorder, PTSD, substance abuse, depression, anxiety    History of Present Illness: Patient presented today for follow-up.  Since last visit, she has continued to struggle with stress at home as her  continues to use despite ceasing use after last visit and doing well for a short period of time.  He again relapsed recently and has been hiding this from her and has missed work, missed family obligations, and has been obviously intoxicated on methamphetamine at night at times leading to stress and difficulty around the home.  Despite this, patient is coping very well.  She does have her stressful moments and has breakdowns at times but she has to keep moving forward for her daughter's and this has kept her from severe symptoms.  She had a meeting with [facility] today about her and her daughter is moving into a new place and continues to make her exit strategy from her .  She denies any medication side effects.  She denies SI/HI/AVH.      The following portions of the patient's history were reviewed and updated as appropriate: allergies, current medications, past family history, past medical history, past social history, past surgical history and problem list.      Past Medical History:  Past Medical History:   Diagnosis Date    Anxiety     Depression     Disease of thyroid gland     GERD (gastroesophageal reflux disease)     Hepatitis C     History of drug abuse     Hypertension     Seizures     LAST SEIZURE 2019       Social History:  Social History     Socioeconomic History    Marital status:    Tobacco Use    Smoking status: Every Day     Current packs/day: 0.25     Average packs/day: 0.3 packs/day for 3.5 years (0.9 ttl pk-yrs)     Types: Cigarettes    Smokeless tobacco: Never   Vaping Use    Vaping status: Former   Substance and Sexual Activity    Alcohol use: Not  Currently     Comment: sober 4 years    Drug use: Not Currently     Types: IV, Heroin, Methamphetamines     Comment: clean 5 years    Sexual activity: Yes     Partners: Male       Family History:  Family History   Adopted: Yes   Problem Relation Age of Onset    No Known Problems Adoptive Father     Behavior problems Adoptive Mother        Past Surgical History:  Past Surgical History:   Procedure Laterality Date    APPENDECTOMY N/A 3/8/2022    Procedure: APPENDECTOMY LAPAROSCOPIC;  Surgeon: Babar Perkins MD;  Location:  [facility] OR;  Service: General;  Laterality: N/A;    COLONOSCOPY N/A 10/5/2022    Procedure: COLONOSCOPY;  Surgeon: Neris Bear MD;  Location: [facility] OR;  Service: Gastroenterology;  Laterality: N/A;    OVARIAN CYST SURGERY      x7 surgeries       Problem List:  Patient Active Problem List   Diagnosis    Chronic hepatitis C without hepatic coma    MVP (mitral valve prolapse)    Hx of drug abuse    Leg neuralgia, right    Anxiety and depression    Gastroesophageal reflux disease    Syncope    Acute appendicitis    Chronic idiopathic constipation       Allergy:   Allergies   Allergen Reactions    Contrast Dye (Echo Or Unknown Ct/Mr) Anaphylaxis     Tolerated IV contrast CT 3/7/2022        Current Medications:   Current Outpatient Medications   Medication Sig Dispense Refill    albuterol sulfate  (90 Base) MCG/ACT inhaler Inhale 2 puffs Every 6 (Six) Hours As Needed for Wheezing. 6.7 g 2    ARIPiprazole (ABILIFY) 10 MG tablet TAKE 1 TABLET BY MOUTH ONCE DAILY AT NIGHT 30 tablet 0    chlorhexidine (HIBICLENS) 4 % external liquid Apply  topically to the appropriate area as directed Daily As Needed for Wound Care. 500 mL 1    clonazePAM (KlonoPIN) 0.5 MG tablet Take 1 tablet by mouth Daily As Needed for Anxiety. 30 tablet 2    levothyroxine (SYNTHROID, LEVOTHROID) 75 MCG tablet Take 1 tablet by mouth Daily. 90 tablet 0    naloxone (NARCAN) 4 MG/0.1ML nasal spray Call 911. Don't  prime. Spray in 1 nostril for overdose. Repeat in 2-3 minutes in other nostril if no or minimal breathing/responsiveness. 2 each 0    naproxen (NAPROSYN) 500 MG tablet Take 1 tablet by mouth 2 (Two) Times a Day As Needed for Moderate Pain. 20 tablet 0    omeprazole (PrilOSEC) 40 MG capsule Take 1 capsule by mouth Daily. 30 capsule 2    ondansetron (ZOFRAN) 4 MG tablet Take 2 tablets by mouth Every 12 (Twelve) Hours.      ondansetron ODT (ZOFRAN-ODT) 4 MG disintegrating tablet Take 1 tablet by mouth Every 8 (Eight) Hours As Needed for Nausea or Vomiting.      pantoprazole (PROTONIX) 40 MG EC tablet Take 1 tablet by mouth Daily.      potassium citrate (UROCIT-K) 5 MEQ (540 MG) CR tablet Take 20 mEq by mouth 2 (Two) Times a Day With Meals.      predniSONE (DELTASONE) 20 MG tablet Take 2 tablets by mouth Daily. 10 tablet 0    propranolol (INDERAL) 20 MG tablet TAKE 1/2 TO 1 (ONE-HALF TO ONE) TABLET BY MOUTH TWICE DAILY AS NEEDED FOR ANXIETY 60 tablet 0    venlafaxine XR (EFFEXOR-XR) 150 MG 24 hr capsule Take 1 capsule by mouth once daily 30 capsule 0     No current facility-administered medications for this visit.       Review of Symptoms:    Review of Systems   Constitutional:  Positive for activity change and fatigue.   HENT:  Negative for congestion and rhinorrhea.    Eyes:  Negative for blurred vision and visual disturbance.   Respiratory:  Positive for shortness of breath. Negative for cough.    Cardiovascular:  Positive for palpitations. Negative for chest pain.   Gastrointestinal:  Negative for nausea and vomiting.   Endocrine: Negative for cold intolerance and heat intolerance.   Genitourinary:  Negative for dysuria and frequency.   Musculoskeletal:  Negative for arthralgias and myalgias.   Skin:  Negative for rash and wound.   Allergic/Immunologic: Negative for environmental allergies and food allergies.   Neurological:  Positive for weakness. Negative for confusion.   Hematological:  Negative for adenopathy.  "Does not bruise/bleed easily.   Psychiatric/Behavioral:  Positive for decreased concentration, dysphoric mood and stress. Negative for agitation, sleep disturbance, suicidal ideas and depressed mood. The patient is nervous/anxious.          Physical Exam:   Blood pressure 120/80, pulse 93, height 170.2 cm (67.01\"), weight 81.9 kg (180 lb 9.6 oz), SpO2 96%, not currently breastfeeding.    Appearance: CF of stated age, NAD   Gait, Station, Strength: WNL    Mental Status Exam:     Mental Status exam performed 07/08/2024  and patient shows no significant changes from previous exam.     Hygiene:   good  Cooperation:  Cooperative  Eye Contact:  Good  Psychomotor Behavior:  Appropriate  Affect:  Full range  Mood: anxious, fluctates, and dysphoric  - managing well, situational   Hopelessness: Optimistic  Speech:  Normal  Thought Process:  Goal directed and Linear  Thought Content:  Normal and Mood congruent  Suicidal:  None, resolved  Homicidal:  None  Hallucinations:  None  Delusion:  None  Memory:  Intact  Orientation:  Person, Place, Time and Situation  Reliability:  good  Insight:  Fair  Judgement:  Good  Impulse Control:  Good      Lab Results:   No visits with results within 1 Month(s) from this visit.   Latest known visit with results is:   Lab on 05/28/2024   Component Date Value Ref Range Status    Anti-Mariam-1 Ab 05/28/2024 <20  <20 Units Final    Anti-PL-7 Ab 05/28/2024 Negative  Negative Final    Anti-PL-12 Ab 05/28/2024 Negative  Negative Final    Anti-EJ Ab 05/28/2024 Negative  Negative Final    Anti-OJ Ab 05/28/2024 Negative  Negative Final    Anti-SRP Ab  05/28/2024 Negative  Negative Final    ANTI-MI-2 AB (RDL) 05/28/2024 Negative  Negative Final    Anti-TIF-1gamma Ab  05/28/2024 <20  <20 Units Final    Anti-MDA-5 Ab (CADM-140) 05/28/2024 <20  <20 Units Final    Anti-NXP-2 (P140) Ab 05/28/2024 <20  <20 Units Final    ANTI-SAE1 AB, IGG  05/28/2024 <20  <20 Units Final    ANTI-PM/SCL-100 AB  05/28/2024 <20  <20 " Units Final    Anti-Ku Ab 05/28/2024 Negative  Negative Final    Anti-SS-A 52kD Ab, IgG 05/28/2024 <20  <20 Units Final    Anti-U1 RNP Ab  05/28/2024 <20  <20 Units Final    Anti-U2 RNP Ab  05/28/2024 Negative  Negative Final    Anti-U3 RNP (Fibrillarin) 05/28/2024 Negative  Negative Final        Interpretation for Anti-Mariam-1, Anti-TIF-1gamma,      Anti-MDA-5, Anti-NXP-2, Anti-SAE1, Anti-PM/Scl-100,      Anti-SS-A 52 kD, Anti-U1 RNP:          Negative:                                <20          Weak Positive:                       20 - 39          Moderate Positive:                   40 - 80          Strong Positive:                         >80    Creatine Kinase 05/28/2024 54  20 - 180 U/L Final    TSH 05/28/2024 5.490 (H)  0.270 - 4.200 uIU/mL Final    C-Reactive Protein 05/28/2024 0.30  0.00 - 0.50 mg/dL Final    Glucose 05/28/2024 88  65 - 99 mg/dL Final    BUN 05/28/2024 17  6 - 20 mg/dL Final    Creatinine 05/28/2024 0.73  0.57 - 1.00 mg/dL Final    Sodium 05/28/2024 143  136 - 145 mmol/L Final    Potassium 05/28/2024 4.4  3.5 - 5.2 mmol/L Final    Chloride 05/28/2024 105  98 - 107 mmol/L Final    CO2 05/28/2024 26.1  22.0 - 29.0 mmol/L Final    Calcium 05/28/2024 10.6 (H)  8.6 - 10.5 mg/dL Final    BUN/Creatinine Ratio 05/28/2024 23.3  7.0 - 25.0 Final    Anion Gap 05/28/2024 11.9  5.0 - 15.0 mmol/L Final    eGFR 05/28/2024 101.6  >60.0 mL/min/1.73 Final    Free T4 05/28/2024 0.86 (L)  0.93 - 1.70 ng/dL Final       Assessment & Plan   Diagnoses and all orders for this visit:    1. AMARIS (generalized anxiety disorder)  -     ARIPiprazole (ABILIFY) 10 MG tablet; Take 1 tablet by mouth Every Night.  Dispense: 30 tablet; Refill: 0  -     clonazePAM (KlonoPIN) 0.5 MG tablet; Take 1 tablet by mouth Daily As Needed for Anxiety.  Dispense: 30 tablet; Refill: 2  -     venlafaxine XR (EFFEXOR-XR) 150 MG 24 hr capsule; Take 1 capsule by mouth Daily.  Dispense: 30 capsule; Refill: 0  -     propranolol (INDERAL) 20 MG  tablet; TAKE 1/2 TO 1 (ONE-HALF TO ONE) TABLET BY MOUTH TWICE DAILY AS NEEDED FOR ANXIETY  Dispense: 60 tablet; Refill: 0    2. Chronic post-traumatic stress disorder (PTSD)  -     ARIPiprazole (ABILIFY) 10 MG tablet; Take 1 tablet by mouth Every Night.  Dispense: 30 tablet; Refill: 0  -     clonazePAM (KlonoPIN) 0.5 MG tablet; Take 1 tablet by mouth Daily As Needed for Anxiety.  Dispense: 30 tablet; Refill: 2  -     venlafaxine XR (EFFEXOR-XR) 150 MG 24 hr capsule; Take 1 capsule by mouth Daily.  Dispense: 30 capsule; Refill: 0    3. Panic attacks  -     clonazePAM (KlonoPIN) 0.5 MG tablet; Take 1 tablet by mouth Daily As Needed for Anxiety.  Dispense: 30 tablet; Refill: 2    4. Social anxiety disorder  -     venlafaxine XR (EFFEXOR-XR) 150 MG 24 hr capsule; Take 1 capsule by mouth Daily.  Dispense: 30 capsule; Refill: 0  -     propranolol (INDERAL) 20 MG tablet; TAKE 1/2 TO 1 (ONE-HALF TO ONE) TABLET BY MOUTH TWICE DAILY AS NEEDED FOR ANXIETY  Dispense: 60 tablet; Refill: 0        -Patient is still having increased stressors at home but continues to manage very well and is staying as even and appropriate as she can given the circumstances.  -Reviewed previous available documentation  -Reviewed most recent available labs   -NALINI reviewed and appropriate. Patient counseled on use of controlled substances.   -Continue Abilify 10 mg p.o. nightly for mood stabilization and anxiety  -Continue Effexor  mg p.o. daily for mood and anxiety  -Continue propranolol 10 to 20 mg p.o. as needed 30 minutes to an hour prior to anxiety provoking event for situational and social anxiety  -Continue clonazepam 0.5 mg p.o. daily as needed for anxiety or panic, this may give her better baseline control of symptoms and has a longer half-life so the hope is to avoid panic spells throughout the day while she is working  -Encourage cessation of nicotine  -Encourage cessation of cannabis  -Encouraged continued cessation of  methamphetamine  -Encouraged to continue with therapy      Visit Diagnoses:    ICD-10-CM ICD-9-CM   1. AMARIS (generalized anxiety disorder)  F41.1 300.02   2. Chronic post-traumatic stress disorder (PTSD)  F43.12 309.81   3. Panic attacks  F41.0 300.01   4. Social anxiety disorder  F40.10 300.23           TREATMENT PLAN/GOALS: Continue supportive psychotherapy efforts and medications as indicated. Treatment and medication options discussed during today's visit. Patient acknowledged and verbally consented to continue with current treatment plan and was educated on the importance of compliance with treatment and follow-up appointments.    MEDICATION ISSUES:    Discussed medication options and treatment plan of prescribed medication as well as the risks, benefits, and side effects including potential falls, possible impaired driving and metabolic adversities among others. Patient is agreeable to call the office with any worsening of symptoms or onset of side effects. Patient is agreeable to call 911 or go to the nearest ER should he/she begin having SI/HI.     MEDS ORDERED DURING VISIT:  New Medications Ordered This Visit   Medications    ARIPiprazole (ABILIFY) 10 MG tablet     Sig: Take 1 tablet by mouth Every Night.     Dispense:  30 tablet     Refill:  0    clonazePAM (KlonoPIN) 0.5 MG tablet     Sig: Take 1 tablet by mouth Daily As Needed for Anxiety.     Dispense:  30 tablet     Refill:  2    venlafaxine XR (EFFEXOR-XR) 150 MG 24 hr capsule     Sig: Take 1 capsule by mouth Daily.     Dispense:  30 capsule     Refill:  0    propranolol (INDERAL) 20 MG tablet     Sig: TAKE 1/2 TO 1 (ONE-HALF TO ONE) TABLET BY MOUTH TWICE DAILY AS NEEDED FOR ANXIETY     Dispense:  60 tablet     Refill:  0       Return in about 3 months (around 10/8/2024).             This document has been electronically signed by Giacomo Phipps MD  July 8, 2024 15:03 EDT    Dictated using Dragon Dictation.

## 2024-07-25 ENCOUNTER — OFFICE VISIT (OUTPATIENT)
Dept: PSYCHIATRY | Facility: CLINIC | Age: 49
End: 2024-07-25
Payer: COMMERCIAL

## 2024-07-25 VITALS
DIASTOLIC BLOOD PRESSURE: 80 MMHG | SYSTOLIC BLOOD PRESSURE: 118 MMHG | WEIGHT: 181.8 LBS | BODY MASS INDEX: 28.53 KG/M2 | OXYGEN SATURATION: 95 % | HEIGHT: 67 IN | HEART RATE: 94 BPM

## 2024-07-25 DIAGNOSIS — F41.1 GAD (GENERALIZED ANXIETY DISORDER): ICD-10-CM

## 2024-07-25 DIAGNOSIS — Z79.899 MEDICATION MANAGEMENT: Primary | ICD-10-CM

## 2024-07-25 DIAGNOSIS — F43.12 NIGHTMARES ASSOCIATED WITH CHRONIC POST-TRAUMATIC STRESS DISORDER: ICD-10-CM

## 2024-07-25 DIAGNOSIS — F43.12 CHRONIC POST-TRAUMATIC STRESS DISORDER (PTSD): ICD-10-CM

## 2024-07-25 DIAGNOSIS — F51.5 NIGHTMARES ASSOCIATED WITH CHRONIC POST-TRAUMATIC STRESS DISORDER: ICD-10-CM

## 2024-07-25 DIAGNOSIS — F41.0 PANIC ATTACKS: ICD-10-CM

## 2024-07-25 LAB
EXTERNAL AMPHETAMINE SCREEN URINE: POSITIVE
EXTERNAL BENZODIAZEPINE SCREEN URINE: POSITIVE
EXTERNAL BUPRENORPHINE SCREEN URINE: POSITIVE
EXTERNAL COCAINE SCREEN URINE: NEGATIVE
EXTERNAL MDMA: NEGATIVE
EXTERNAL METHADONE SCREEN URINE: NEGATIVE
EXTERNAL METHAMPHETAMINE SCREEN URINE: NEGATIVE
EXTERNAL OPIATES SCREEN URINE: NEGATIVE
EXTERNAL OXYCODONE SCREEN URINE: NEGATIVE
EXTERNAL THC SCREEN URINE: POSITIVE

## 2024-07-25 RX ORDER — CLONAZEPAM 0.5 MG/1
0.5 TABLET ORAL 2 TIMES DAILY PRN
Qty: 60 TABLET | Refills: 0 | Status: SHIPPED | OUTPATIENT
Start: 2024-07-25
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
## 2024-08-21 DIAGNOSIS — F40.10 SOCIAL ANXIETY DISORDER: ICD-10-CM

## 2024-08-21 DIAGNOSIS — F43.12 CHRONIC POST-TRAUMATIC STRESS DISORDER (PTSD): ICD-10-CM

## 2024-08-21 DIAGNOSIS — F41.1 GAD (GENERALIZED ANXIETY DISORDER): ICD-10-CM

## 2024-08-21 RX ORDER — VENLAFAXINE HYDROCHLORIDE 150 MG/1
150 CAPSULE, EXTENDED RELEASE ORAL DAILY
Qty: 30 CAPSULE | Refills: 0 | Status: SHIPPED | OUTPATIENT
Start: 2024-08-21

## 2024-08-26 DIAGNOSIS — F41.1 GAD (GENERALIZED ANXIETY DISORDER): ICD-10-CM

## 2024-08-26 DIAGNOSIS — F40.10 SOCIAL ANXIETY DISORDER: ICD-10-CM

## 2024-08-26 DIAGNOSIS — F43.12 CHRONIC POST-TRAUMATIC STRESS DISORDER (PTSD): ICD-10-CM

## 2024-08-27 RX ORDER — PROPRANOLOL HYDROCHLORIDE 20 MG/1
TABLET ORAL
Qty: 60 TABLET | Refills: 0 | Status: SHIPPED | OUTPATIENT
Start: 2024-08-27

## 2024-08-27 RX ORDER — ARIPIPRAZOLE 10 MG/1
10 TABLET ORAL NIGHTLY
Qty: 30 TABLET | Refills: 0 | Status: SHIPPED | OUTPATIENT
Start: 2024-08-27

## 2024-09-20 DIAGNOSIS — F43.12 CHRONIC POST-TRAUMATIC STRESS DISORDER (PTSD): ICD-10-CM

## 2024-09-20 DIAGNOSIS — F41.1 GAD (GENERALIZED ANXIETY DISORDER): ICD-10-CM

## 2024-09-20 RX ORDER — ARIPIPRAZOLE 10 MG/1
10 TABLET ORAL NIGHTLY
Qty: 30 TABLET | Refills: 0 | Status: SHIPPED | OUTPATIENT
Start: 2024-09-20

## 2024-09-21 DIAGNOSIS — F41.1 GAD (GENERALIZED ANXIETY DISORDER): ICD-10-CM

## 2024-09-21 DIAGNOSIS — F43.12 CHRONIC POST-TRAUMATIC STRESS DISORDER (PTSD): ICD-10-CM

## 2024-09-21 DIAGNOSIS — F40.10 SOCIAL ANXIETY DISORDER: ICD-10-CM

## 2024-09-23 RX ORDER — VENLAFAXINE HYDROCHLORIDE 150 MG/1
150 CAPSULE, EXTENDED RELEASE ORAL DAILY
Qty: 30 CAPSULE | Refills: 0 | Status: SHIPPED | OUTPATIENT
Start: 2024-09-23

## 2024-10-07 ENCOUNTER — OFFICE VISIT (OUTPATIENT)
Dept: PSYCHIATRY | Facility: CLINIC | Age: 49
End: 2024-10-07
Payer: COMMERCIAL

## 2024-10-07 VITALS
BODY MASS INDEX: 26.65 KG/M2 | DIASTOLIC BLOOD PRESSURE: 80 MMHG | WEIGHT: 169.8 LBS | HEART RATE: 87 BPM | HEIGHT: 67 IN | OXYGEN SATURATION: 97 % | SYSTOLIC BLOOD PRESSURE: 122 MMHG

## 2024-10-07 DIAGNOSIS — F41.0 PANIC ATTACKS: ICD-10-CM

## 2024-10-07 DIAGNOSIS — Z79.899 MEDICATION MANAGEMENT: ICD-10-CM

## 2024-10-07 DIAGNOSIS — F41.1 GAD (GENERALIZED ANXIETY DISORDER): Primary | ICD-10-CM

## 2024-10-07 DIAGNOSIS — F43.12 CHRONIC POST-TRAUMATIC STRESS DISORDER (PTSD): ICD-10-CM

## 2024-10-07 DIAGNOSIS — F40.10 SOCIAL ANXIETY DISORDER: ICD-10-CM

## 2024-10-07 LAB
EXTERNAL AMPHETAMINE SCREEN URINE: NEGATIVE
EXTERNAL BENZODIAZEPINE SCREEN URINE: NEGATIVE
EXTERNAL BUPRENORPHINE SCREEN URINE: NEGATIVE
EXTERNAL COCAINE SCREEN URINE: NEGATIVE
EXTERNAL MDMA: NEGATIVE
EXTERNAL METHADONE SCREEN URINE: NEGATIVE
EXTERNAL METHAMPHETAMINE SCREEN URINE: NEGATIVE
EXTERNAL OPIATES SCREEN URINE: NEGATIVE
EXTERNAL OXYCODONE SCREEN URINE: NEGATIVE
EXTERNAL THC SCREEN URINE: NEGATIVE

## 2024-10-07 PROCEDURE — 1160F RVW MEDS BY RX/DR IN RCRD: CPT | Performed by: PSYCHIATRY & NEUROLOGY

## 2024-10-07 PROCEDURE — 1159F MED LIST DOCD IN RCRD: CPT | Performed by: PSYCHIATRY & NEUROLOGY

## 2024-10-07 PROCEDURE — 99214 OFFICE O/P EST MOD 30 MIN: CPT | Performed by: PSYCHIATRY & NEUROLOGY

## 2024-10-07 RX ORDER — VENLAFAXINE HYDROCHLORIDE 150 MG/1
150 CAPSULE, EXTENDED RELEASE ORAL DAILY
Qty: 30 CAPSULE | Refills: 2 | Status: SHIPPED | OUTPATIENT
Start: 2024-10-07

## 2024-10-07 RX ORDER — PROPRANOLOL HCL 20 MG
TABLET ORAL
Qty: 60 TABLET | Refills: 2 | Status: SHIPPED | OUTPATIENT
Start: 2024-10-07

## 2024-10-07 RX ORDER — CLONAZEPAM 0.5 MG/1
0.5 TABLET ORAL 2 TIMES DAILY PRN
Qty: 60 TABLET | Refills: 2 | Status: SHIPPED | OUTPATIENT
Start: 2024-10-07

## 2024-10-07 RX ORDER — ARIPIPRAZOLE 10 MG/1
10 TABLET ORAL NIGHTLY
Qty: 30 TABLET | Refills: 2 | Status: SHIPPED | OUTPATIENT
Start: 2024-10-07

## 2024-10-07 NOTE — PROGRESS NOTES
Subjective   Meghan Rivas is a 48 y.o. female who presents today for follow up    Chief Complaint: History of bipolar disorder, PTSD, substance abuse, depression, anxiety    History of Present Illness: Patient presented today for follow-up.  Since last visit, patient continues to do fairly well.  She continues to live with her  who continues to use and spend money.  He is starting to have more noticeable erratic behavior.  Other people and neighbors have asked about him.  She has not heard back from [facility] yet as she has to get her birth certificates sent in.  She has been managing the stress of this relatively well and is not having any medication side effects.  She remains in good spirits.  She does not get into arguments.  She continues to have an exit strategy.  She denies SI/HI/AVH.  Despite the stress and anxiety, she is coping well and is not having any overt depressive or anxious symptoms.      The following portions of the patient's history were reviewed and updated as appropriate: allergies, current medications, past family history, past medical history, past social history, past surgical history and problem list.      Past Medical History:  Past Medical History:   Diagnosis Date    Anxiety     Depression     Disease of thyroid gland     GERD (gastroesophageal reflux disease)     Hepatitis C     History of drug abuse     Hypertension     Seizures     LAST SEIZURE 2019       Social History:  Social History     Socioeconomic History    Marital status:    Tobacco Use    Smoking status: Every Day     Current packs/day: 0.25     Average packs/day: 0.3 packs/day for 3.5 years (0.9 ttl pk-yrs)     Types: Cigarettes    Smokeless tobacco: Never   Vaping Use    Vaping status: Former   Substance and Sexual Activity    Alcohol use: Not Currently     Comment: sober 4 years    Drug use: Not Currently     Types: IV, Heroin, Methamphetamines     Comment: clean 5 years    Sexual activity: Yes     Partners: Male        Family History:  Family History   Adopted: Yes   Problem Relation Age of Onset    No Known Problems Adoptive Father     Behavior problems Adoptive Mother        Past Surgical History:  Past Surgical History:   Procedure Laterality Date    APPENDECTOMY N/A 3/8/2022    Procedure: APPENDECTOMY LAPAROSCOPIC;  Surgeon: Babar Perkins MD;  Location: [facility];  Service: General;  Laterality: N/A;    COLONOSCOPY N/A 10/5/2022    Procedure: COLONOSCOPY;  Surgeon: Neris Bear MD;  Location: [facility];  Service: Gastroenterology;  Laterality: N/A;    OVARIAN CYST SURGERY      x7 surgeries       Problem List:  Patient Active Problem List   Diagnosis    Chronic hepatitis C without hepatic coma    MVP (mitral valve prolapse)    Hx of drug abuse    Leg neuralgia, right    Anxiety and depression    Gastroesophageal reflux disease    Syncope    Acute appendicitis    Chronic idiopathic constipation       Allergy:   Allergies   Allergen Reactions    Contrast Dye (Echo Or Unknown Ct/Mr) Anaphylaxis     Tolerated IV contrast CT 3/7/2022        Current Medications:   Current Outpatient Medications   Medication Sig Dispense Refill    albuterol sulfate  (90 Base) MCG/ACT inhaler Inhale 2 puffs Every 6 (Six) Hours As Needed for Wheezing. 6.7 g 2    ARIPiprazole (ABILIFY) 10 MG tablet TAKE 1 TABLET BY MOUTH ONCE DAILY AT NIGHT 30 tablet 0    chlorhexidine (HIBICLENS) 4 % external liquid Apply  topically to the appropriate area as directed Daily As Needed for Wound Care. 500 mL 1    clonazePAM (KlonoPIN) 0.5 MG tablet Take 1 tablet by mouth 2 (Two) Times a Day As Needed for Anxiety. 60 tablet 0    levothyroxine (SYNTHROID, LEVOTHROID) 75 MCG tablet Take 1 tablet by mouth Daily. 90 tablet 0    naloxone (NARCAN) 4 MG/0.1ML nasal spray Call 911. Don't prime. [place] in 1 nostril for overdose. Repeat in 2-3 minutes in other nostril if no or minimal breathing/responsiveness. 2 each 0    naproxen (NAPROSYN) 500 MG  tablet Take 1 tablet by mouth 2 (Two) Times a Day As Needed for Moderate Pain. 20 tablet 0    omeprazole (PrilOSEC) 40 MG capsule Take 1 capsule by mouth Daily. 30 capsule 2    ondansetron (ZOFRAN) 4 MG tablet Take 2 tablets by mouth Every 12 (Twelve) Hours.      ondansetron ODT (ZOFRAN-ODT) 4 MG disintegrating tablet Take 1 tablet by mouth Every 8 (Eight) Hours As Needed for Nausea or Vomiting.      pantoprazole (PROTONIX) 40 MG EC tablet Take 1 tablet by mouth Daily.      potassium citrate (UROCIT-K) 5 MEQ (540 MG) CR tablet Take 20 mEq by mouth 2 (Two) Times a Day With Meals.      predniSONE (DELTASONE) 20 MG tablet Take 2 tablets by mouth Daily. 10 tablet 0    propranolol (INDERAL) 20 MG tablet TAKE 1/2 TO 1 (ONE-HALF TO ONE) TABLET BY MOUTH TWICE DAILY AS NEEDED FOR ANXIETY 60 tablet 0    venlafaxine XR (EFFEXOR-XR) 150 MG 24 hr capsule Take 1 capsule by mouth once daily 30 capsule 0     No current facility-administered medications for this visit.       Review of Symptoms:    Review of Systems   Constitutional:  Positive for activity change and fatigue.   HENT:  Negative for congestion and rhinorrhea.    Eyes:  Negative for blurred vision and visual disturbance.   Respiratory:  Positive for shortness of breath. Negative for cough.    Cardiovascular:  Positive for palpitations. Negative for chest pain.   Gastrointestinal:  Negative for nausea and vomiting.   Endocrine: Negative for cold intolerance and heat intolerance.   Genitourinary:  Negative for dysuria and frequency.   Musculoskeletal:  Negative for arthralgias and myalgias.   Skin:  Negative for rash and wound.   Allergic/Immunologic: Negative for environmental allergies and food allergies.   Neurological:  Positive for weakness. Negative for confusion.   Hematological:  Negative for adenopathy. Does not bruise/bleed easily.   Psychiatric/Behavioral:  Positive for decreased concentration, dysphoric mood and stress. Negative for agitation, sleep  "disturbance, suicidal ideas and depressed mood. The patient is nervous/anxious.          Physical Exam:   Blood pressure 122/80, pulse 87, height 170.2 cm (67.01\"), weight 77 kg (169 lb 12.8 oz), SpO2 97%, not currently breastfeeding.    Appearance: CF of stated age, NAD   Gait, Station, Strength: WNL    Mental Status Exam:     Mental Status exam performed 10/07/2024  and patient shows no significant changes from previous exam.     Hygiene:   good  Cooperation:  Cooperative  Eye Contact:  Good  Psychomotor Behavior:  Appropriate  Affect:  Full range  Mood: anxious, fluctates, and dysphoric  - managing well, situational   Hopelessness: Optimistic  Speech:  Normal  Thought Process:  Goal directed and Linear  Thought Content:  Normal and Mood congruent  Suicidal:  None, resolved  Homicidal:  None  Hallucinations:  None  Delusion:  None  Memory:  Intact  Orientation:  Person, Place, Time and Situation  Reliability:  good  Insight:  Fair  Judgement:  Good  Impulse Control:  Good      Lab Results:   No visits with results within 1 Month(s) from this visit.   Latest known visit with results is:   Office Visit on 07/25/2024   Component Date Value Ref Range Status    External Amphetamine Screen Urine 07/25/2024 Positive (A)   Final    External Benzodiazepine Screen [place]* 07/25/2024 Positive (A)   Final    External Cocaine Screen Urine 07/25/2024 Negative   Final    External THC Screen Urine 07/25/2024 Positive (A)   Final    External Methadone Screen Urine 07/25/2024 Negative   Final    External Methamphetamine Screen Ur* 07/25/2024 Negative   Final    External Oxycodone Screen Urine 07/25/2024 Negative   Final    External Buprenorphine Screen Urine 07/25/2024 Positive (A)   Final    External MDMA 07/25/2024 Negative   Final    External Opiates Screen Urine 07/25/2024 Negative   Final       Assessment & Plan   Diagnoses and all orders for this visit:    1. AMARIS (generalized anxiety disorder) (Primary)  -     ARIPiprazole " (ABILIFY) 10 MG tablet; Take 1 tablet by mouth Every Night.  Dispense: 30 tablet; Refill: 2  -     clonazePAM (KlonoPIN) 0.5 MG tablet; Take 1 tablet by mouth 2 (Two) Times a Day As Needed for Anxiety.  Dispense: 60 tablet; Refill: 2  -     venlafaxine XR (EFFEXOR-XR) 150 MG 24 hr capsule; Take 1 capsule by mouth Daily.  Dispense: 30 capsule; Refill: 2  -     propranolol (INDERAL) 20 MG tablet; TAKE 1/2 TO 1 (ONE-HALF TO ONE) TABLET BY MOUTH TWICE DAILY AS NEEDED FOR ANXIETY  Dispense: 60 tablet; Refill: 2    2. Medication management  -     [facility] Drug Screen    3. Chronic post-traumatic stress disorder (PTSD)  -     ARIPiprazole (ABILIFY) 10 MG tablet; Take 1 tablet by mouth Every Night.  Dispense: 30 tablet; Refill: 2  -     clonazePAM (KlonoPIN) 0.5 MG tablet; Take 1 tablet by mouth 2 (Two) Times a Day As Needed for Anxiety.  Dispense: 60 tablet; Refill: 2  -     venlafaxine XR (EFFEXOR-XR) 150 MG 24 hr capsule; Take 1 capsule by mouth Daily.  Dispense: 30 capsule; Refill: 2    4. Panic attacks  -     clonazePAM (KlonoPIN) 0.5 MG tablet; Take 1 tablet by mouth 2 (Two) Times a Day As Needed for Anxiety.  Dispense: 60 tablet; Refill: 2    5. Social anxiety disorder  -     venlafaxine XR (EFFEXOR-XR) 150 MG 24 hr capsule; Take 1 capsule by mouth Daily.  Dispense: 30 capsule; Refill: 2  -     propranolol (INDERAL) 20 MG tablet; TAKE 1/2 TO 1 (ONE-HALF TO ONE) TABLET BY MOUTH TWICE DAILY AS NEEDED FOR ANXIETY  Dispense: 60 tablet; Refill: 2        -Patient still dealing with a stressful situation but is managing well and continues to put herself first and have an exit strategy for after.  -Reviewed previous available documentation  -Reviewed most recent available labs   -NALINI reviewed and appropriate. Patient counseled on use of controlled substances.   -Continue Abilify 10 mg p.o. nightly for mood stabilization and anxiety  -Continue Effexor  mg p.o. daily for mood and anxiety  -Continue propranolol 10 to 20  mg p.o. as needed 30 minutes to an hour prior to anxiety provoking event for situational and social anxiety  -Continue clonazepam 0.5 mg p.o. daily as needed for anxiety or panic, this may give her better baseline control of symptoms and has a longer half-life so the hope is to avoid panic spells throughout the day while she is working  -Encourage cessation of nicotine  -Encourage cessation of cannabis  -Encouraged continued cessation of methamphetamine  -Encouraged to continue with therapy      Visit Diagnoses:    ICD-10-CM ICD-9-CM   1. AMARIS (generalized anxiety disorder)  F41.1 300.02   2. Medication management  Z79.899 V58.69   3. Chronic post-traumatic stress disorder (PTSD)  F43.12 309.81   4. Panic attacks  F41.0 300.01   5. Social anxiety disorder  F40.10 300.23           TREATMENT PLAN/GOALS: Continue supportive psychotherapy efforts and medications as indicated. Treatment and medication options discussed during today's visit. Patient acknowledged and verbally consented to continue with current treatment plan and was educated on the importance of compliance with treatment and follow-up appointments.    MEDICATION ISSUES:    Discussed medication options and treatment plan of prescribed medication as well as the risks, benefits, and side effects including potential falls, possible impaired driving and metabolic adversities among others. Patient is agreeable to call the office with any worsening of symptoms or onset of side effects. Patient is agreeable to call 911 or go to the nearest ER should he/she begin having SI/HI.     MEDS ORDERED DURING VISIT:  New Medications Ordered This Visit   Medications    ARIPiprazole (ABILIFY) 10 MG tablet     Sig: Take 1 tablet by mouth Every Night.     Dispense:  30 tablet     Refill:  2    clonazePAM (KlonoPIN) 0.5 MG tablet     Sig: Take 1 tablet by mouth 2 (Two) Times a Day As Needed for Anxiety.     Dispense:  60 tablet     Refill:  2     (Increased dose today)     venlafaxine XR (EFFEXOR-XR) 150 MG 24 hr capsule     Sig: Take 1 capsule by mouth Daily.     Dispense:  30 capsule     Refill:  2    propranolol (INDERAL) 20 MG tablet     Sig: TAKE 1/2 TO 1 (ONE-HALF TO ONE) TABLET BY MOUTH TWICE DAILY AS NEEDED FOR ANXIETY     Dispense:  60 tablet     Refill:  2       Return in about 3 months (around 1/7/2025).             This document has been electronically signed by Giacomo Phipps MD  October 7, 2024 14:06 EDT    Dictated using Dragon Dictation.

## 2024-12-30 ENCOUNTER — OFFICE VISIT (OUTPATIENT)
Dept: PSYCHIATRY | Facility: CLINIC | Age: 49
End: 2024-12-30
Payer: COMMERCIAL

## 2024-12-30 VITALS
BODY MASS INDEX: 26.49 KG/M2 | SYSTOLIC BLOOD PRESSURE: 124 MMHG | WEIGHT: 168.8 LBS | HEIGHT: 67 IN | DIASTOLIC BLOOD PRESSURE: 70 MMHG | HEART RATE: 82 BPM | OXYGEN SATURATION: 86 %

## 2024-12-30 DIAGNOSIS — F40.10 SOCIAL ANXIETY DISORDER: ICD-10-CM

## 2024-12-30 DIAGNOSIS — F43.12 CHRONIC POST-TRAUMATIC STRESS DISORDER (PTSD): Primary | ICD-10-CM

## 2024-12-30 DIAGNOSIS — F17.200 NICOTINE USE DISORDER: ICD-10-CM

## 2024-12-30 DIAGNOSIS — F41.1 GAD (GENERALIZED ANXIETY DISORDER): ICD-10-CM

## 2024-12-30 DIAGNOSIS — F41.0 PANIC ATTACKS: ICD-10-CM

## 2024-12-30 RX ORDER — VENLAFAXINE HYDROCHLORIDE 75 MG/1
75 CAPSULE, EXTENDED RELEASE ORAL DAILY
Qty: 30 CAPSULE | Refills: 2 | Status: SHIPPED | OUTPATIENT
Start: 2024-12-30 | End: 2025-12-30

## 2024-12-30 RX ORDER — ARIPIPRAZOLE 10 MG/1
10 TABLET ORAL NIGHTLY
Qty: 30 TABLET | Refills: 2 | Status: SHIPPED | OUTPATIENT
Start: 2024-12-30

## 2024-12-30 RX ORDER — VENLAFAXINE HYDROCHLORIDE 150 MG/1
150 CAPSULE, EXTENDED RELEASE ORAL DAILY
Qty: 30 CAPSULE | Refills: 2 | Status: SHIPPED | OUTPATIENT
Start: 2024-12-30

## 2024-12-30 RX ORDER — LORAZEPAM 1 MG/1
1 TABLET ORAL DAILY PRN
Qty: 30 TABLET | Refills: 2 | Status: SHIPPED | OUTPATIENT
Start: 2024-12-30

## 2024-12-30 NOTE — PROGRESS NOTES
Subjective   Meghan Rivas is a 49 y.o. female who presents today for follow up    Chief Complaint: History of bipolar disorder, PTSD, substance abuse, depression, anxiety    History of Present Illness: Patient presented today for follow-up.  Since last visit, she reports that her  got arrested and lost another job and her mother-in-law went off on him on Al which led him to call the police to try and get her removed from the home.  She states that she has been maintaining and feels fairly stable.  She is not having any medication side effects and despite the stress of the situation, she denies any major depressive or anxious symptoms that are causing difficulty on a regular basis.  She denies SI/HI/AVH.    The following portions of the patient's history were reviewed and updated as appropriate: allergies, current medications, past family history, past medical history, past social history, past surgical history and problem list.      Past Medical History:  Past Medical History:   Diagnosis Date    Anxiety     Depression     Disease of thyroid gland     GERD (gastroesophageal reflux disease)     Hepatitis C     History of drug abuse     Hypertension     Seizures     LAST SEIZURE 2019       Social History:  Social History     Socioeconomic History    Marital status:    Tobacco Use    Smoking status: Every Day     Current packs/day: 0.25     Average packs/day: 0.3 packs/day for 3.5 years (0.9 ttl pk-yrs)     Types: Cigarettes    Smokeless tobacco: Never   Vaping Use    Vaping status: Former   Substance and Sexual Activity    Alcohol use: Not Currently     Comment: sober 4 years    Drug use: Not Currently     Types: IV, Heroin, Methamphetamines     Comment: clean 5 years    Sexual activity: Yes     Partners: Male       Family History:  Family History   Adopted: Yes   Problem Relation Age of Onset    No Known Problems Adoptive Father     Behavior problems Adoptive Mother        Past Surgical  History:  Past Surgical History:   Procedure Laterality Date    APPENDECTOMY N/A 3/8/2022    Procedure: APPENDECTOMY LAPAROSCOPIC;  Surgeon: Babar Perkins MD;  Location:  [facility];  Service: General;  Laterality: N/A;    COLONOSCOPY N/A 10/5/2022    Procedure: COLONOSCOPY;  Surgeon: Neris Bear MD;  Location:  [facility];  Service: Gastroenterology;  Laterality: N/A;    OVARIAN CYST SURGERY      x7 surgeries       Problem List:  Patient Active Problem List   Diagnosis    Chronic hepatitis C without hepatic coma    MVP (mitral valve prolapse)    Hx of drug abuse    Leg neuralgia, right    Anxiety and depression    Gastroesophageal reflux disease    Syncope    Acute appendicitis    Chronic idiopathic constipation       Allergy:   Allergies   Allergen Reactions    Contrast Dye (Echo Or Unknown Ct/Mr) Anaphylaxis     Tolerated IV contrast CT 3/7/2022        Current Medications:   Current Outpatient Medications   Medication Sig Dispense Refill    albuterol sulfate  (90 Base) MCG/ACT inhaler Inhale 2 puffs Every 6 (Six) Hours As Needed for Wheezing. 6.7 g 2    ARIPiprazole (ABILIFY) 10 MG tablet Take 1 tablet by mouth Every Night. 30 tablet 2    chlorhexidine (HIBICLENS) 4 % external liquid Apply  topically to the appropriate area as directed Daily As Needed for Wound Care. 500 mL 1    clonazePAM (KlonoPIN) 0.5 MG tablet Take 1 tablet by mouth 2 (Two) Times a Day As Needed for Anxiety. 60 tablet 2    levothyroxine (SYNTHROID, LEVOTHROID) 75 MCG tablet Take 1 tablet by mouth Daily. 90 tablet 0    naloxone (NARCAN) 4 MG/0.1ML nasal spray Call 911. Don't prime. [place] in 1 nostril for overdose. Repeat in 2-3 minutes in other nostril if no or minimal breathing/responsiveness. 2 each 0    naproxen (NAPROSYN) 500 MG tablet Take 1 tablet by mouth 2 (Two) Times a Day As Needed for Moderate Pain. 20 tablet 0    omeprazole (PrilOSEC) 40 MG capsule Take 1 capsule by mouth Daily. 30 capsule 2    ondansetron  "(ZOFRAN) 4 MG tablet Take 2 tablets by mouth Every 12 (Twelve) Hours.      ondansetron ODT (ZOFRAN-ODT) 4 MG disintegrating tablet Take 1 tablet by mouth Every 8 (Eight) Hours As Needed for Nausea or Vomiting.      pantoprazole (PROTONIX) 40 MG EC tablet Take 1 tablet by mouth Daily.      potassium citrate (UROCIT-K) 5 MEQ (540 MG) CR tablet Take 20 mEq by mouth 2 (Two) Times a Day With Meals.      predniSONE (DELTASONE) 20 MG tablet Take 2 tablets by mouth Daily. 10 tablet 0    propranolol (INDERAL) 20 MG tablet TAKE 1/2 TO 1 (ONE-HALF TO ONE) TABLET BY MOUTH TWICE DAILY AS NEEDED FOR ANXIETY 60 tablet 2    venlafaxine XR (EFFEXOR-XR) 150 MG 24 hr capsule Take 1 capsule by mouth Daily. 30 capsule 2     No current facility-administered medications for this visit.       Review of Symptoms:    Review of Systems   Constitutional:  Positive for activity change and fatigue.   HENT:  Negative for congestion and rhinorrhea.    Eyes:  Negative for blurred vision and visual disturbance.   Respiratory:  Positive for shortness of breath. Negative for cough.    Cardiovascular:  Positive for palpitations. Negative for chest pain.   Gastrointestinal:  Negative for nausea and vomiting.   Endocrine: Negative for cold intolerance and heat intolerance.   Genitourinary:  Negative for dysuria and frequency.   Musculoskeletal:  Negative for arthralgias and myalgias.   Skin:  Negative for rash and wound.   Allergic/Immunologic: Negative for environmental allergies and food allergies.   Neurological:  Positive for dizziness and weakness. Negative for confusion.   Hematological:  Negative for adenopathy. Does not bruise/bleed easily.   Psychiatric/Behavioral:  Positive for decreased concentration, dysphoric mood and stress. Negative for agitation, sleep disturbance, suicidal ideas and depressed mood. The patient is nervous/anxious.          Physical Exam:   Blood pressure 124/70, pulse 82, height 170.2 cm (67.01\"), weight 76.6 kg (168 lb " 12.8 oz), SpO2 (!) 86%, not currently breastfeeding.    Appearance: CF of stated age, NAD   Gait, Station, Strength: WNL    Mental Status Exam:     Mental Status exam performed 12/30/2024  and patient shows no significant changes from previous exam.     Hygiene:   good  Cooperation:  Cooperative  Eye Contact:  Good  Psychomotor Behavior:  Appropriate  Affect:  Full range  Mood: anxious, fluctates, and dysphoric  - managing well, situational   Hopelessness: Optimistic  Speech:  Normal  Thought Process:  Goal directed and Linear  Thought Content:  Normal and Mood congruent  Suicidal:  None, resolved  Homicidal:  None  Hallucinations:  None  Delusion:  None  Memory:  Intact  Orientation:  Person, Place, Time and Situation  Reliability:  good  Insight:  Fair  Judgement:  Good  Impulse Control:  Good      Lab Results:   No visits with results within 1 Month(s) from this visit.   Latest known visit with results is:   Office Visit on 10/07/2024   Component Date Value Ref Range Status    External Amphetamine Screen Urine 10/07/2024 Negative   Final    External Benzodiazepine Screen Uri* 10/07/2024 Negative   Final    External Cocaine Screen Urine 10/07/2024 Negative   Final    External THC Screen Urine 10/07/2024 Negative   Final    External Methadone Screen Urine 10/07/2024 Negative   Final    External Methamphetamine Screen Ur* 10/07/2024 Negative   Final    External Oxycodone Screen Urine 10/07/2024 Negative   Final    External Buprenorphine Screen Urine 10/07/2024 Negative   Final    External MDMA 10/07/2024 Negative   Final    External Opiates Screen Urine 10/07/2024 Negative   Final       Assessment & Plan   Diagnoses and all orders for this visit:    1. Chronic post-traumatic stress disorder (PTSD) (Primary)  -     ARIPiprazole (ABILIFY) 10 MG tablet; Take 1 tablet by mouth Every Night.  Dispense: 30 tablet; Refill: 2  -     venlafaxine XR (EFFEXOR-XR) 150 MG 24 hr capsule; Take 1 capsule by mouth Daily. Take along  with 75mg dose for a total of 225mg.  Dispense: 30 capsule; Refill: 2  -     venlafaxine XR (Effexor XR) 75 MG 24 hr capsule; Take 1 capsule by mouth Daily. Take along with 150mg for total of 225mg.  Dispense: 30 capsule; Refill: 2    2. AMARIS (generalized anxiety disorder)  -     LORazepam (Ativan) 1 MG tablet; Take 1 tablet by mouth Daily As Needed for Anxiety.  Dispense: 30 tablet; Refill: 2  -     ARIPiprazole (ABILIFY) 10 MG tablet; Take 1 tablet by mouth Every Night.  Dispense: 30 tablet; Refill: 2  -     venlafaxine XR (EFFEXOR-XR) 150 MG 24 hr capsule; Take 1 capsule by mouth Daily. Take along with 75mg dose for a total of 225mg.  Dispense: 30 capsule; Refill: 2  -     venlafaxine XR (Effexor XR) 75 MG 24 hr capsule; Take 1 capsule by mouth Daily. Take along with 150mg for total of 225mg.  Dispense: 30 capsule; Refill: 2    3. Social anxiety disorder  -     venlafaxine XR (EFFEXOR-XR) 150 MG 24 hr capsule; Take 1 capsule by mouth Daily. Take along with 75mg dose for a total of 225mg.  Dispense: 30 capsule; Refill: 2  -     venlafaxine XR (Effexor XR) 75 MG 24 hr capsule; Take 1 capsule by mouth Daily. Take along with 150mg for total of 225mg.  Dispense: 30 capsule; Refill: 2    4. Nicotine use disorder    5. Panic attacks        -Patient is still dealing with stress from her home situation but is working towards resolution and is managing the stress well.  She still has some dysphoria and anxiety given the circumstances.  -Reviewed previous available documentation  -Reviewed most recent available labs   -NALINI reviewed and appropriate. Patient counseled on use of controlled substances.   -Continue Abilify 10 mg p.o. nightly for mood stabilization and anxiety  -Increase Effexor  mg to 225 mg p.o. daily for mood and anxiety  -Continue propranolol 10 to 20 mg p.o. as needed 30 minutes to an hour prior to anxiety provoking event for situational and social anxiety  -Continue clonazepam 0.5 mg p.o. daily as  needed for anxiety or panic  -Encourage cessation of nicotine  -Encourage cessation of cannabis  -Encouraged continued cessation of methamphetamine  -Encouraged to continue with therapy      Visit Diagnoses:    ICD-10-CM ICD-9-CM   1. Chronic post-traumatic stress disorder (PTSD)  F43.12 309.81   2. AMARIS (generalized anxiety disorder)  F41.1 300.02   3. Social anxiety disorder  F40.10 300.23   4. Nicotine use disorder  F17.200 305.1   5. Panic attacks  F41.0 300.01             TREATMENT PLAN/GOALS: Continue supportive psychotherapy efforts and medications as indicated. Treatment and medication options discussed during today's visit. Patient acknowledged and verbally consented to continue with current treatment plan and was educated on the importance of compliance with treatment and follow-up appointments.    MEDICATION ISSUES:    Discussed medication options and treatment plan of prescribed medication as well as the risks, benefits, and side effects including potential falls, possible impaired driving and metabolic adversities among others. Patient is agreeable to call the office with any worsening of symptoms or onset of side effects. Patient is agreeable to call 911 or go to the nearest ER should he/she begin having SI/HI.     MEDS ORDERED DURING VISIT:  New Medications Ordered This Visit   Medications    LORazepam (Ativan) 1 MG tablet     Sig: Take 1 tablet by mouth Daily As Needed for Anxiety.     Dispense:  30 tablet     Refill:  2     Okay to fill, discontinue any further Klonopin refills.    ARIPiprazole (ABILIFY) 10 MG tablet     Sig: Take 1 tablet by mouth Every Night.     Dispense:  30 tablet     Refill:  2    venlafaxine XR (EFFEXOR-XR) 150 MG 24 hr capsule     Sig: Take 1 capsule by mouth Daily. Take along with 75mg dose for a total of 225mg.     Dispense:  30 capsule     Refill:  2    venlafaxine XR (Effexor XR) 75 MG 24 hr capsule     Sig: Take 1 capsule by mouth Daily. Take along with 150mg for total  of 225mg.     Dispense:  30 capsule     Refill:  2       Return in about 6 weeks (around 2/10/2025).             This document has been electronically signed by Giacomo Phipps MD  December 30, 2024 10:36 EST    Dictated using Dragon Dictation.

## 2025-02-06 ENCOUNTER — TRANSCRIBE ORDERS (OUTPATIENT)
Dept: ADMINISTRATIVE | Facility: HOSPITAL | Age: 50
End: 2025-02-06
Payer: COMMERCIAL

## 2025-02-06 ENCOUNTER — OFFICE VISIT (OUTPATIENT)
Dept: FAMILY MEDICINE CLINIC | Facility: CLINIC | Age: 50
End: 2025-02-06
Payer: COMMERCIAL

## 2025-02-06 VITALS
HEIGHT: 67 IN | SYSTOLIC BLOOD PRESSURE: 110 MMHG | HEART RATE: 112 BPM | BODY MASS INDEX: 24.8 KG/M2 | DIASTOLIC BLOOD PRESSURE: 70 MMHG | OXYGEN SATURATION: 97 % | WEIGHT: 158 LBS | TEMPERATURE: 95.7 F

## 2025-02-06 DIAGNOSIS — R30.0 DYSURIA: ICD-10-CM

## 2025-02-06 DIAGNOSIS — R53.83 OTHER FATIGUE: Primary | ICD-10-CM

## 2025-02-06 DIAGNOSIS — E06.9 THYROIDITIS: ICD-10-CM

## 2025-02-06 DIAGNOSIS — Z12.31 ENCOUNTER FOR SCREENING MAMMOGRAM FOR MALIGNANT NEOPLASM OF BREAST: ICD-10-CM

## 2025-02-06 DIAGNOSIS — Z12.31 VISIT FOR SCREENING MAMMOGRAM: Primary | ICD-10-CM

## 2025-02-06 LAB
BILIRUB BLD-MCNC: ABNORMAL MG/DL
CLARITY, POC: ABNORMAL
COLOR UR: ABNORMAL
EXPIRATION DATE: ABNORMAL
EXPIRATION DATE: NORMAL
FLUAV AG UPPER RESP QL IA.RAPID: NOT DETECTED
FLUBV AG UPPER RESP QL IA.RAPID: NOT DETECTED
GLUCOSE UR STRIP-MCNC: NEGATIVE MG/DL
INTERNAL CONTROL: NORMAL
KETONES UR QL: NEGATIVE
LEUKOCYTE EST, POC: NEGATIVE
Lab: ABNORMAL
Lab: NORMAL
NITRITE UR-MCNC: POSITIVE MG/ML
PH UR: 6 [PH] (ref 5–8)
PROT UR STRIP-MCNC: ABNORMAL MG/DL
RBC # UR STRIP: ABNORMAL /UL
SARS-COV-2 AG UPPER RESP QL IA.RAPID: NOT DETECTED
SP GR UR: 1.03 (ref 1–1.03)
UROBILINOGEN UR QL: NORMAL

## 2025-02-06 PROCEDURE — 1126F AMNT PAIN NOTED NONE PRSNT: CPT | Performed by: FAMILY MEDICINE

## 2025-02-06 PROCEDURE — 99214 OFFICE O/P EST MOD 30 MIN: CPT | Performed by: FAMILY MEDICINE

## 2025-02-06 PROCEDURE — 87428 SARSCOV & INF VIR A&B AG IA: CPT | Performed by: FAMILY MEDICINE

## 2025-02-06 RX ORDER — LEVOTHYROXINE SODIUM 75 UG/1
75 TABLET ORAL DAILY
Qty: 90 TABLET | Refills: 0 | Status: SHIPPED | OUTPATIENT
Start: 2025-02-06

## 2025-02-06 RX ORDER — FERROUS SULFATE 325(65) MG
325 TABLET ORAL
COMMUNITY

## 2025-02-06 NOTE — PROGRESS NOTES
"Chief Complaint  Hypothyroidism    Subjective          Meghan Rivas presents to [facility]  Hypothyroidism      History of Present Illness  The patient is a 49-year-old female who presents for evaluation of hypothyroidism.    She reports experiencing generalized weakness for the past 4 days, accompanied by intermittent dyspnea. She also describes episodes of dizziness and imbalance when transitioning from sitting to standing or vice versa. These symptoms have been persistent for 4 days, causing her to miss work today, which is unusual for her. She was previously informed by her physician that her thyroid levels were abnormal and her iron levels were significantly low. These lab results were obtained last week at [facility] in [place]. She has been on Synthroid for a few months but has not been adhering to her medication regimen or attending follow-up appointments due to increased work hours. Consequently, she has been off her thyroid medication for approximately 2 months.    She also reports mild lower back pain, which she initially attributed to a potential urinary tract infection (UTI), a condition she frequently experiences.    MEDICATIONS  Synthroid    Review of Systems      Objective   Vital Signs:   /70 (BP Location: Right arm, Patient Position: Sitting, Cuff Size: Adult)   Pulse 112   Temp 95.7 °F (35.4 °C) (Temporal)   Ht 170.2 cm (67.01\")   Wt 71.7 kg (158 lb)   SpO2 97%   BMI 24.74 kg/m²     Physical Exam      Physical Exam  Constitutional:       General: She is not in acute distress.     Appearance: Normal appearance. She is well-developed and well-groomed. She is not ill-appearing, toxic-appearing or diaphoretic.   HENT:      Head: Normocephalic.      Right Ear: Tympanic membrane, ear canal and external ear normal.      Left Ear: Tympanic membrane, ear canal and external ear normal.      Nose: Nose normal. No congestion or rhinorrhea.      Mouth/Throat: "      Mouth: Mucous membranes are moist.      Pharynx: Oropharynx is clear. No oropharyngeal exudate or posterior oropharyngeal erythema.   Eyes:      General: Lids are normal.         Right eye: No discharge.         Left eye: No discharge.      Extraocular Movements: Extraocular movements intact.      Pupils: Pupils are equal, round, and reactive to light.   Neck:      Vascular: No carotid bruit.   Cardiovascular:      Rate and Rhythm: Normal rate and regular rhythm.      Pulses: Normal pulses.      Heart sounds: Normal heart sounds. No murmur heard.     No friction rub. No gallop.   Pulmonary:      Effort: Pulmonary effort is normal. No respiratory distress.      Breath sounds: Normal breath sounds. No stridor. No wheezing, rhonchi or rales.   Chest:      Chest wall: No tenderness.   Abdominal:      General: Bowel sounds are normal. There is no distension.      Palpations: Abdomen is soft. There is no mass.      Tenderness: There is no abdominal tenderness. There is no right CVA tenderness, left CVA tenderness, guarding or rebound.      Hernia: No hernia is present.   Musculoskeletal:         General: No swelling or tenderness. Normal range of motion.      Cervical back: Normal range of motion and neck supple. No rigidity or tenderness.      Right lower leg: No edema.      Left lower leg: No edema.   Lymphadenopathy:      Cervical: No cervical adenopathy.   Skin:     General: Skin is warm.      Capillary Refill: Capillary refill takes less than 2 seconds.      Coloration: Skin is not jaundiced.      Findings: No bruising, erythema or rash.   Neurological:      General: No focal deficit present.      Mental Status: She is alert and oriented to person, place, and time.      Motor: Motor function is intact. No weakness.      Coordination: Coordination is intact.      Gait: Gait is intact. Gait normal.   Psychiatric:         Attention and Perception: Attention normal.         Mood and Affect: Mood normal.          Speech: Speech normal.         Behavior: Behavior normal.         Cognition and Memory: Cognition normal.         Judgment: Judgment normal.        Result Review :          Results  Laboratory Studies  Thyroid levels were low. Iron levels were very low. Urine test showed presence of blood but no leukocytes.              Assessment and Plan    Diagnoses and all orders for this visit:    1. Other fatigue (Primary)  -     POCT SARS-CoV-2 Antigen BIANCA + Flu    2. Dysuria  -     POCT urinalysis dipstick, automated    3. Thyroiditis  -     levothyroxine (SYNTHROID, LEVOTHROID) 75 MCG tablet; Take 1 tablet by mouth Daily.  Dispense: 90 tablet; Refill: 0    4. Encounter for screening mammogram for malignant neoplasm of breast  -     Mammo Screening Bilateral With CAD; Future      Assessment & Plan  1. Hypothyroidism.  She has been off her thyroid medication for about 2 months. A prescription for Synthroid will be sent to [facility] in [place]. She is advised to resume her thyroid medication as prescribed.    2. Hematuria.  Her urine sample shows the presence of blood, which has been a consistent finding in previous tests. No leukocytes were detected. A urine culture will be ordered to further investigate the cause of hematuria.    Follow-up  The patient will follow up in 1 week.    Patient's Body mass index is 24.74 kg/m². indicating that she is within normal range (BMI 18.5-24.9). No BMI management plan needed..    Follow Up   Return in about 1 week (around 2/13/2025), or if symptoms worsen or fail to improve.  Patient was given instructions and counseling regarding her condition or for health maintenance advice. Please see specific information pulled into the AVS if appropriate.     This document has been electronically signed by REBECCA Lopes  February 6, 2025 16:53 EST     Patient or patient representative verbalized consent for the use of Ambient Listening during the visit with  REBECCA Lopes for chart  documentation. 2/6/2025  16:54 EST

## 2025-02-10 ENCOUNTER — HOSPITAL ENCOUNTER (OUTPATIENT)
Dept: MAMMOGRAPHY | Facility: HOSPITAL | Age: 50
Discharge: HOME | End: 2025-02-10
Admitting: FAMILY MEDICINE
Payer: COMMERCIAL

## 2025-02-10 DIAGNOSIS — Z12.31 VISIT FOR SCREENING MAMMOGRAM: ICD-10-CM

## 2025-02-10 PROCEDURE — 77063 BREAST TOMOSYNTHESIS BI: CPT

## 2025-02-10 PROCEDURE — 77067 SCR MAMMO BI INCL CAD: CPT | Performed by: RADIOLOGY

## 2025-02-10 PROCEDURE — 77063 BREAST TOMOSYNTHESIS BI: CPT | Performed by: RADIOLOGY

## 2025-02-10 PROCEDURE — 77067 SCR MAMMO BI INCL CAD: CPT

## 2025-02-11 ENCOUNTER — TELEPHONE (OUTPATIENT)
Dept: FAMILY MEDICINE CLINIC | Facility: CLINIC | Age: 50
End: 2025-02-11

## 2025-02-11 NOTE — TELEPHONE ENCOUNTER
Caller: Meghan Rivas    Relationship: Self    Best call back number: [phone]     Caller requesting test results: PATIENT    What test was performed: MAMMOGRAM    When was the test performed: 02/10/25    Where was the test performed: [facility]     Additional notes: PLEASE CALL WITH THE RESULTS

## 2025-02-12 ENCOUNTER — TELEPHONE (OUTPATIENT)
Dept: FAMILY MEDICINE CLINIC | Facility: CLINIC | Age: 50
End: 2025-02-12
Payer: COMMERCIAL

## 2025-02-12 NOTE — TELEPHONE ENCOUNTER
----- Message from Selena Gonzalez sent at 2/12/2025 10:44 AM EST -----  Please call the patient regarding her abnormal result. It recommended additional imaging on the right breast. They will call to schedule this.

## 2025-02-17 ENCOUNTER — OFFICE VISIT (OUTPATIENT)
Dept: FAMILY MEDICINE CLINIC | Facility: CLINIC | Age: 50
End: 2025-02-17
Payer: COMMERCIAL

## 2025-02-17 VITALS
WEIGHT: 156.2 LBS | BODY MASS INDEX: 24.52 KG/M2 | HEART RATE: 87 BPM | OXYGEN SATURATION: 99 % | HEIGHT: 67 IN | DIASTOLIC BLOOD PRESSURE: 68 MMHG | TEMPERATURE: 97.2 F | SYSTOLIC BLOOD PRESSURE: 108 MMHG

## 2025-02-17 DIAGNOSIS — J06.9 ACUTE URI: Primary | ICD-10-CM

## 2025-02-17 DIAGNOSIS — J22 LOWER RESPIRATORY INFECTION (E.G., BRONCHITIS, PNEUMONIA, PNEUMONITIS, PULMONITIS): ICD-10-CM

## 2025-02-17 PROCEDURE — 1126F AMNT PAIN NOTED NONE PRSNT: CPT | Performed by: FAMILY MEDICINE

## 2025-02-17 PROCEDURE — 96372 THER/PROPH/DIAG INJ SC/IM: CPT | Performed by: FAMILY MEDICINE

## 2025-02-17 PROCEDURE — 99213 OFFICE O/P EST LOW 20 MIN: CPT | Performed by: FAMILY MEDICINE

## 2025-02-17 RX ORDER — CEFTRIAXONE 1 G/1
1 INJECTION, POWDER, FOR SOLUTION INTRAMUSCULAR; INTRAVENOUS ONCE
Status: COMPLETED | OUTPATIENT
Start: 2025-02-17 | End: 2025-02-17

## 2025-02-17 RX ORDER — DEXAMETHASONE SODIUM PHOSPHATE 4 MG/ML
4 INJECTION, SOLUTION INTRA-ARTICULAR; INTRALESIONAL; INTRAMUSCULAR; INTRAVENOUS; SOFT TISSUE ONCE
Status: COMPLETED | OUTPATIENT
Start: 2025-02-17 | End: 2025-02-17

## 2025-02-17 RX ADMIN — CEFTRIAXONE 1 G: 1 INJECTION, POWDER, FOR SOLUTION INTRAMUSCULAR; INTRAVENOUS at 14:57

## 2025-02-17 RX ADMIN — DEXAMETHASONE SODIUM PHOSPHATE 4 MG: 4 INJECTION, SOLUTION INTRA-ARTICULAR; INTRALESIONAL; INTRAMUSCULAR; INTRAVENOUS; SOFT TISSUE at 14:58

## 2025-02-18 NOTE — PROGRESS NOTES
"Chief Complaint  Earache (Right sided ear pain )    Subjective          Meghan Rivas presents to [facility]  Earache       History of Present Illness  The patient is a 49-year-old female who presents for evaluation of congestion.    She reports a significant deterioration in her condition, characterized by severe ear pain that has been persistent for approximately one week. The intensity of the pain has escalated over the past 3 to 4 days. She also experiences dizziness, which she attributes to her ear discomfort. She has not received any recent steroid injections.    ALLERGIES  The patient has no known allergies.    Review of Systems   HENT:  Positive for ear pain.          Objective   Vital Signs:   /68 (BP Location: Right arm, Patient Position: Sitting)   Pulse 87   Temp 97.2 °F (36.2 °C) (Temporal)   Ht 170.2 cm (67.01\")   Wt 70.9 kg (156 lb 3.2 oz)   SpO2 99%   BMI 24.46 kg/m²     Physical Exam  Throat was examined.  Wheezing noted in the lungs.    Physical Exam  Constitutional:       General: She is not in acute distress.     Appearance: Normal appearance. She is well-developed and well-groomed. She is not ill-appearing, toxic-appearing or diaphoretic.   HENT:      Head: Normocephalic.      Right Ear: Tympanic membrane, ear canal and external ear normal.      Left Ear: Tympanic membrane, ear canal and external ear normal.      Nose: Nose normal. Congestion and rhinorrhea present.      Mouth/Throat:      Mouth: Mucous membranes are moist.      Pharynx: Oropharynx is clear. Posterior oropharyngeal erythema present. No oropharyngeal exudate.   Eyes:      General: Lids are normal.         Right eye: No discharge.         Left eye: No discharge.      Extraocular Movements: Extraocular movements intact.      Pupils: Pupils are equal, round, and reactive to light.   Neck:      Vascular: No carotid bruit.   Cardiovascular:      Rate and Rhythm: Normal rate and regular " rhythm.      Pulses: Normal pulses.      Heart sounds: Normal heart sounds. No murmur heard.     No friction rub. No gallop.   Pulmonary:      Effort: Pulmonary effort is normal. No respiratory distress.      Breath sounds: No stridor. Wheezing present. No rhonchi or rales.   Chest:      Chest wall: No tenderness.   Abdominal:      General: Bowel sounds are normal. There is no distension.      Palpations: Abdomen is soft. There is no mass.      Tenderness: There is no abdominal tenderness. There is no right CVA tenderness, left CVA tenderness, guarding or rebound.      Hernia: No hernia is present.   Musculoskeletal:         General: No swelling or tenderness. Normal range of motion.      Cervical back: Normal range of motion and neck supple. No rigidity or tenderness.      Right lower leg: No edema.      Left lower leg: No edema.   Lymphadenopathy:      Cervical: No cervical adenopathy.   Skin:     General: Skin is warm.      Capillary Refill: Capillary refill takes less than 2 seconds.      Coloration: Skin is not jaundiced.      Findings: No bruising, erythema or rash.   Neurological:      General: No focal deficit present.      Mental Status: She is alert and oriented to person, place, and time.      Motor: Motor function is intact. No weakness.      Coordination: Coordination is intact.      Gait: Gait is intact. Gait normal.   Psychiatric:         Attention and Perception: Attention normal.         Mood and Affect: Mood normal.         Speech: Speech normal.         Behavior: Behavior normal.         Cognition and Memory: Cognition normal.         Judgment: Judgment normal.        Result Review :          Results                Assessment and Plan    Diagnoses and all orders for this visit:    1. Acute URI (Primary)  -     cefTRIAXone (ROCEPHIN) injection 1 g  -     dexAMETHasone (DECADRON) injection 4 mg    2. Lower respiratory infection (e.g., bronchitis, pneumonia, pneumonitis, pulmonitis)  -      amoxicillin-clavulanate (AUGMENTIN) 875-125 MG per tablet; Take 1 tablet by mouth 2 (Two) Times a Day.  Dispense: 20 tablet; Refill: 0      Assessment & Plan  1. Congestion.  She reports worsening congestion and severe ear pain for the past week, with significant discomfort in the last 3-4 days. Examination reveals a severe condition in one ear and increased wheezing. A regimen of Augmentin 875 mg twice daily for 10 days will be initiated starting tomorrow. Additionally, Rocephin 1 g will be administered today. The prescription for Augmentin will be sent to [facility] in [place].    PROCEDURE  Rocephin 1 g will be administered today.    Patient's Body mass index is 24.46 kg/m². indicating that she is within normal range (BMI 18.5-24.9). No BMI management plan needed..    Follow Up   No follow-ups on file.  Patient was given instructions and counseling regarding her condition or for health maintenance advice. Please see specific information pulled into the AVS if appropriate.     This document has been electronically signed by REBECCA Lopes  February 18, 2025 14:55 EST     Patient or patient representative verbalized consent for the use of Ambient Listening during the visit with  REBECCA Lopes for chart documentation. 2/18/2025  14:54 EST

## 2025-02-24 ENCOUNTER — OFFICE VISIT (OUTPATIENT)
Dept: FAMILY MEDICINE CLINIC | Facility: CLINIC | Age: 50
End: 2025-02-24
Payer: COMMERCIAL

## 2025-02-24 VITALS
WEIGHT: 155.4 LBS | TEMPERATURE: 97.8 F | BODY MASS INDEX: 24.39 KG/M2 | HEART RATE: 84 BPM | SYSTOLIC BLOOD PRESSURE: 120 MMHG | HEIGHT: 67 IN | DIASTOLIC BLOOD PRESSURE: 78 MMHG | OXYGEN SATURATION: 95 %

## 2025-02-24 DIAGNOSIS — J22 LOWER RESPIRATORY INFECTION (E.G., BRONCHITIS, PNEUMONIA, PNEUMONITIS, PULMONITIS): Primary | ICD-10-CM

## 2025-02-24 PROCEDURE — 1125F AMNT PAIN NOTED PAIN PRSNT: CPT | Performed by: FAMILY MEDICINE

## 2025-02-24 PROCEDURE — 1159F MED LIST DOCD IN RCRD: CPT | Performed by: FAMILY MEDICINE

## 2025-02-24 PROCEDURE — 99213 OFFICE O/P EST LOW 20 MIN: CPT | Performed by: FAMILY MEDICINE

## 2025-02-24 PROCEDURE — 1160F RVW MEDS BY RX/DR IN RCRD: CPT | Performed by: FAMILY MEDICINE

## 2025-02-24 RX ORDER — METHYLPREDNISOLONE 4 MG/1
TABLET ORAL
Qty: 21 TABLET | Refills: 0 | Status: SHIPPED | OUTPATIENT
Start: 2025-02-24

## 2025-02-24 RX ORDER — DOXYCYCLINE 100 MG/1
100 CAPSULE ORAL 2 TIMES DAILY
Qty: 14 CAPSULE | Refills: 0 | Status: SHIPPED | OUTPATIENT
Start: 2025-02-24

## 2025-02-24 RX ORDER — SACCHAROMYCES BOULARDII 250 MG
250 CAPSULE ORAL 2 TIMES DAILY
Qty: 60 CAPSULE | Refills: 2 | Status: SHIPPED | OUTPATIENT
Start: 2025-02-24

## 2025-02-27 NOTE — PROGRESS NOTES
"Chief Complaint  Earache    Subjective          Meghan Rivas presents to [facility]  Earache       History of Present Illness  The patient is a 49-year-old female who presents for follow-up on ear pain.    She reports persistent severe ear pain, which has been ongoing for the past 10 days. She has not been able to hear out of it for about 10 days. She has not been utilizing probiotics as part of her treatment regimen. She has been receiving treatment with Augmentin and Rocephin injections. She has not yet completed her Augmentin course.    MEDICATIONS  Current: Augmentin, Rocephin    Review of Systems   HENT:  Positive for ear pain.          Objective   Vital Signs:   /78 (BP Location: Right arm, Patient Position: Sitting, Cuff Size: Adult)   Pulse 84   Temp 97.8 °F (36.6 °C) (Temporal)   Ht 170.2 cm (67.01\")   Wt 70.5 kg (155 lb 6.4 oz)   SpO2 95%   BMI 24.33 kg/m²     Physical Exam  There is fluid in the ears.    Physical Exam  Constitutional:       General: She is not in acute distress.     Appearance: Normal appearance. She is well-developed and well-groomed. She is not ill-appearing, toxic-appearing or diaphoretic.   HENT:      Head: Normocephalic.      Nose: Congestion and rhinorrhea present.      Mouth/Throat:      Mouth: Mucous membranes are moist.      Pharynx: Oropharynx is clear. Posterior oropharyngeal erythema present. No oropharyngeal exudate.   Eyes:      General: Lids are normal.         Right eye: No discharge.         Left eye: No discharge.      Extraocular Movements: Extraocular movements intact.      Pupils: Pupils are equal, round, and reactive to light.   Neck:      Vascular: No carotid bruit.   Cardiovascular:      Rate and Rhythm: Normal rate and regular rhythm.      Pulses: Normal pulses.      Heart sounds: Normal heart sounds. No murmur heard.     No friction rub. No gallop.   Pulmonary:      Effort: Pulmonary effort is normal. No respiratory " distress.      Breath sounds: Normal breath sounds. No stridor. No wheezing, rhonchi or rales.   Chest:      Chest wall: No tenderness.   Abdominal:      General: Bowel sounds are normal. There is no distension.      Palpations: Abdomen is soft. There is no mass.      Tenderness: There is no abdominal tenderness. There is no right CVA tenderness, left CVA tenderness, guarding or rebound.      Hernia: No hernia is present.   Musculoskeletal:         General: No swelling or tenderness. Normal range of motion.      Cervical back: Normal range of motion and neck supple. No rigidity or tenderness.      Right lower leg: No edema.      Left lower leg: No edema.   Lymphadenopathy:      Cervical: No cervical adenopathy.   Skin:     General: Skin is warm.      Capillary Refill: Capillary refill takes less than 2 seconds.      Coloration: Skin is not jaundiced.      Findings: No bruising, erythema or rash.   Neurological:      General: No focal deficit present.      Mental Status: She is alert and oriented to person, place, and time.      Motor: Motor function is intact. No weakness.      Coordination: Coordination is intact.      Gait: Gait is intact. Gait normal.   Psychiatric:         Attention and Perception: Attention normal.         Mood and Affect: Mood normal.         Speech: Speech normal.         Behavior: Behavior normal.         Cognition and Memory: Cognition normal.         Judgment: Judgment normal.        Result Review :          Results                Assessment and Plan    Diagnoses and all orders for this visit:    1. Lower respiratory infection (e.g., bronchitis, pneumonia, pneumonitis, pulmonitis) (Primary)  -     methylPREDNISolone (MEDROL) 4 MG dose pack; Take as directed on package instructions.  Dispense: 21 tablet; Refill: 0  -     doxycycline (VIBRAMYCIN) 100 MG capsule; Take 1 capsule by mouth 2 (Two) Times a Day.  Dispense: 14 capsule; Refill: 0    Other orders  -     saccharomyces boulardii  (Florastor) 250 MG capsule; Take 1 capsule by mouth 2 (Two) Times a Day.  Dispense: 60 capsule; Refill: 2      Assessment & Plan  1. Otalgia.  The patient's condition has not shown significant improvement despite the administration of Rocephin injection and Augmentin. She reports ear pain and hearing loss for about 10 days. Examination revealed fluid in the ear but less redness. A prescription for doxycycline 100 mg, to be taken twice daily for 7 days, has been issued. Additionally, a steroid pack will be provided. A probiotic will also be prescribed to mitigate the potential side effects of consecutive antibiotic use. All prescriptions will be sent to [facility] in [place].    Patient's Body mass index is 24.33 kg/m². indicating that she is within normal range (BMI 18.5-24.9). No BMI management plan needed..    Follow Up   Return if symptoms worsen or fail to improve.  Patient was given instructions and counseling regarding her condition or for health maintenance advice. Please see specific information pulled into the AVS if appropriate.     This document has been electronically signed by REBECCA Lopes  February 27, 2025 12:36 EST     Patient or patient representative verbalized consent for the use of Ambient Listening during the visit with  REBECCA Lopes for chart documentation. 2/27/2025  12:35 EST

## 2025-03-28 DIAGNOSIS — F40.10 SOCIAL ANXIETY DISORDER: ICD-10-CM

## 2025-03-28 DIAGNOSIS — F41.1 GAD (GENERALIZED ANXIETY DISORDER): ICD-10-CM

## 2025-03-28 DIAGNOSIS — F43.12 CHRONIC POST-TRAUMATIC STRESS DISORDER (PTSD): ICD-10-CM

## 2025-03-31 RX ORDER — VENLAFAXINE HYDROCHLORIDE 75 MG/1
CAPSULE, EXTENDED RELEASE ORAL
Qty: 90 CAPSULE | Refills: 0 | Status: SHIPPED | OUTPATIENT
Start: 2025-03-31

## 2025-04-07 DIAGNOSIS — F41.1 GAD (GENERALIZED ANXIETY DISORDER): ICD-10-CM

## 2025-04-07 RX ORDER — LORAZEPAM 1 MG/1
1 TABLET ORAL DAILY PRN
Qty: 30 TABLET | Refills: 2 | Status: SHIPPED | OUTPATIENT
Start: 2025-04-07

## 2025-04-21 DIAGNOSIS — F41.1 GAD (GENERALIZED ANXIETY DISORDER): ICD-10-CM

## 2025-04-21 DIAGNOSIS — F43.12 CHRONIC POST-TRAUMATIC STRESS DISORDER (PTSD): ICD-10-CM

## 2025-04-21 RX ORDER — ARIPIPRAZOLE 10 MG/1
10 TABLET ORAL NIGHTLY
Qty: 30 TABLET | Refills: 0 | Status: SHIPPED | OUTPATIENT
Start: 2025-04-21

## 2025-05-14 ENCOUNTER — HOSPITAL ENCOUNTER (OUTPATIENT)
Dept: MAMMOGRAPHY | Facility: HOSPITAL | Age: 50
Discharge: HOME | End: 2025-05-14
Admitting: RADIOLOGY
Payer: COMMERCIAL

## 2025-05-14 DIAGNOSIS — R92.8 ABNORMAL MAMMOGRAM: ICD-10-CM

## 2025-05-14 PROCEDURE — G0279 TOMOSYNTHESIS, MAMMO: HCPCS

## 2025-05-14 PROCEDURE — 77065 DX MAMMO INCL CAD UNI: CPT

## 2025-05-16 DIAGNOSIS — F43.12 CHRONIC POST-TRAUMATIC STRESS DISORDER (PTSD): ICD-10-CM

## 2025-05-16 DIAGNOSIS — F41.1 GAD (GENERALIZED ANXIETY DISORDER): ICD-10-CM

## 2025-05-16 RX ORDER — ARIPIPRAZOLE 10 MG/1
10 TABLET ORAL NIGHTLY
Qty: 30 TABLET | Refills: 0 | Status: SHIPPED | OUTPATIENT
Start: 2025-05-16

## 2025-05-20 DIAGNOSIS — E06.9 THYROIDITIS: ICD-10-CM

## 2025-05-20 RX ORDER — LEVOTHYROXINE SODIUM 75 UG/1
75 TABLET ORAL DAILY
Qty: 90 TABLET | Refills: 0 | Status: SHIPPED | OUTPATIENT
Start: 2025-05-20

## 2025-06-03 DIAGNOSIS — F43.12 CHRONIC POST-TRAUMATIC STRESS DISORDER (PTSD): ICD-10-CM

## 2025-06-03 DIAGNOSIS — F41.1 GAD (GENERALIZED ANXIETY DISORDER): ICD-10-CM

## 2025-06-03 RX ORDER — ARIPIPRAZOLE 10 MG/1
10 TABLET ORAL NIGHTLY
Qty: 30 TABLET | Refills: 0 | Status: SHIPPED | OUTPATIENT
Start: 2025-06-03

## 2025-06-25 DIAGNOSIS — F43.12 CHRONIC POST-TRAUMATIC STRESS DISORDER (PTSD): ICD-10-CM

## 2025-06-25 DIAGNOSIS — F40.10 SOCIAL ANXIETY DISORDER: ICD-10-CM

## 2025-06-25 DIAGNOSIS — F41.1 GAD (GENERALIZED ANXIETY DISORDER): ICD-10-CM

## 2025-06-25 RX ORDER — VENLAFAXINE HYDROCHLORIDE 75 MG/1
75 CAPSULE, EXTENDED RELEASE ORAL DAILY
Qty: 90 CAPSULE | Refills: 0 | Status: SHIPPED | OUTPATIENT
Start: 2025-06-25

## 2025-07-14 ENCOUNTER — OFFICE VISIT (OUTPATIENT)
Dept: PSYCHIATRY | Facility: CLINIC | Age: 50
End: 2025-07-14
Payer: COMMERCIAL

## 2025-07-14 VITALS
HEART RATE: 89 BPM | WEIGHT: 148 LBS | OXYGEN SATURATION: 98 % | BODY MASS INDEX: 23.23 KG/M2 | DIASTOLIC BLOOD PRESSURE: 76 MMHG | SYSTOLIC BLOOD PRESSURE: 122 MMHG | HEIGHT: 67 IN

## 2025-07-14 DIAGNOSIS — F41.1 GAD (GENERALIZED ANXIETY DISORDER): ICD-10-CM

## 2025-07-14 DIAGNOSIS — Z79.899 MEDICATION MANAGEMENT: ICD-10-CM

## 2025-07-14 DIAGNOSIS — F40.10 SOCIAL ANXIETY DISORDER: ICD-10-CM

## 2025-07-14 DIAGNOSIS — F43.12 CHRONIC POST-TRAUMATIC STRESS DISORDER (PTSD): Primary | ICD-10-CM

## 2025-07-14 DIAGNOSIS — F12.10 CANNABIS ABUSE, EPISODIC: ICD-10-CM

## 2025-07-14 DIAGNOSIS — F11.10 OPIOID ABUSE: ICD-10-CM

## 2025-07-14 DIAGNOSIS — F17.200 NICOTINE USE DISORDER: ICD-10-CM

## 2025-07-14 LAB
AMPHET+METHAMPHET UR QL: POSITIVE
AMPHETAMINES UR QL: NEGATIVE
BARBITURATES UR QL SCN: NEGATIVE
BENZODIAZ UR QL SCN: POSITIVE
BUPRENORPHINE SERPL-MCNC: POSITIVE NG/ML
CANNABINOIDS SERPL QL: POSITIVE
COCAINE UR QL: NEGATIVE
MDMA UR QL SCN: NEGATIVE
METHADONE UR QL SCN: NEGATIVE
MORPHINE/OPIATES SCREEN, URINE: NEGATIVE
OXYCODONE UR QL SCN: NEGATIVE
PCP UR QL SCN: NEGATIVE
PROPOXYPH UR QL SCN: NEGATIVE
TRICYCLICS UR QL SCN: NEGATIVE

## 2025-07-14 PROCEDURE — 80305 DRUG TEST PRSMV DIR OPT OBS: CPT | Performed by: PSYCHIATRY & NEUROLOGY

## 2025-07-14 PROCEDURE — 99214 OFFICE O/P EST MOD 30 MIN: CPT | Performed by: PSYCHIATRY & NEUROLOGY

## 2025-07-14 PROCEDURE — 1159F MED LIST DOCD IN RCRD: CPT | Performed by: PSYCHIATRY & NEUROLOGY

## 2025-07-14 PROCEDURE — 1160F RVW MEDS BY RX/DR IN RCRD: CPT | Performed by: PSYCHIATRY & NEUROLOGY

## 2025-07-14 RX ORDER — VENLAFAXINE HYDROCHLORIDE 150 MG/1
150 CAPSULE, EXTENDED RELEASE ORAL DAILY
Qty: 30 CAPSULE | Refills: 2 | Status: SHIPPED | OUTPATIENT
Start: 2025-07-14

## 2025-07-14 RX ORDER — ARIPIPRAZOLE 10 MG/1
10 TABLET ORAL NIGHTLY
Qty: 30 TABLET | Refills: 2 | Status: SHIPPED | OUTPATIENT
Start: 2025-07-14

## 2025-07-14 RX ORDER — LORAZEPAM 2 MG/1
2 TABLET ORAL DAILY PRN
Qty: 30 TABLET | Refills: 0 | Status: SHIPPED | OUTPATIENT
Start: 2025-07-14

## 2025-07-14 RX ORDER — VENLAFAXINE HYDROCHLORIDE 75 MG/1
75 CAPSULE, EXTENDED RELEASE ORAL DAILY
Qty: 90 CAPSULE | Refills: 0 | Status: SHIPPED | OUTPATIENT
Start: 2025-07-14
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
## 2025-07-16 LAB
6-ACETYLMORPHINE: NOT DETECTED NG/ML
9-DELTA-THC-COOH: >1250 NG/ML
ALCOHOL, ETHYL: NOT DETECTED MG/DL
AMPHETAMINE: NOT DETECTED NG/ML
BENZODIAZ BLD QL: DETECTED NG/ML
BUPRENORPHINE SAL CFM-MCNC: DETECTED NG/ML
BUPRENORPHINE: 516 NG/ML
COCAINE METABOLITE: NOT DETECTED NG/ML
CREATININE: 221.5 MG/DL
EDDP SERPL QL: NOT DETECTED NG/ML
FENTANYL-EIA: NOT DETECTED NG/ML
METHAMPHETAMINE: NOT DETECTED NG/ML
NORBUPRENORPHINE: 105 NG/ML
OPIATES: NOT DETECTED NG/ML
OXIDANTS: NOT DETECTED UG/ML
OXYCODONE: NOT DETECTED NG/ML
PCP: NOT DETECTED NG/ML
PH: 5.5 (ref 4.5–9)
REF LAB TEST METHOD: NORMAL
SPECIFIC GRAVITY, QUAN: 1.03 (ref 1–1.03)
THC: DETECTED NG/ML

## 2025-08-15 DIAGNOSIS — E06.9 THYROIDITIS: ICD-10-CM

## 2025-08-18 RX ORDER — LEVOTHYROXINE SODIUM 75 UG/1
75 TABLET ORAL DAILY
Qty: 90 TABLET | Refills: 0 | OUTPATIENT
Start: 2025-08-18

## 2025-08-25 ENCOUNTER — OFFICE VISIT (OUTPATIENT)
Dept: FAMILY MEDICINE CLINIC | Facility: CLINIC | Age: 50
End: 2025-08-25
Payer: COMMERCIAL

## 2025-08-25 ENCOUNTER — LAB (OUTPATIENT)
Dept: FAMILY MEDICINE CLINIC | Facility: CLINIC | Age: 50
End: 2025-08-25
Payer: COMMERCIAL

## 2025-08-25 VITALS
HEART RATE: 66 BPM | BODY MASS INDEX: 24.55 KG/M2 | OXYGEN SATURATION: 97 % | WEIGHT: 156.4 LBS | HEIGHT: 67 IN | TEMPERATURE: 97.8 F | DIASTOLIC BLOOD PRESSURE: 80 MMHG | SYSTOLIC BLOOD PRESSURE: 124 MMHG

## 2025-08-25 DIAGNOSIS — R30.0 DYSURIA: Primary | ICD-10-CM

## 2025-08-25 DIAGNOSIS — E06.9 THYROIDITIS: ICD-10-CM

## 2025-08-25 DIAGNOSIS — R30.0 DYSURIA: ICD-10-CM

## 2025-08-25 DIAGNOSIS — K21.9 GASTROESOPHAGEAL REFLUX DISEASE: ICD-10-CM

## 2025-08-25 DIAGNOSIS — K59.04 CHRONIC IDIOPATHIC CONSTIPATION: ICD-10-CM

## 2025-08-25 LAB
BILIRUB BLD-MCNC: NEGATIVE MG/DL
CLARITY, POC: ABNORMAL
COLOR UR: ABNORMAL
EXPIRATION DATE: ABNORMAL
GLUCOSE UR STRIP-MCNC: NEGATIVE MG/DL
KETONES UR QL: NEGATIVE
LEUKOCYTE EST, POC: NEGATIVE
Lab: ABNORMAL
NITRITE UR-MCNC: NEGATIVE MG/ML
PH UR: 6 [PH] (ref 5–8)
PROT UR STRIP-MCNC: NEGATIVE MG/DL
RBC # UR STRIP: ABNORMAL /UL
SP GR UR: 1.03 (ref 1–1.03)
UROBILINOGEN UR QL: NORMAL

## 2025-08-25 PROCEDURE — 87086 URINE CULTURE/COLONY COUNT: CPT | Performed by: FAMILY MEDICINE

## 2025-08-25 PROCEDURE — 99214 OFFICE O/P EST MOD 30 MIN: CPT | Performed by: FAMILY MEDICINE

## 2025-08-25 PROCEDURE — 1125F AMNT PAIN NOTED PAIN PRSNT: CPT | Performed by: FAMILY MEDICINE

## 2025-08-25 RX ORDER — LEVOTHYROXINE SODIUM 75 UG/1
75 TABLET ORAL DAILY
Qty: 90 TABLET | Refills: 0 | Status: SHIPPED | OUTPATIENT
Start: 2025-08-25

## 2025-08-25 RX ORDER — OMEPRAZOLE 40 MG/1
40 CAPSULE, DELAYED RELEASE ORAL DAILY
Qty: 30 CAPSULE | Refills: 2 | Status: SHIPPED | OUTPATIENT
Start: 2025-08-25

## 2025-08-25 RX ORDER — NITROFURANTOIN 25; 75 MG/1; MG/1
100 CAPSULE ORAL 2 TIMES DAILY
Qty: 6 CAPSULE | Refills: 0 | Status: SHIPPED | OUTPATIENT
Start: 2025-08-25 | End: 2025-08-28

## 2025-08-26 LAB — BACTERIA SPEC AEROBE CULT: NORMAL

## (undated) DEVICE — Device

## (undated) DEVICE — ADHS SKIN PREMIERPRO EXOFIN TOPICAL HI/VISC .5ML

## (undated) DEVICE — APPL HEMO SURG ARISTA/AH/FLEXITIP XL 38CM

## (undated) DEVICE — GLV SURG PREMIERPRO MIC LTX PF SZ8 BRN

## (undated) DEVICE — ENDOPATH XCEL BLADELESS TROCARS WITH STABILITY SLEEVES: Brand: ENDOPATH XCEL

## (undated) DEVICE — HOLDER: Brand: DEROYAL

## (undated) DEVICE — THE ECHELON FLEX POWERED PLUS ARTICULATING ENDOSCOPIC LINEAR CUTTERS ARE STERILE, SINGLE PATIENT USE INSTRUMENTS THAT SIMULTANEOUSLYCUT AND STAPLE TISSUE. THERE ARE SIX STAGGERED ROWS OF STAPLES, THREE ON EITHER SIDE OF THE CUT LINE. THE ECHELON FLEX 45 POWERED PLUSINSTRUMENTS HAVE A STAPLE LINE THAT IS APPROXIMATELY 45 MM LONG AND A CUT LINE THAT IS APPROXIMATELY 42 MM LONG. THE SHAFT CAN ROTATE FREELYIN BOTH DIRECTIONS AND AN ARTICULATION MECHANISM ENABLES THE DISTAL PORTION OF THE SHAFT TO PIVOT TO FACILITATE LATERAL ACCESS TO THE OPERATIVESITE.THE INSTRUMENTS ARE PACKAGED WITH A PRIMARY LITHIUM BATTERY PACK THAT MUST BE INSTALLED PRIOR TO USE. THERE ARE SPECIFIC REQUIREMENTS FORDISPOSING OF THE BATTERY PACK. REFER TO THE BATTERY PACK DISPOSAL SECTION.THE INSTRUMENTS ARE PACKAGED WITHOUT A RELOAD AND MUST BE LOADED PRIOR TO USE. A STAPLE RETAINING CAP ON THE RELOAD PROTECTS THE STAPLE LEGPOINTS DURING SHIPPING AND TRANSPORTATION. THE INSTRUMENTS’ LOCK-OUT FEATURE IS DESIGNED TO PREVENT A USED OR IMPROPERLY INSTALLED RELOADFROM BEING REFIRED OR AN INSTRUMENT FROM BEING FIRED WITHOUT A RELOAD.: Brand: ECHELON FLEX

## (undated) DEVICE — TOTAL TRAY, 16FR 10ML SIL FOLEY, URN: Brand: MEDLINE

## (undated) DEVICE — CONN Y IRR DISP 1P/U

## (undated) DEVICE — Device: Brand: DEFENDO AIR/WATER/SUCTION AND BIOPSY VALVE

## (undated) DEVICE — PATIENT RETURN ELECTRODE, SINGLE-USE, CONTACT QUALITY MONITORING, ADULT, WITH 9FT CORD, FOR PATIENTS WEIGING OVER 33LBS. (15KG): Brand: MEGADYNE

## (undated) DEVICE — SUT MNCRYL PLS ANTIB UD 4/0 PS2 18IN

## (undated) DEVICE — DRP UTIL 2/LAYR W/TP 15X26IN STRL PK/4

## (undated) DEVICE — APPL CHLORAPREP HI/LITE 26ML ORNG

## (undated) DEVICE — UNDERGLV SURG BIOGEL INDICAT PF 8 GRN

## (undated) DEVICE — ST TBG PNEUMOCLEAR EVAC SMOKE HIFLO

## (undated) DEVICE — ENDOGATOR AUXILIARY WATER JET CONNECTOR: Brand: ENDOGATOR

## (undated) DEVICE — ENDOGATOR TUBING FOR ENDOGATOR EGP-100 IRRIGATION PUMP,OLYMPUS OFP PUMP, OLYMPUS AFU-100 PUMP AND ERBE EIP2 PUMP: Brand: ENDOGATOR

## (undated) DEVICE — ENSEAL 20 CM SHAFT, LARGE JAW: Brand: ENSEAL X1

## (undated) DEVICE — PK LAP GEN 70

## (undated) DEVICE — GLV SURG SENSICARE W/ALOE PF LF 7.5 STRL

## (undated) DEVICE — ENDOPATH XCEL UNIVERSAL TROCAR STABLILITY SLEEVES: Brand: ENDOPATH XCEL

## (undated) DEVICE — SUT VIC 0/0 UR6 27IN DYED J603H